# Patient Record
Sex: FEMALE | Race: WHITE | NOT HISPANIC OR LATINO | Employment: OTHER | URBAN - METROPOLITAN AREA
[De-identification: names, ages, dates, MRNs, and addresses within clinical notes are randomized per-mention and may not be internally consistent; named-entity substitution may affect disease eponyms.]

---

## 2017-04-07 ENCOUNTER — HOSPITAL ENCOUNTER (OUTPATIENT)
Facility: MEDICAL CENTER | Age: 32
End: 2017-04-07
Attending: OBSTETRICS & GYNECOLOGY | Admitting: OBSTETRICS & GYNECOLOGY
Payer: COMMERCIAL

## 2017-04-07 VITALS
RESPIRATION RATE: 16 BRPM | OXYGEN SATURATION: 98 % | BODY MASS INDEX: 25.66 KG/M2 | HEART RATE: 55 BPM | HEIGHT: 63 IN | WEIGHT: 144.84 LBS | SYSTOLIC BLOOD PRESSURE: 108 MMHG | TEMPERATURE: 97.8 F | DIASTOLIC BLOOD PRESSURE: 63 MMHG

## 2017-04-07 PROBLEM — O02.1 MISSED ABORTION: Status: ACTIVE | Noted: 2017-04-07

## 2017-04-07 LAB
ABO GROUP BLD: NORMAL
ABO GROUP BLD: NORMAL
BASOPHILS # BLD AUTO: 0.5 % (ref 0–1.8)
BASOPHILS # BLD: 0.03 K/UL (ref 0–0.12)
BLD GP AB SCN SERPL QL: NORMAL
EOSINOPHIL # BLD AUTO: 0.11 K/UL (ref 0–0.51)
EOSINOPHIL NFR BLD: 1.7 % (ref 0–6.9)
ERYTHROCYTE [DISTWIDTH] IN BLOOD BY AUTOMATED COUNT: 40.2 FL (ref 35.9–50)
HCT VFR BLD AUTO: 40.2 % (ref 37–47)
HGB BLD-MCNC: 13.5 G/DL (ref 12–16)
IMM GRANULOCYTES # BLD AUTO: 0.02 K/UL (ref 0–0.11)
IMM GRANULOCYTES NFR BLD AUTO: 0.3 % (ref 0–0.9)
LYMPHOCYTES # BLD AUTO: 1.67 K/UL (ref 1–4.8)
LYMPHOCYTES NFR BLD: 25.1 % (ref 22–41)
MCH RBC QN AUTO: 31.2 PG (ref 27–33)
MCHC RBC AUTO-ENTMCNC: 33.6 G/DL (ref 33.6–35)
MCV RBC AUTO: 92.8 FL (ref 81.4–97.8)
MONOCYTES # BLD AUTO: 0.45 K/UL (ref 0–0.85)
MONOCYTES NFR BLD AUTO: 6.8 % (ref 0–13.4)
NEUTROPHILS # BLD AUTO: 4.37 K/UL (ref 2–7.15)
NEUTROPHILS NFR BLD: 65.6 % (ref 44–72)
NRBC # BLD AUTO: 0 K/UL
NRBC BLD AUTO-RTO: 0 /100 WBC
PLATELET # BLD AUTO: 237 K/UL (ref 164–446)
PMV BLD AUTO: 9.1 FL (ref 9–12.9)
RBC # BLD AUTO: 4.33 M/UL (ref 4.2–5.4)
RH BLD: NORMAL
WBC # BLD AUTO: 6.7 K/UL (ref 4.8–10.8)

## 2017-04-07 PROCEDURE — 86900 BLOOD TYPING SEROLOGIC ABO: CPT

## 2017-04-07 PROCEDURE — 86901 BLOOD TYPING SEROLOGIC RH(D): CPT

## 2017-04-07 PROCEDURE — 160002 HCHG RECOVERY MINUTES (STAT): Performed by: OBSTETRICS & GYNECOLOGY

## 2017-04-07 PROCEDURE — 500342 HCHG CURRETTE TIP, VAC CURV: Performed by: OBSTETRICS & GYNECOLOGY

## 2017-04-07 PROCEDURE — 700111 HCHG RX REV CODE 636 W/ 250 OVERRIDE (IP)

## 2017-04-07 PROCEDURE — 110371 HCHG SHELL REV 272: Performed by: OBSTETRICS & GYNECOLOGY

## 2017-04-07 PROCEDURE — 110382 HCHG SHELL REV 271: Performed by: OBSTETRICS & GYNECOLOGY

## 2017-04-07 PROCEDURE — 160036 HCHG PACU - EA ADDL 30 MINS PHASE I: Performed by: OBSTETRICS & GYNECOLOGY

## 2017-04-07 PROCEDURE — 502240 HCHG MISC OR SUPPLY RC 0272: Performed by: OBSTETRICS & GYNECOLOGY

## 2017-04-07 PROCEDURE — 88305 TISSUE EXAM BY PATHOLOGIST: CPT

## 2017-04-07 PROCEDURE — 36415 COLL VENOUS BLD VENIPUNCTURE: CPT

## 2017-04-07 PROCEDURE — 85025 COMPLETE CBC W/AUTO DIFF WBC: CPT

## 2017-04-07 PROCEDURE — 160048 HCHG OR STATISTICAL LEVEL 1-5: Performed by: OBSTETRICS & GYNECOLOGY

## 2017-04-07 PROCEDURE — 502587 HCHG PACK, D&C: Performed by: OBSTETRICS & GYNECOLOGY

## 2017-04-07 PROCEDURE — 160009 HCHG ANES TIME/MIN: Performed by: OBSTETRICS & GYNECOLOGY

## 2017-04-07 PROCEDURE — A9270 NON-COVERED ITEM OR SERVICE: HCPCS

## 2017-04-07 PROCEDURE — 700101 HCHG RX REV CODE 250

## 2017-04-07 PROCEDURE — A4606 OXYGEN PROBE USED W OXIMETER: HCPCS | Performed by: OBSTETRICS & GYNECOLOGY

## 2017-04-07 PROCEDURE — 160028 HCHG SURGERY MINUTES - 1ST 30 MINS LEVEL 3: Performed by: OBSTETRICS & GYNECOLOGY

## 2017-04-07 PROCEDURE — 700102 HCHG RX REV CODE 250 W/ 637 OVERRIDE(OP)

## 2017-04-07 PROCEDURE — 86850 RBC ANTIBODY SCREEN: CPT

## 2017-04-07 PROCEDURE — 160035 HCHG PACU - 1ST 60 MINS PHASE I: Performed by: OBSTETRICS & GYNECOLOGY

## 2017-04-07 PROCEDURE — 500448 HCHG DRESSING, TELFA 3X4: Performed by: OBSTETRICS & GYNECOLOGY

## 2017-04-07 RX ORDER — SIMETHICONE 80 MG
80 TABLET,CHEWABLE ORAL EVERY 8 HOURS PRN
Status: DISCONTINUED | OUTPATIENT
Start: 2017-04-07 | End: 2017-04-07 | Stop reason: HOSPADM

## 2017-04-07 RX ORDER — ACETAMINOPHEN 325 MG/1
650 TABLET ORAL EVERY 6 HOURS
Status: DISCONTINUED | OUTPATIENT
Start: 2017-04-07 | End: 2017-04-07 | Stop reason: HOSPADM

## 2017-04-07 RX ORDER — OXYCODONE HYDROCHLORIDE 5 MG/1
10 TABLET ORAL
Status: DISCONTINUED | OUTPATIENT
Start: 2017-04-07 | End: 2017-04-07 | Stop reason: HOSPADM

## 2017-04-07 RX ORDER — OXYCODONE HYDROCHLORIDE 5 MG/1
5 TABLET ORAL
Status: DISCONTINUED | OUTPATIENT
Start: 2017-04-07 | End: 2017-04-07 | Stop reason: HOSPADM

## 2017-04-07 RX ORDER — ONDANSETRON 2 MG/ML
4 INJECTION INTRAMUSCULAR; INTRAVENOUS EVERY 6 HOURS PRN
Status: DISCONTINUED | OUTPATIENT
Start: 2017-04-07 | End: 2017-04-07 | Stop reason: HOSPADM

## 2017-04-07 RX ORDER — SODIUM CHLORIDE, SODIUM LACTATE, POTASSIUM CHLORIDE, CALCIUM CHLORIDE 600; 310; 30; 20 MG/100ML; MG/100ML; MG/100ML; MG/100ML
1000 INJECTION, SOLUTION INTRAVENOUS
Status: COMPLETED | OUTPATIENT
Start: 2017-04-07 | End: 2017-04-07

## 2017-04-07 RX ADMIN — SODIUM CHLORIDE, SODIUM LACTATE, POTASSIUM CHLORIDE, CALCIUM CHLORIDE 1000 ML: 600; 310; 30; 20 INJECTION, SOLUTION INTRAVENOUS at 12:05

## 2017-04-07 RX ADMIN — HYDROCODONE BITARTRATE AND ACETAMINOPHEN 15 ML: 2.5; 108 SOLUTION ORAL at 13:40

## 2017-04-07 ASSESSMENT — PAIN SCALES - GENERAL
PAINLEVEL_OUTOF10: 2
PAINLEVEL_OUTOF10: 2
PAINLEVEL_OUTOF10: 0
PAINLEVEL_OUTOF10: 1
PAINLEVEL_OUTOF10: 2

## 2017-04-07 NOTE — H&P
PLANNED DATE OF PROCEDURE:  2017    CHIEF COMPLAINT:  Missed .    HISTORY OF PRESENT ILLNESS:  This is a 32-year-old  female -0-2-0 with a missed  at 6 weeks by ultrasound.  She had an   ultrasound few days ago showing a 6-week intrauterine fetal pole without   cardiac activity.  She had blood work done with falling hCG quant.  A repeat   ultrasound on 2017 again found a small fetal pole without cardiac   activity.  The yolk sac was slightly enlarged, but regular.  The patient is   also having a small amount of bleeding.  She had no other complaints.  She is   Rh positive.    PAST MEDICAL HISTORY:  Significant for cervical dysplasia.    PAST SURGICAL HISTORY:  Breast augmentation and LEEP.    FAMILY HISTORY:  None.    ALLERGIES:  PERCOCET.    SOCIAL HISTORY:  Denies tobacco, alcohol or drug use.    MEDICATIONS:  Prenatal vitamins.    REVIEW OF SYSTEMS:  As in the HPI, otherwise negative for greater than 10   systems.  Patient did not have any chest pain nor heart palpitations.  No   signs or symptoms of DVT.    PHYSICAL EXAMINATION:  VITAL SIGNS:  Blood pressure 110/62, respirations 12, weight 146 pounds,   height 5 feet 4 inches.  GENERAL:  No apparent distress.  HEENT:  Normal.  LUNGS:  Clear to auscultation bilaterally.  CARDIOVASCULAR:  Regular rate and rhythm.  ABDOMEN:  Soft, nontender, and nondistended.  No masses, no guarding, no   rebound.  No peritoneal signs.  GENITOURINARY:  External genitalia is normal, no lesions.  Vagina is normal,   no lesions.  Cervix is without lesions.  There is a small amount of blood   within the vagina.  Uterus is nontender.  Adnexa, no masses.  EXTREMITIES:  No signs or symptoms of DVT.    ASSESSMENT:  Missed .    PLAN:  Counseled the patient on risks, benefits, alternatives of different   therapies.  She strongly wants dilation and evacuation.  She no longer wants   expectant management.  She was counseled on the risks of  surgery, risks   include but not limited to bleeding, infection, organ damage (such as bowel,   bladder, ureter, nerves, vessels, uterus, etc.), need for further corrective   procedures, anesthesia risks, cardiovascular risk, uterine perforation, need   for blood transfusion, scar tissue such as Ascherman's formation, disability, death,   etc.  Patient states she accepts these risks and desires to proceed with the   procedure, planning of dilation and evacuation and any necessary procedures.       ____________________________________     MD FARIBA ESTRADA / NTS    DD:  04/06/2017 17:34:48  DT:  04/06/2017 20:20:30    D#:  [id number]  Job#:  [id number]

## 2017-04-07 NOTE — OR NURSING
1330 Report from Kateryna RN, Pt c/o of mild pain and requested oral pain medication.  Abdomen soft, lisa pad CDI.  Pt boyfriend at bedside.      1340 Oral pain medication given see mar.  Pt drinking sips of sprite.      1408 Pt voided without difficulty.  Steady gait, no c/o of n/v.  States tolerable pain.  Minimal bleeding noted.     1410 Dc instructions completed with Pt and her boyfriend.       1418 Dc to car via ambulating.  Pt has all belongings.

## 2017-04-07 NOTE — OR SURGEON
Immediate Post-Operative Note      PreOp Diagnosis: Missed Ab    PostOp Diagnosis: Same    Procedure(s):  DILATION AND EVACUATION - Wound Class: Clean Contaminated    Surgeon(s):  Kannan Castillo M.D.    Anesthesiologist/Type of Anesthesia:  Anesthesiologist: Rosetta Rai M.D./General    Surgical Staff:  Circulator: Marily Valdivia R.N.  Relief Circulator: Chasity Malone R.N.  Scrub Person: Yaquelin Hennessy    Specimen: POC    Estimated Blood Loss: 30cc    Findings: 9cm ut, POC    Complications: None        4/7/2017 1:08 PM Kannan Castillo

## 2017-04-07 NOTE — OR NURSING
1313 Received pt from OR, received report from Dr. Rai. Pt waking up, LMA dc'd upon arrival to PACU.     1315 Pt.'s SO called to bedside.    1330 Report to Emily JACKSON.

## 2017-04-07 NOTE — IP AVS SNAPSHOT
4/7/2017          Tamiko Gonzalez  [address] [place] [address]    Dear Tamiko:    On license of [facility] wants to ensure your discharge home is safe and you or your loved ones have had all your questions answered regarding your care after you leave the hospital.    You may receive a telephone call within two days of your discharge.  This call is to make certain you understand your discharge instructions as well as ensure we provided you with the best care possible during your stay with us.     The call will only last approximately 3-5 minutes and will be done by a nurse.    Once again, we want to ensure your discharge home is safe and that you have a clear understanding of any next steps in your care.  If you have any questions or concerns, please do not hesitate to contact us, we are here for you.  Thank you for choosing [facility] for your healthcare needs.    Sincerely,    Cirilo Bautista    [facility]

## 2017-04-07 NOTE — OP REPORT
DATE OF SERVICE:  2017    PREOPERATIVE DIAGNOSIS:  Missed .    POSTOPERATIVE DIAGNOSIS:  Missed .    PROCEDURE PERFORMED:  Dilation and evacuation.    SURGEON:  Kannan Castillo MD    ASSISTANT:  None.    ANESTHESIOLOGIST:  Rosetta Rai MD    ANESTHESIA:  General.    SPECIMENS:  Products of conception.    ESTIMATED BLOOD LOSS:  30 mL.    FINDINGS:  A 9 cm uterus, products of conception.    INDICATIONS:  Please see history and physical for indications.  The patient   accepted the risks of surgery to self.  She also declined expected management.    DESCRIPTION OF PROCEDURE:  The patient was taken to operating room where   general anesthesia was found to be adequate.  She was then prepared and draped   in normal sterile fashion in the dorsal lithotomy position with legs in Tommy   stirrups.  Her bladder was in and out catheterized.  Cervix was visualized   and grasped with a tenaculum.  It was dilated to a level of 8 mm.  A 7 mm   curved curette was then chosen, maximum suction was 50 mmHg suction.  The   curette was then gently advanced to the fundus of the uterus under no suction.    Suction was then engaged.  Curette was turned in clockwise manner and slowly   withdrawn from the uterus.  Products of conception were seen going through   the tube.  This was done several times until no products of conception were   seen.  A blunt loop curette was then chosen and a gentle curettage of the   uterus was performed and there was a good uterine cry.  The suction curet was   then passed a few more times to ensure evacuation of contents from the uterus.    Instruments were removed from the vagina.  Bleeding from the cervical   tenaculum site was made hemostatic with silver nitrate sticks.    The patient tolerated the procedure well.  Circulating staff announced the   counts were correct.  The patient was taken to recovery room in stable   condition.    COMPLICATIONS:  None.    COUNTS:  Correct.        ____________________________________     MD FARIBA ESTRADA    DD:  04/07/2017 13:12:01  DT:  04/07/2017 13:29:45    D#:  [id number]  Job#:  [id number]    cc: OB [facility]

## 2017-04-07 NOTE — IP AVS SNAPSHOT
" Home Care Instructions                                                                                                                Name:Tamiko Gonzalez  Medical Record Number:[id number]  CSN: [id number]    YOB: 1985   Age: 32 y.o.  Sex: female  HT:1.6 m (5' 2.99\") WT: 65.7 kg (144 lb 13.5 oz)          Admit Date: 4/7/2017     Discharge Date:   Today's Date: 4/7/2017  Attending Doctor:  Kannan Castillo M.D.                  Allergies:  Review of patient's allergies indicates no known allergies.                Discharge Instructions         ACTIVITY: Rest and take it easy for the first 24 hours.  A responsible adult is recommended to remain with you during that time.  It is normal to feel sleepy.  We encourage you to not do anything that requires balance, judgment or coordination.    MILD FLU-LIKE SYMPTOMS ARE NORMAL. YOU MAY EXPERIENCE GENERALIZED MUSCLE ACHES, THROAT IRRITATION, HEADACHE AND/OR SOME NAUSEA.    FOR 24 HOURS DO NOT:  Drive, operate machinery or run household appliances.  Drink beer or alcoholic beverages.   Make important decisions or sign legal documents.    SPECIAL INSTRUCTIONS: See attached instruction sheet  Follow up immediately office or if closed emergency room with fever >100 degrees, severe pain, intractable nausea or vomiting, syncope or dizziness, vaginal bleeding greater than a period, any concerns. Pelvic rest for 6 weeks, no driving on narcotic meds. Follow up appointment= 1-2 weeks.     DIET: To avoid nausea, slowly advance diet as tolerated, avoiding spicy or greasy foods for the first day.  Add more substantial food to your diet according to your physician's instructions.  Babies can be fed formula or breast milk as soon as they are hungry.  INCREASE FLUIDS AND FIBER TO AVOID CONSTIPATION.    SURGICAL DRESSING/BATHING: May shower tomorrow,  hot tubs baths or swimming until approved by your physician     FOLLOW-UP APPOINTMENT:  A follow-up appointment should be arranged " with your doctor, call to schedule.   Follow up appointment= 1-2 weeks.       You should CALL YOUR PHYSICIAN if you develop:  Fever greater than 101 degrees F.  Pain not relieved by medication, or persistent nausea or vomiting.  Excessive bleeding (blood soaking through dressing) or unexpected drainage from the wound.  Extreme redness or swelling around the incision site, drainage of pus or foul smelling drainage.  Inability to urinate or empty your bladder within 8 hours.  Problems with breathing or chest pain.    You should call 911 if you develop problems with breathing or chest pain.  If you are unable to contact your doctor or surgical center, you should go to the nearest emergency room or urgent care center.  Physician's telephone #: Dr. Castillo [phone]    If any questions arise, call your doctor.  If your doctor is not available, please feel free to call the Surgical Center at [phone].  The Center is open Monday through Friday from 7AM to 7PM.  You can also call the [employer] HOTLINE open 24 hours/day, 7 days/week and speak to a nurse at [phone], or toll free at [phone].    A registered nurse may call you a few days after your surgery to see how you are doing after your procedure.    MEDICATIONS: Resume taking daily medication.  Take prescribed pain medication with food.  If no medication is prescribed, you may take non-aspirin pain medication if needed.  PAIN MEDICATION CAN BE VERY CONSTIPATING.  Take a stool softener or laxative such as senokot, pericolace, or milk of magnesia if needed.    Prescription given for Has at home.  Last pain medication given at 1:40 Pm, next dose due at 5:40 Pm    If your physician has prescribed pain medication that includes Acetaminophen (Tylenol), do not take additional Acetaminophen (Tylenol) while taking the prescribed medication.    Depression / Suicide Risk    As you are discharged from this [facility] facility, it is important to learn how to keep safe  from harming yourself.    Recognize the warning signs:  · Abrupt changes in personality, positive or negative- including increase in energy   · Giving away possessions  · Change in eating patterns- significant weight changes-  positive or negative  · Change in sleeping patterns- unable to sleep or sleeping all the time   · Unwillingness or inability to communicate  · Depression  · Unusual sadness, discouragement and loneliness  · Talk of wanting to die  · Neglect of personal appearance   · Rebelliousness- reckless behavior  · Withdrawal from people/activities they love  · Confusion- inability to concentrate     If you or a loved one observes any of these behaviors or has concerns about self-harm, here's what you can do:  · Talk about it- your feelings and reasons for harming yourself  · Remove any means that you might use to hurt yourself (examples: pills, rope, extension cords, firearm)  · Get professional help from the community (Mental Health, Substance Abuse, psychological counseling)  · Do not be alone:Call your Safe Contact- someone whom you trust who will be there for you.  · Call your local CRISIS HOTLINE 812-1514 or 238-287-9114  · Call your local Children's Mobile Crisis Response Team [place] (032) 414-5507 or www.Adlyfe  · Call the toll free National Suicide Prevention Hotlines   · National Suicide Prevention Lifeline 735-172-LKBL (0597)  · National Hope Line Network 800-SUICIDE (346-6867)       Medication List      Notice     You have not been prescribed any medications.            Medication Information     Above and/or attached are the medications your physician expects you to take upon discharge. Review all of your home medications and newly ordered medications with your doctor and/or pharmacist. Follow medication instructions as directed by your doctor and/or pharmacist. Please keep your medication list with you and share with your physician. Update the information when medications  are discontinued, doses are changed, or new medications (including over-the-counter products) are added; and carry medication information at all times in the event of emergency situations.        Resources     Quit Smoking / Tobacco Use:    I understand the use of any tobacco products increases my chance of suffering from future heart disease or stroke and could cause other illnesses which may shorten my life. Quitting the use of tobacco products is the single most important thing I can do to improve my health. For further information on smoking / tobacco cessation call a Toll Free Quit Line at 1-732.558.5749 (*National Cancer [place]) or 1-636.832.1890 (American Lung Association) or you can access the web based program at www.lungusa.org.    [place] Tobacco Users Help Line:  (850) 111-7900       Toll Free: 1-535.136.5004  Quit Tobacco Program [facility] (912)723-3576    Crisis Hotline:    [place] Crisis Hotline:  5-280-TBYMZPU or 1-813.733.5776    [place] Crisis Hotline:    1-875.905.7955 or 162-155-4909    Discharge Survey:   Thank you for choosing [facility]. We hope we did everything we could to make your hospital stay a pleasant one. You may be receiving a survey and we would appreciate your time and participation in answering the questions. Your input is very valuable to us in our efforts to improve our service to our patients and their families.            Signatures     My signature on this form indicates that:    1. I acknowledge receipt and understanding of these Home Care Instruction.  2. My questions regarding this information have been answered to my satisfaction.  3. I have formulated a plan with my discharge nurse to obtain my prescribed medications for home.    __________________________________      __________________________________                   Patient Signature                                 Guardian/Responsible Adult  Signature      __________________________________                 __________       ________                       Nurse Signature                                               Date                 Time

## 2017-04-07 NOTE — DISCHARGE INSTRUCTIONS
ACTIVITY: Rest and take it easy for the first 24 hours.  A responsible adult is recommended to remain with you during that time.  It is normal to feel sleepy.  We encourage you to not do anything that requires balance, judgment or coordination.    MILD FLU-LIKE SYMPTOMS ARE NORMAL. YOU MAY EXPERIENCE GENERALIZED MUSCLE ACHES, THROAT IRRITATION, HEADACHE AND/OR SOME NAUSEA.    FOR 24 HOURS DO NOT:  Drive, operate machinery or run household appliances.  Drink beer or alcoholic beverages.   Make important decisions or sign legal documents.    SPECIAL INSTRUCTIONS: See attached instruction sheet  Follow up immediately office or if closed emergency room with fever >100 degrees, severe pain, intractable nausea or vomiting, syncope or dizziness, vaginal bleeding greater than a period, any concerns. Pelvic rest for 6 weeks, no driving on narcotic meds. Follow up appointment= 1-2 weeks.     DIET: To avoid nausea, slowly advance diet as tolerated, avoiding spicy or greasy foods for the first day.  Add more substantial food to your diet according to your physician's instructions.  Babies can be fed formula or breast milk as soon as they are hungry.  INCREASE FLUIDS AND FIBER TO AVOID CONSTIPATION.    SURGICAL DRESSING/BATHING: May shower tomorrow,  hot tubs baths or swimming until approved by your physician     FOLLOW-UP APPOINTMENT:  A follow-up appointment should be arranged with your doctor, call to schedule.   Follow up appointment= 1-2 weeks.       You should CALL YOUR PHYSICIAN if you develop:  Fever greater than 101 degrees F.  Pain not relieved by medication, or persistent nausea or vomiting.  Excessive bleeding (blood soaking through dressing) or unexpected drainage from the wound.  Extreme redness or swelling around the incision site, drainage of pus or foul smelling drainage.  Inability to urinate or empty your bladder within 8 hours.  Problems with breathing or chest pain.    You should call 911 if you develop  problems with breathing or chest pain.  If you are unable to contact your doctor or surgical center, you should go to the nearest emergency room or urgent care center.  Physician's telephone #: Dr. Castillo [phone]    If any questions arise, call your doctor.  If your doctor is not available, please feel free to call the Surgical Center at [phone].  The Center is open Monday through Friday from 7AM to 7PM.  You can also call the HEALTH HOTLINE open 24 hours/day, 7 days/week and speak to a nurse at [phone], or toll free at [phone].    A registered nurse may call you a few days after your surgery to see how you are doing after your procedure.    MEDICATIONS: Resume taking daily medication.  Take prescribed pain medication with food.  If no medication is prescribed, you may take non-aspirin pain medication if needed.  PAIN MEDICATION CAN BE VERY CONSTIPATING.  Take a stool softener or laxative such as senokot, pericolace, or milk of magnesia if needed.    Prescription given for Has at home.  Last pain medication given at 1:40 Pm, next dose due at 5:40 Pm    If your physician has prescribed pain medication that includes Acetaminophen (Tylenol), do not take additional Acetaminophen (Tylenol) while taking the prescribed medication.    Depression / Suicide Risk    As you are discharged from this [facility] facility, it is important to learn how to keep safe from harming yourself.    Recognize the warning signs:  · Abrupt changes in personality, positive or negative- including increase in energy   · Giving away possessions  · Change in eating patterns- significant weight changes-  positive or negative  · Change in sleeping patterns- unable to sleep or sleeping all the time   · Unwillingness or inability to communicate  · Depression  · Unusual sadness, discouragement and loneliness  · Talk of wanting to die  · Neglect of personal appearance   · Rebelliousness- reckless behavior  · Withdrawal from  people/activities they love  · Confusion- inability to concentrate     If you or a loved one observes any of these behaviors or has concerns about self-harm, here's what you can do:  · Talk about it- your feelings and reasons for harming yourself  · Remove any means that you might use to hurt yourself (examples: pills, rope, extension cords, firearm)  · Get professional help from the community (Mental Health, Substance Abuse, psychological counseling)  · Do not be alone:Call your Safe Contact- someone whom you trust who will be there for you.  · Call your local CRISIS HOTLINE 135-2379 or 734-381-9051  · Call your local Children's Mobile Crisis Response Team [place] (582) 476-4428 or www.CardSpring  · Call the toll free National Suicide Prevention Hotlines   · National Suicide Prevention Lifeline 245-491-BGZK (2954)  · National Hope Line Network 800-SUICIDE (572-2320)

## 2017-12-01 ENCOUNTER — HOSPITAL ENCOUNTER (OUTPATIENT)
Facility: MEDICAL CENTER | Age: 32
End: 2017-12-01
Attending: OBSTETRICS & GYNECOLOGY | Admitting: OBSTETRICS & GYNECOLOGY
Payer: COMMERCIAL

## 2017-12-01 VITALS
BODY MASS INDEX: 29.23 KG/M2 | WEIGHT: 165 LBS | SYSTOLIC BLOOD PRESSURE: 126 MMHG | TEMPERATURE: 98.6 F | HEART RATE: 76 BPM | HEIGHT: 63 IN | DIASTOLIC BLOOD PRESSURE: 71 MMHG

## 2017-12-01 LAB
APPEARANCE UR: CLEAR
COLOR UR AUTO: YELLOW
GLUCOSE UR QL STRIP.AUTO: NEGATIVE MG/DL
KETONES UR QL STRIP.AUTO: NEGATIVE MG/DL
LEUKOCYTE ESTERASE UR QL STRIP.AUTO: NEGATIVE
NITRITE UR QL STRIP.AUTO: NEGATIVE
PH UR STRIP.AUTO: 7 [PH]
PROT UR QL STRIP: NEGATIVE MG/DL
RBC UR QL AUTO: NEGATIVE
SP GR UR: 1.01

## 2017-12-01 PROCEDURE — 81002 URINALYSIS NONAUTO W/O SCOPE: CPT

## 2017-12-01 NOTE — PROGRESS NOTES
EDC- 3/3/18, EGA- 26.6    1345- Pt arrived to L&D with c/o decreased FM since last night.  Pt states she had her glucose tolerance test yesterday and initially felt increased movement during the day then significantly decreased movement last night and today.  Denies UC's, LOF, or VB.  EFM/TOCO applied.  Initial BP elevated, subsequent BP's normalized, POC urine dip preformed.    1415- Pt reports feeling normal FM at this time.  Report to  Working via phone, orders to discharge pt home.  Discussed  labor precautions, kick counts, and follow up with pt.  Pt states she has an appointment with Dr. Castillo on , feels comfortable going home at this time.  1425- Pt discharged home ambulatory in stable condition with her mother at side.

## 2018-01-11 ENCOUNTER — HOSPITAL ENCOUNTER (OUTPATIENT)
Facility: MEDICAL CENTER | Age: 33
End: 2018-01-11
Attending: OBSTETRICS & GYNECOLOGY
Payer: COMMERCIAL

## 2018-01-11 PROCEDURE — 80053 COMPREHEN METABOLIC PANEL: CPT

## 2018-01-11 PROCEDURE — 85025 COMPLETE CBC W/AUTO DIFF WBC: CPT

## 2018-01-11 PROCEDURE — 84550 ASSAY OF BLOOD/URIC ACID: CPT

## 2018-01-12 LAB
ALBUMIN SERPL BCP-MCNC: 3.5 G/DL (ref 3.2–4.9)
ALBUMIN/GLOB SERPL: 1.3 G/DL
ALP SERPL-CCNC: 60 U/L (ref 30–99)
ALT SERPL-CCNC: 20 U/L (ref 2–50)
ANION GAP SERPL CALC-SCNC: 9 MMOL/L (ref 0–11.9)
AST SERPL-CCNC: 23 U/L (ref 12–45)
BASOPHILS # BLD AUTO: 0.4 % (ref 0–1.8)
BASOPHILS # BLD: 0.04 K/UL (ref 0–0.12)
BILIRUB SERPL-MCNC: 0.3 MG/DL (ref 0.1–1.5)
BUN SERPL-MCNC: 5 MG/DL (ref 8–22)
CALCIUM SERPL-MCNC: 8.6 MG/DL (ref 8.5–10.5)
CHLORIDE SERPL-SCNC: 108 MMOL/L (ref 96–112)
CO2 SERPL-SCNC: 18 MMOL/L (ref 20–33)
CREAT SERPL-MCNC: 0.53 MG/DL (ref 0.5–1.4)
EOSINOPHIL # BLD AUTO: 0.09 K/UL (ref 0–0.51)
EOSINOPHIL NFR BLD: 0.9 % (ref 0–6.9)
ERYTHROCYTE [DISTWIDTH] IN BLOOD BY AUTOMATED COUNT: 48.1 FL (ref 35.9–50)
GLOBULIN SER CALC-MCNC: 2.7 G/DL (ref 1.9–3.5)
GLUCOSE SERPL-MCNC: 76 MG/DL (ref 65–99)
HCT VFR BLD AUTO: 41.9 % (ref 37–47)
HGB BLD-MCNC: 13.7 G/DL (ref 12–16)
IMM GRANULOCYTES # BLD AUTO: 0.05 K/UL (ref 0–0.11)
IMM GRANULOCYTES NFR BLD AUTO: 0.5 % (ref 0–0.9)
LYMPHOCYTES # BLD AUTO: 1.36 K/UL (ref 1–4.8)
LYMPHOCYTES NFR BLD: 14.2 % (ref 22–41)
MCH RBC QN AUTO: 30.9 PG (ref 27–33)
MCHC RBC AUTO-ENTMCNC: 32.7 G/DL (ref 33.6–35)
MCV RBC AUTO: 94.6 FL (ref 81.4–97.8)
MONOCYTES # BLD AUTO: 0.74 K/UL (ref 0–0.85)
MONOCYTES NFR BLD AUTO: 7.7 % (ref 0–13.4)
NEUTROPHILS # BLD AUTO: 7.31 K/UL (ref 2–7.15)
NEUTROPHILS NFR BLD: 76.3 % (ref 44–72)
NRBC # BLD AUTO: 0 K/UL
NRBC BLD-RTO: 0 /100 WBC
PLATELET # BLD AUTO: 242 K/UL (ref 164–446)
PMV BLD AUTO: 9.8 FL (ref 9–12.9)
POTASSIUM SERPL-SCNC: 3.7 MMOL/L (ref 3.6–5.5)
PROT SERPL-MCNC: 6.2 G/DL (ref 6–8.2)
RBC # BLD AUTO: 4.43 M/UL (ref 4.2–5.4)
SODIUM SERPL-SCNC: 135 MMOL/L (ref 135–145)
URATE SERPL-MCNC: 3.9 MG/DL (ref 1.9–8.2)
WBC # BLD AUTO: 9.6 K/UL (ref 4.8–10.8)

## 2018-01-26 ENCOUNTER — HOSPITAL ENCOUNTER (OUTPATIENT)
Dept: LAB | Facility: MEDICAL CENTER | Age: 33
End: 2018-01-26
Attending: OBSTETRICS & GYNECOLOGY
Payer: COMMERCIAL

## 2018-01-26 LAB
ALBUMIN SERPL BCP-MCNC: 3.4 G/DL (ref 3.2–4.9)
ALBUMIN/GLOB SERPL: 1.1 G/DL
ALP SERPL-CCNC: 84 U/L (ref 30–99)
ALT SERPL-CCNC: 23 U/L (ref 2–50)
ANION GAP SERPL CALC-SCNC: 10 MMOL/L (ref 0–11.9)
AST SERPL-CCNC: 24 U/L (ref 12–45)
BASOPHILS # BLD AUTO: 0.2 % (ref 0–1.8)
BASOPHILS # BLD: 0.02 K/UL (ref 0–0.12)
BILIRUB SERPL-MCNC: 0.3 MG/DL (ref 0.1–1.5)
BUN SERPL-MCNC: 4 MG/DL (ref 8–22)
CALCIUM SERPL-MCNC: 9.7 MG/DL (ref 8.5–10.5)
CHLORIDE SERPL-SCNC: 107 MMOL/L (ref 96–112)
CO2 SERPL-SCNC: 19 MMOL/L (ref 20–33)
CREAT SERPL-MCNC: 0.49 MG/DL (ref 0.5–1.4)
EOSINOPHIL # BLD AUTO: 0.05 K/UL (ref 0–0.51)
EOSINOPHIL NFR BLD: 0.6 % (ref 0–6.9)
ERYTHROCYTE [DISTWIDTH] IN BLOOD BY AUTOMATED COUNT: 45.7 FL (ref 35.9–50)
GLOBULIN SER CALC-MCNC: 3.2 G/DL (ref 1.9–3.5)
GLUCOSE SERPL-MCNC: 71 MG/DL (ref 65–99)
HCT VFR BLD AUTO: 42.6 % (ref 37–47)
HGB BLD-MCNC: 14.6 G/DL (ref 12–16)
IMM GRANULOCYTES # BLD AUTO: 0.03 K/UL (ref 0–0.11)
IMM GRANULOCYTES NFR BLD AUTO: 0.3 % (ref 0–0.9)
LYMPHOCYTES # BLD AUTO: 1.18 K/UL (ref 1–4.8)
LYMPHOCYTES NFR BLD: 13.3 % (ref 22–41)
MCH RBC QN AUTO: 31.8 PG (ref 27–33)
MCHC RBC AUTO-ENTMCNC: 34.3 G/DL (ref 33.6–35)
MCV RBC AUTO: 92.8 FL (ref 81.4–97.8)
MONOCYTES # BLD AUTO: 0.68 K/UL (ref 0–0.85)
MONOCYTES NFR BLD AUTO: 7.7 % (ref 0–13.4)
NEUTROPHILS # BLD AUTO: 6.88 K/UL (ref 2–7.15)
NEUTROPHILS NFR BLD: 77.9 % (ref 44–72)
NRBC # BLD AUTO: 0 K/UL
NRBC BLD-RTO: 0 /100 WBC
PLATELET # BLD AUTO: 220 K/UL (ref 164–446)
PMV BLD AUTO: 9.9 FL (ref 9–12.9)
POTASSIUM SERPL-SCNC: 3.7 MMOL/L (ref 3.6–5.5)
PROT SERPL-MCNC: 6.6 G/DL (ref 6–8.2)
RBC # BLD AUTO: 4.59 M/UL (ref 4.2–5.4)
SODIUM SERPL-SCNC: 136 MMOL/L (ref 135–145)
URATE SERPL-MCNC: 4.2 MG/DL (ref 1.9–8.2)
WBC # BLD AUTO: 8.8 K/UL (ref 4.8–10.8)

## 2018-01-26 PROCEDURE — 85025 COMPLETE CBC W/AUTO DIFF WBC: CPT

## 2018-01-26 PROCEDURE — 80053 COMPREHEN METABOLIC PANEL: CPT

## 2018-01-26 PROCEDURE — 84550 ASSAY OF BLOOD/URIC ACID: CPT

## 2018-02-01 ENCOUNTER — HOSPITAL ENCOUNTER (OUTPATIENT)
Facility: MEDICAL CENTER | Age: 33
End: 2018-02-01
Attending: OBSTETRICS & GYNECOLOGY | Admitting: OBSTETRICS & GYNECOLOGY
Payer: COMMERCIAL

## 2018-02-01 ENCOUNTER — HOSPITAL ENCOUNTER (OUTPATIENT)
Dept: LAB | Facility: MEDICAL CENTER | Age: 33
End: 2018-02-01
Attending: OBSTETRICS & GYNECOLOGY
Payer: COMMERCIAL

## 2018-02-01 VITALS
TEMPERATURE: 97.6 F | DIASTOLIC BLOOD PRESSURE: 63 MMHG | WEIGHT: 176 LBS | BODY MASS INDEX: 31.18 KG/M2 | HEART RATE: 80 BPM | SYSTOLIC BLOOD PRESSURE: 109 MMHG | HEIGHT: 63 IN

## 2018-02-01 LAB
ALBUMIN SERPL BCP-MCNC: 3.3 G/DL (ref 3.2–4.9)
ALBUMIN/GLOB SERPL: 1.2 G/DL
ALP SERPL-CCNC: 81 U/L (ref 30–99)
ALT SERPL-CCNC: 14 U/L (ref 2–50)
ANION GAP SERPL CALC-SCNC: 9 MMOL/L (ref 0–11.9)
APPEARANCE UR: CLEAR
AST SERPL-CCNC: 19 U/L (ref 12–45)
BASOPHILS # BLD AUTO: 0.2 % (ref 0–1.8)
BASOPHILS # BLD: 0.02 K/UL (ref 0–0.12)
BILIRUB SERPL-MCNC: 0.4 MG/DL (ref 0.1–1.5)
BILIRUB UR QL STRIP.AUTO: NEGATIVE
BUN SERPL-MCNC: 4 MG/DL (ref 8–22)
CALCIUM SERPL-MCNC: 8.6 MG/DL (ref 8.5–10.5)
CHLORIDE SERPL-SCNC: 107 MMOL/L (ref 96–112)
CO2 SERPL-SCNC: 19 MMOL/L (ref 20–33)
COLOR UR: YELLOW
CREAT SERPL-MCNC: 0.47 MG/DL (ref 0.5–1.4)
EOSINOPHIL # BLD AUTO: 0.05 K/UL (ref 0–0.51)
EOSINOPHIL NFR BLD: 0.5 % (ref 0–6.9)
ERYTHROCYTE [DISTWIDTH] IN BLOOD BY AUTOMATED COUNT: 43.5 FL (ref 35.9–50)
GLOBULIN SER CALC-MCNC: 2.8 G/DL (ref 1.9–3.5)
GLUCOSE SERPL-MCNC: 77 MG/DL (ref 65–99)
GLUCOSE UR STRIP.AUTO-MCNC: NEGATIVE MG/DL
HCT VFR BLD AUTO: 40 % (ref 37–47)
HGB BLD-MCNC: 13.9 G/DL (ref 12–16)
IMM GRANULOCYTES # BLD AUTO: 0.03 K/UL (ref 0–0.11)
IMM GRANULOCYTES NFR BLD AUTO: 0.3 % (ref 0–0.9)
KETONES UR STRIP.AUTO-MCNC: ABNORMAL MG/DL
LEUKOCYTE ESTERASE UR QL STRIP.AUTO: ABNORMAL
LYMPHOCYTES # BLD AUTO: 1.23 K/UL (ref 1–4.8)
LYMPHOCYTES NFR BLD: 12.9 % (ref 22–41)
MCH RBC QN AUTO: 32 PG (ref 27–33)
MCHC RBC AUTO-ENTMCNC: 34.8 G/DL (ref 33.6–35)
MCV RBC AUTO: 92 FL (ref 81.4–97.8)
MICRO URNS: ABNORMAL
MONOCYTES # BLD AUTO: 0.86 K/UL (ref 0–0.85)
MONOCYTES NFR BLD AUTO: 9 % (ref 0–13.4)
NEUTROPHILS # BLD AUTO: 7.33 K/UL (ref 2–7.15)
NEUTROPHILS NFR BLD: 77.1 % (ref 44–72)
NITRITE UR QL STRIP.AUTO: NEGATIVE
NRBC # BLD AUTO: 0 K/UL
NRBC BLD-RTO: 0 /100 WBC
PH UR STRIP.AUTO: 7 [PH]
PLATELET # BLD AUTO: 206 K/UL (ref 164–446)
PMV BLD AUTO: 9.8 FL (ref 9–12.9)
POTASSIUM SERPL-SCNC: 3.7 MMOL/L (ref 3.6–5.5)
PROT SERPL-MCNC: 6.1 G/DL (ref 6–8.2)
PROT UR QL STRIP: NEGATIVE MG/DL
RBC # BLD AUTO: 4.35 M/UL (ref 4.2–5.4)
RBC # URNS HPF: NORMAL /HPF
RBC UR QL AUTO: NEGATIVE
SODIUM SERPL-SCNC: 135 MMOL/L (ref 135–145)
SP GR UR STRIP.AUTO: 1
URATE SERPL-MCNC: 4.2 MG/DL (ref 1.9–8.2)
WBC # BLD AUTO: 9.5 K/UL (ref 4.8–10.8)
WBC #/AREA URNS HPF: NORMAL /HPF

## 2018-02-01 PROCEDURE — 80053 COMPREHEN METABOLIC PANEL: CPT

## 2018-02-01 PROCEDURE — 59025 FETAL NON-STRESS TEST: CPT | Performed by: OBSTETRICS & GYNECOLOGY

## 2018-02-01 PROCEDURE — 85025 COMPLETE CBC W/AUTO DIFF WBC: CPT

## 2018-02-01 PROCEDURE — 84550 ASSAY OF BLOOD/URIC ACID: CPT

## 2018-02-01 PROCEDURE — 36415 COLL VENOUS BLD VENIPUNCTURE: CPT

## 2018-02-01 PROCEDURE — 81001 URINALYSIS AUTO W/SCOPE: CPT

## 2018-02-01 PROCEDURE — 700111 HCHG RX REV CODE 636 W/ 250 OVERRIDE (IP): Performed by: OBSTETRICS & GYNECOLOGY

## 2018-02-01 PROCEDURE — 87653 STREP B DNA AMP PROBE: CPT

## 2018-02-01 RX ORDER — BETAMETHASONE SODIUM PHOSPHATE AND BETAMETHASONE ACETATE 3; 3 MG/ML; MG/ML
12 INJECTION, SUSPENSION INTRA-ARTICULAR; INTRALESIONAL; INTRAMUSCULAR; SOFT TISSUE EVERY 24 HOURS
Status: DISCONTINUED | OUTPATIENT
Start: 2018-02-01 | End: 2018-02-01 | Stop reason: HOSPADM

## 2018-02-01 RX ADMIN — BETAMETHASONE SODIUM PHOSPHATE AND BETAMETHASONE ACETATE 12 MG: 3; 3 INJECTION, SUSPENSION INTRA-ARTICULAR; INTRALESIONAL; INTRAMUSCULAR at 13:30

## 2018-02-01 NOTE — PROGRESS NOTES
1110 33 YO  presents to floor after being sent over from Dr. Castillo's office for PIH work-up. BP in office were 150s/90s. Pt reports minor vision changes ('it gets fuzzy sometimes'), a new pressure in head, but no excessive swelling or fatigue. Pt reports positive FM. Pt denies LOF, vaginal bleeding, and contractions. FOB at bedside.    1145 urine results are have trace ketones and trace leukocytes, otherwise unremarkable. Urine is clear and yellow.    1245 Jonathan PETE notified of lab results. No abnormal results detected. BPs stable 110s/60s and 120s/80s. Orders to DC pt with instructions to return to hospital if ny s/s of high BP (HA not relieved by tylenol, excessive swelling in hands/face/feet, epigastric pain, vision changes). Pt understands. Pt will need to return to Dr. Castillo's office tomorrow for 2/2 betamethasone shots. Pt understands.    1340 pt off floor. Stable and alert. Steady gait.

## 2018-02-03 LAB — GP B STREP DNA SPEC QL NAA+PROBE: POSITIVE

## 2018-02-05 ENCOUNTER — HOSPITAL ENCOUNTER (OUTPATIENT)
Facility: MEDICAL CENTER | Age: 33
End: 2018-02-06
Attending: OBSTETRICS & GYNECOLOGY | Admitting: OBSTETRICS & GYNECOLOGY
Payer: COMMERCIAL

## 2018-02-05 LAB
A1 MICROGLOB PLACENTAL VAG QL: NEGATIVE
ALBUMIN SERPL BCP-MCNC: 3.3 G/DL (ref 3.2–4.9)
ALBUMIN/GLOB SERPL: 1.1 G/DL
ALP SERPL-CCNC: 77 U/L (ref 30–99)
ALT SERPL-CCNC: 14 U/L (ref 2–50)
ANION GAP SERPL CALC-SCNC: 9 MMOL/L (ref 0–11.9)
AST SERPL-CCNC: 19 U/L (ref 12–45)
BASOPHILS # BLD AUTO: 0.2 % (ref 0–1.8)
BASOPHILS # BLD: 0.02 K/UL (ref 0–0.12)
BILIRUB SERPL-MCNC: 0.3 MG/DL (ref 0.1–1.5)
BUN SERPL-MCNC: 6 MG/DL (ref 8–22)
CALCIUM SERPL-MCNC: 8.9 MG/DL (ref 8.5–10.5)
CHLORIDE SERPL-SCNC: 107 MMOL/L (ref 96–112)
CO2 SERPL-SCNC: 21 MMOL/L (ref 20–33)
CREAT SERPL-MCNC: 0.44 MG/DL (ref 0.5–1.4)
CREAT UR-MCNC: 19.8 MG/DL
EOSINOPHIL # BLD AUTO: 0.08 K/UL (ref 0–0.51)
EOSINOPHIL NFR BLD: 0.6 % (ref 0–6.9)
ERYTHROCYTE [DISTWIDTH] IN BLOOD BY AUTOMATED COUNT: 43.6 FL (ref 35.9–50)
GLOBULIN SER CALC-MCNC: 2.9 G/DL (ref 1.9–3.5)
GLUCOSE SERPL-MCNC: 85 MG/DL (ref 65–99)
HCT VFR BLD AUTO: 40.1 % (ref 37–47)
HGB BLD-MCNC: 13.9 G/DL (ref 12–16)
HOLDING TUBE BB 8507: NORMAL
IMM GRANULOCYTES # BLD AUTO: 0.09 K/UL (ref 0–0.11)
IMM GRANULOCYTES NFR BLD AUTO: 0.7 % (ref 0–0.9)
LYMPHOCYTES # BLD AUTO: 1.83 K/UL (ref 1–4.8)
LYMPHOCYTES NFR BLD: 14.8 % (ref 22–41)
MCH RBC QN AUTO: 31.6 PG (ref 27–33)
MCHC RBC AUTO-ENTMCNC: 34.7 G/DL (ref 33.6–35)
MCV RBC AUTO: 91.1 FL (ref 81.4–97.8)
MONOCYTES # BLD AUTO: 1.27 K/UL (ref 0–0.85)
MONOCYTES NFR BLD AUTO: 10.3 % (ref 0–13.4)
NEUTROPHILS # BLD AUTO: 9.09 K/UL (ref 2–7.15)
NEUTROPHILS NFR BLD: 73.4 % (ref 44–72)
NRBC # BLD AUTO: 0 K/UL
NRBC BLD-RTO: 0 /100 WBC
PLATELET # BLD AUTO: 228 K/UL (ref 164–446)
PMV BLD AUTO: 9.9 FL (ref 9–12.9)
POTASSIUM SERPL-SCNC: 3.7 MMOL/L (ref 3.6–5.5)
PROT SERPL-MCNC: 6.2 G/DL (ref 6–8.2)
PROT UR-MCNC: <4 MG/DL (ref 0–15)
PROT/CREAT UR: NORMAL MG/G (ref 10–107)
RBC # BLD AUTO: 4.4 M/UL (ref 4.2–5.4)
SODIUM SERPL-SCNC: 137 MMOL/L (ref 135–145)
URATE SERPL-MCNC: 4.8 MG/DL (ref 1.9–8.2)
WBC # BLD AUTO: 12.4 K/UL (ref 4.8–10.8)

## 2018-02-05 PROCEDURE — 84156 ASSAY OF PROTEIN URINE: CPT

## 2018-02-05 PROCEDURE — 80053 COMPREHEN METABOLIC PANEL: CPT

## 2018-02-05 PROCEDURE — 82570 ASSAY OF URINE CREATININE: CPT

## 2018-02-05 PROCEDURE — 84550 ASSAY OF BLOOD/URIC ACID: CPT

## 2018-02-05 PROCEDURE — G0378 HOSPITAL OBSERVATION PER HR: HCPCS

## 2018-02-05 PROCEDURE — 59025 FETAL NON-STRESS TEST: CPT | Performed by: OBSTETRICS & GYNECOLOGY

## 2018-02-05 PROCEDURE — 85025 COMPLETE CBC W/AUTO DIFF WBC: CPT

## 2018-02-05 PROCEDURE — 84112 EVAL AMNIOTIC FLUID PROTEIN: CPT

## 2018-02-05 PROCEDURE — 700105 HCHG RX REV CODE 258: Performed by: OBSTETRICS & GYNECOLOGY

## 2018-02-05 RX ORDER — SODIUM CHLORIDE, SODIUM LACTATE, POTASSIUM CHLORIDE, CALCIUM CHLORIDE 600; 310; 30; 20 MG/100ML; MG/100ML; MG/100ML; MG/100ML
INJECTION, SOLUTION INTRAVENOUS CONTINUOUS
Status: DISCONTINUED | OUTPATIENT
Start: 2018-02-05 | End: 2018-02-06 | Stop reason: HOSPADM

## 2018-02-05 RX ORDER — SODIUM CHLORIDE, SODIUM LACTATE, POTASSIUM CHLORIDE, CALCIUM CHLORIDE 600; 310; 30; 20 MG/100ML; MG/100ML; MG/100ML; MG/100ML
1000 INJECTION, SOLUTION INTRAVENOUS CONTINUOUS
Status: DISCONTINUED | OUTPATIENT
Start: 2018-02-05 | End: 2018-02-06 | Stop reason: HOSPADM

## 2018-02-05 RX ADMIN — SODIUM CHLORIDE, POTASSIUM CHLORIDE, SODIUM LACTATE AND CALCIUM CHLORIDE: 600; 310; 30; 20 INJECTION, SOLUTION INTRAVENOUS at 19:47

## 2018-02-05 RX ADMIN — SODIUM CHLORIDE, POTASSIUM CHLORIDE, SODIUM LACTATE AND CALCIUM CHLORIDE: 600; 310; 30; 20 INJECTION, SOLUTION INTRAVENOUS at 11:54

## 2018-02-05 RX ADMIN — SODIUM CHLORIDE, POTASSIUM CHLORIDE, SODIUM LACTATE AND CALCIUM CHLORIDE 1000 ML: 600; 310; 30; 20 INJECTION, SOLUTION INTRAVENOUS at 10:55

## 2018-02-05 ASSESSMENT — PAIN SCALES - GENERAL
PAINLEVEL_OUTOF10: 1
PAINLEVEL_OUTOF10: 0
PAINLEVEL_OUTOF10: 0
PAINLEVEL_OUTOF10: 4

## 2018-02-05 NOTE — PROGRESS NOTES
1030- 32 yr old, G3PO, EDC 3/04/18, 36.1wks. Was sent from the office for PIH labs due to GHTN. Pt to be IV hydrated for oligo. Received report from MIKE Winkler RN. Pt denies LOF, VB, or UC's. Pt reports +FM. Pt denies epigastric pain, blurred vision, or HA.  IV placed, LR running, amniosure collected, labs and urine sent per MD order.  1220-  Dr. Castillo updated on lab results, protein/cr ratio and amniosure. Labs and protein WDL and amniosure-negative. BP WDL. Cat 1 tracing. Orders to turn LR rate to 125mL/hr.  1245- Dr. Holbrook notified that pt is requesting ETA on US, Dr. Holbrook to evaluate pt later this afternoon. Pt informed of US later this afternoon. Pt eating lunch.  1500- pt notfied RN of HA 4/10 discomfort. Ice pack and tea provided. Pt instructed to call for increased discomfort.  1600- pt feeling better, HA subsiding.  1645- Dr. Holbrook at the bedside, bedside US performed.  1735- Dr. Castillo and Dr. Holbrook would like pt to have overnight obs for continuous fetal monitoring.  1900- Report given to SWATI Peralta RN.

## 2018-02-06 VITALS
HEART RATE: 80 BPM | TEMPERATURE: 98 F | BODY MASS INDEX: 31.18 KG/M2 | RESPIRATION RATE: 16 BRPM | SYSTOLIC BLOOD PRESSURE: 126 MMHG | DIASTOLIC BLOOD PRESSURE: 77 MMHG | WEIGHT: 176 LBS | HEIGHT: 63 IN

## 2018-02-06 PROCEDURE — 700105 HCHG RX REV CODE 258: Performed by: OBSTETRICS & GYNECOLOGY

## 2018-02-06 PROCEDURE — G0378 HOSPITAL OBSERVATION PER HR: HCPCS

## 2018-02-06 PROCEDURE — 59025 FETAL NON-STRESS TEST: CPT | Performed by: OBSTETRICS & GYNECOLOGY

## 2018-02-06 RX ADMIN — SODIUM CHLORIDE, POTASSIUM CHLORIDE, SODIUM LACTATE AND CALCIUM CHLORIDE: 600; 310; 30; 20 INJECTION, SOLUTION INTRAVENOUS at 03:39

## 2018-02-06 ASSESSMENT — PAIN SCALES - GENERAL
PAINLEVEL_OUTOF10: 0
PAINLEVEL_OUTOF10: 0

## 2018-02-06 NOTE — PROGRESS NOTES
0700 - Report received from Jennifer ANNE RN, care assumed.   Patient is resting quietly, denies pain/contractions/cramping. Reports FM. Denies SROM or Bleeding.   Denies change to vision/edema/HA. DTR's 1+.   Patient denies questions/concerns/needs. Patient encouraged to call with needs PRN.     7094 - Dr. Castillo at  to discuss current status and discharge home. Patient to f/u with Dr. Castillo tomorrow in his office. Patient discharged home with specific instruction to return to L&D/Physician ie.. Bleeding/ROM/decreased FM/labor/concerns for self or baby.

## 2018-02-06 NOTE — PROGRESS NOTES
1900- Report received from KULDEEP Taveras RN. Pt resting in bed. Patient's mother at bedside. POC discussed. Care assumed.     0600- Pt able to sleep some throughout the night. Denies pain, LOF, VB. Reports +FM.      0700- Report to MIKE Pierce RN. POC discussed. Care assumed by day shift RN.

## 2018-02-06 NOTE — CONSULTS
DATE OF SERVICE:  2018    REASON FOR CONSULTATION:  Oligohydramnios.    HISTORY OF PRESENT ILLNESS:  A 32-year-old  AB3, EDC 2018,   currently at 36 weeks and 1 day.  Patient has been under the care of Dr. Kannan Castillo and about a month ago blood pressure was noted to be elevated and   has been intermittent.  Patient has had a couple of evaluations in the   hospital.  Each time, her blood pressures have normalized.    She did receive corticosteroids previously.  Patient had an ultrasound today   demonstrating decreased amniotic fluid with an ALISA of approximately 3.  This   has been a new development as on  she had an ALISA of 11.  An   AmniSure was done, which was negative.    PAST MEDICAL HISTORY:  She denies any major medical problems, asthma,   seizures, hypertension, cardiovascular, GI or  diseases.    PREVIOUS OPERATIONS:  None.    TRANSFUSIONS:  None.    DRUG ALLERGIES:  PERCOCET CAUSES ITCHING.    HABITS:  None.    PHYSICAL EXAMINATION:  GENERAL:  Well-developed, well-nourished female, alert and oriented x3 in no   acute distress.  VITAL SIGNS:  Blood pressure has been running in the 120s/70s; however, in the   office, she has been known to have elevated pressures.  HEENT:  Within normal limits.  ABDOMEN:  Soft.  Gravid uterus.  EXTREMITIES:  1-2+ pitting edema.  Reflex 1+.    Bedside ultrasound reveals schultz fetus, breech presentation, ALISA of 3.87,   largest pocket 2.45 cm.  Baby is measuring small for dates.  Estimated fetal   weight 2385 g.  Fetal heart rate tracing is reactive with accelerations,   moderate variability.    ASSESSMENT:  1.  Intrauterine pregnancy at 36 weeks and 1 day.  2.  Intrauterine growth restriction.  3.  Decreased amniotic fluid, does not meet oligohydramnios criteria.  4.  Normal S/D ratio.    PLAN:  The case was discussed with Dr. Kannan Castillo.  At the present time with   ALISA not meeting oligohydramnios, normal S/D ratio, I would just recommend    overnight monitoring.  If no recurrent variable decelerations observed, then   she can be discharged and followed as an outpatient.  However, with evidence   of gestational hypertension, consider delivery by 37 weeks.    We discussed the situation with the patient and her .  At the present   time, baby has a reactive NST and we discussed the plan and they are   comfortable with this.    Should obviously there be any worsening disease, delivery should be indicated.    ADDENDUM:    Hematocrit 40.1%, platelet count 228,000.  Electrolytes are   normal.  BUN of 6.  Uric acid 4.8.  Liver enzymes are normal.    Time:  85 minutes       ____________________________________     MD BARRY VILLAGOMEZ / KARL    DD:  02/05/2018 17:36:26  DT:  02/05/2018 22:02:38    D#:  [id number]  Job#:  [id number]

## 2018-02-07 NOTE — H&P
DATE OF ADMISSION:  2018    CHIEF COMPLAINT:  Elevated blood pressure, low fluid, and slight headache.    HISTORY OF PRESENT ILLNESS:  This is a 32-year-old  female -0-3-0 with intrauterine pregnancy at 36 and 1/7 weeks who came to clinic   today with complaints of slight headache.  Her blood pressure in the office   was 146/90.  She has been followed for gestational hypertension with elevated   blood pressures in the mild range in the office.  She had an ultrasound   performed showing a BPP of 6/8 with an ALISA of 3.81 cm and 1 pocket of 2.53 cm.    Secondary to her findings, she was sent over to the hospital for evaluation.    She had normal preeclamptic labs.  Her protein to creatinine ratio was   normal.  Her headache resolved while at the hospital.  While in the hospital,   she had MFM consult who verified the same amount of fluid, but also had a   pocket greater than 2 cm.  He recommended monitoring overnight to watch for   cord compression issues and to follow blood pressures.  Blood pressures   otherwise were normal.  She otherwise has good fetal movement.  No vaginal   bleeding, no contractions, no loss of fluid.  An AmniSure was negative as   well.  Her NST is category 1, reactive tracing.    PAST MEDICAL HISTORY:  Significant for abnormal Pap smears in the past, anemia   in the past, and chlamydia in the distant past.    FAMILY HISTORY:  None.    PAST SURGICAL HISTORY:  Breast augmentation, D and C, and wisdom teeth.    SOCIAL HISTORY:  Denies tobacco, alcohol, or drug use.    SEXUAL HISTORY:  Denies history of herpes for herself or father of the baby.    ALLERGIES:  PERCOCET.    MEDICATIONS:  Vitamins.    REVIEW OF SYSTEMS:  As in HPI, otherwise negative for greater than 10 systems.    Patient denies did not have any chest pain or heart palpitations.  No other   signs or symptoms of DVT other than slight swelling generally.    PHYSICAL EXAMINATION:  VITAL SIGNS:  Weight 175, blood  pressure 146/90 in the office, normal in the   hospital.  GENERAL:  No apparent distress.  HEENT:  Normal.  LUNGS:  Clear to auscultation bilaterally.  CARDIOVASCULAR:  Regular rate and rhythm.  ABDOMEN:  Gravid, soft, nontender, nondistended.  No masses.  No guarding or   rebound.  No peritoneal signs.  GENITOURINARY:  Cervix is closed, 20% effaced.  Uterus nontender.  Adnexa, no   masses.  EXTREMITIES:  1+ equal, lower extremities, bilateral edema; otherwise, no   signs or symptoms of preeclampsia.    Ultrasound, infant is breech presentation with an ALISA of 3.81.    ASSESSMENT:  1.  Intrauterine pregnancy at 36 and 1/7 weeks.  2.  Breech presentation.  3.  Gestational hypertension.  4.  Borderline low fluid/oligohydramnios.    PLAN:  The patient did not rule in for rupture.  Her blood pressures are   normal in the hospital while lying down.  Her laboratory work looks good.  Our   plan is to watch her overnight to watch for cord compression issues.  If she   does well, she can possibly go home tomorrow morning.  The patient does not   have enough fluid to warrant a version attempt.  If she needed delivery, she   would need .  I discussed this MFM and the plan would be to deliver   her some around 37 weeks if she continues to have blood pressure problems and   low fluid.  Answered questions.       ____________________________________     MD FARIBA ESTRADA / KARL    DD:  2018 17:41:41  DT:  2018 18:34:24    D#:  [id number]  Job#:  [id number]    cc: [facility]

## 2018-02-07 NOTE — DISCHARGE SUMMARY
ADMISSION DATE:  02/05/2018    DISCHARGE DATE:  02/06/2018    ADMISSION DIAGNOSES:  1.  Intrauterine pregnancy at 36-1/7 weeks.  2.  Gestational hypertension.  3.  Oligohydramnios.  4.  Breech presentation.    DISCHARGE DIAGNOSES:  1.  Intrauterine pregnancy at 36-2/7 weeks.  2.  Breech presentation.  3.  Gestational hypertension.  4.  Low fluid.    HOSPITAL COURSE:  Patient was admitted and monitored.  She does have   borderline low fluid at 3.81 cm and she did have 1 pocket greater than 2 cm.    We monitored her overnight for signs of cord compression issues.  She had a   category 1 reactive NST throughout the night with occasional contractions and   no decelerations.  She has good fetal movement while in the hospital.  She had   an AmniSure which returned as negative.  Her blood pressures were actually   normal in the hospital while on bed rest and while lying down.  Her   preeclamptic labs were normal and her protein-to-creatinine ratio was normal   as well.  We discussed with her risks, benefits, alternatives of different   therapies.  Our plan right now is to have the patient go home and be on   modified bed rest at home.  She will follow up in the office on Wednesday for   another ultrasound.  Otherwise, our plan is to deliver her Sunday or Monday   when she reaches 37 weeks for gestational hypertension and low fluid.  The   patient was asked to come back with any labor, loss of fluid, vaginal   bleeding, signs or symptoms of preeclampsia, or any other concerns.    FOLLOWUP APPOINTMENT:  Wednesday.    DISCHARGE MEDICATIONS:  Prenatal vitamins.       ____________________________________     MD FARIBA ESTRADA / KARL    DD:  02/06/2018 17:44:46  DT:  02/06/2018 23:51:00    D#:  [id number]  Job#:  [id number]

## 2018-02-09 NOTE — H&P
CHIEF COMPLAINT:  Breech presentation, suspected intrauterine growth   restriction, low fluid, gestational hypertension.    HISTORY OF PRESENT ILLNESS:  This is a 32-year-old  female G4,   P0-0-3-0 with intrauterine pregnancy at 37 weeks who presents with breech   presentation.  She has also had elevated blood pressures throughout the third   trimester, which resolved down to normal with bed rest.  She does not fit   criteria for preeclampsia.  She has breech presentation and has been also   monitored for borderline low fluids, but she has always had a pocket greater   than 2 cm.  She had a total ALISA as low as 3.81 cm.  On last growth scan a   couple of weeks ago, she had an overall total fetal growth in the 11th   percentile with abdominal circumference in the 9th percentile giving her   diagnosis of asymmetrical IUGR.  Secondary to all these findings, our plan is   to go ahead with delivery at 37 weeks and since she is in breech presentation   with low fluids, she would like a .  She does not have enough fluid   to warrant a version attempt.  She currently does not have any signs or   symptoms of preeclampsia.  She also received steroids and intense the patient   of delivery.  She is Rh positive, rubella immune.  She is GBS positive.    PAST MEDICAL HISTORY:  Significant for cervical dysplasia in the past and   anemia in the past.    FAMILY HISTORY:  None.    PAST SURGICAL HISTORY:  Breast augmentation, D and C, LEEP, wisdom teeth   removed.    SOCIAL HISTORY:  Denies tobacco, alcohol or drug use.    ALLERGIES:  PERCOCET.    MEDICATIONS:  Prenatal vitamins.    SEXUAL HISTORY:  Patient has a history of treated chlamydia in the past.  Her   cultures have been negative here.  She denies history of herpes for herself or   father of the baby.    REVIEW OF SYSTEMS:  As in HPI, otherwise negative for greater than 10 systems.    Patient did not have any chest pain or heart palpitations.  No signs or    symptoms of DVT.    PHYSICAL EXAMINATION:  VITAL SIGNS:  Blood pressures range from normal to mild range blood pressures.    Weight is 173 pounds, respirations 12.  GENERAL:  No apparent distress.  HEENT:  Normal.  LUNGS:  Clear to auscultation bilaterally.  CARDIOVASCULAR:  Regular rate and rhythm.  ABDOMEN:  Gravid, soft, nontender, nondistended.  No masses, no guarding, no   rebound.  No peritoneal signs.  GENITOURINARY:  External genitalia is normal, no lesions.  Vagina is normal,   no lesions.  Uterus is nontender.  EXTREMITIES:  No signs or symptoms of DVT.    ASSESSMENT:  1.  Intrauterine pregnancy at 37 weeks.  2.  Suspected intrauterine growth restriction.  3.  Gestational hypertension.  4.  Borderline low fluid.  5.  Breech presentation.  6.  Group B strep positive.    PLAN:  Counseled the patient on risks, benefits, and alternatives of different   therapies.  She now wants delivery.  She was counseled on the risks of   surgery, risks include but not limited to bleeding, infection, organ damage   (such as bowel, bladder, ureter, nerves, vessels, uterus, etc.), need for   further corrective procedures (such as re-surgery or colostomy), poor cosmetic   outcome, fistula formation, abscess formation, wound separation, wound   infection, inability of procedure of desired effect, cardiovascular risk,   anesthesia risks, need for C-sections in the future, placenta previa or   placenta accreta formation, need for blood transfusion, infertility, need for   hysterectomy, disability, death, etc.  She accepts these risks and desired to   proceed with procedure.  She is also counseled on risks to infant risks   include but not limited to bleeding, infection, organ damage, permanent   markings, neuropathy, disability, death, etc. and she accepts these risks as   well.  We are planning a primary low transverse  and any necessary   Procedures.    I have spoken with patient today and there are no changes to her  situation and the H&p requiers no updates.       ____________________________________     MD FARIBA ESTRADA    DD:  02/09/2018 09:24:34  DT:  02/09/2018 10:05:15    D#:  [id number]  Job#:  [id number]

## 2018-02-11 ENCOUNTER — HOSPITAL ENCOUNTER (INPATIENT)
Facility: MEDICAL CENTER | Age: 33
LOS: 4 days | End: 2018-02-15
Attending: OBSTETRICS & GYNECOLOGY | Admitting: OBSTETRICS & GYNECOLOGY
Payer: COMMERCIAL

## 2018-02-11 DIAGNOSIS — G89.18 POSTOPERATIVE PAIN: ICD-10-CM

## 2018-02-11 LAB
BASOPHILS # BLD AUTO: 0.2 % (ref 0–1.8)
BASOPHILS # BLD: 0.02 K/UL (ref 0–0.12)
EOSINOPHIL # BLD AUTO: 0.1 K/UL (ref 0–0.51)
EOSINOPHIL NFR BLD: 1.1 % (ref 0–6.9)
ERYTHROCYTE [DISTWIDTH] IN BLOOD BY AUTOMATED COUNT: 42.7 FL (ref 35.9–50)
ERYTHROCYTE [DISTWIDTH] IN BLOOD BY AUTOMATED COUNT: 42.8 FL (ref 35.9–50)
HCT VFR BLD AUTO: 37 % (ref 37–47)
HCT VFR BLD AUTO: 39.6 % (ref 37–47)
HGB BLD-MCNC: 12.7 G/DL (ref 12–16)
HGB BLD-MCNC: 14 G/DL (ref 12–16)
HOLDING TUBE BB 8507: NORMAL
IMM GRANULOCYTES # BLD AUTO: 0.05 K/UL (ref 0–0.11)
IMM GRANULOCYTES NFR BLD AUTO: 0.5 % (ref 0–0.9)
LYMPHOCYTES # BLD AUTO: 1.62 K/UL (ref 1–4.8)
LYMPHOCYTES NFR BLD: 17.1 % (ref 22–41)
MCH RBC QN AUTO: 31.4 PG (ref 27–33)
MCH RBC QN AUTO: 31.9 PG (ref 27–33)
MCHC RBC AUTO-ENTMCNC: 34.3 G/DL (ref 33.6–35)
MCHC RBC AUTO-ENTMCNC: 35.4 G/DL (ref 33.6–35)
MCV RBC AUTO: 90.2 FL (ref 81.4–97.8)
MCV RBC AUTO: 91.4 FL (ref 81.4–97.8)
MONOCYTES # BLD AUTO: 0.82 K/UL (ref 0–0.85)
MONOCYTES NFR BLD AUTO: 8.6 % (ref 0–13.4)
NEUTROPHILS # BLD AUTO: 6.88 K/UL (ref 2–7.15)
NEUTROPHILS NFR BLD: 72.5 % (ref 44–72)
NRBC # BLD AUTO: 0 K/UL
NRBC BLD-RTO: 0 /100 WBC
PLATELET # BLD AUTO: 160 K/UL (ref 164–446)
PLATELET # BLD AUTO: 198 K/UL (ref 164–446)
PMV BLD AUTO: 9.3 FL (ref 9–12.9)
PMV BLD AUTO: 9.5 FL (ref 9–12.9)
RBC # BLD AUTO: 4.05 M/UL (ref 4.2–5.4)
RBC # BLD AUTO: 4.39 M/UL (ref 4.2–5.4)
WBC # BLD AUTO: 15.1 K/UL (ref 4.8–10.8)
WBC # BLD AUTO: 9.5 K/UL (ref 4.8–10.8)

## 2018-02-11 PROCEDURE — 90707 MMR VACCINE SC: CPT

## 2018-02-11 PROCEDURE — A9270 NON-COVERED ITEM OR SERVICE: HCPCS | Performed by: OBSTETRICS & GYNECOLOGY

## 2018-02-11 PROCEDURE — 700111 HCHG RX REV CODE 636 W/ 250 OVERRIDE (IP): Performed by: ANESTHESIOLOGY

## 2018-02-11 PROCEDURE — 36415 COLL VENOUS BLD VENIPUNCTURE: CPT

## 2018-02-11 PROCEDURE — 3E0134Z INTRODUCTION OF SERUM, TOXOID AND VACCINE INTO SUBCUTANEOUS TISSUE, PERCUTANEOUS APPROACH: ICD-10-PCS | Performed by: OBSTETRICS & GYNECOLOGY

## 2018-02-11 PROCEDURE — 306288 HCHG RETRACTOR C SECTION LG

## 2018-02-11 PROCEDURE — 700112 HCHG RX REV CODE 229: Performed by: OBSTETRICS & GYNECOLOGY

## 2018-02-11 PROCEDURE — 90471 IMMUNIZATION ADMIN: CPT

## 2018-02-11 PROCEDURE — 59514 CESAREAN DELIVERY ONLY: CPT

## 2018-02-11 PROCEDURE — 305385 HCHG SURGICAL SERVICES 1/4 HOUR: Performed by: OBSTETRICS & GYNECOLOGY

## 2018-02-11 PROCEDURE — 304964 HCHG RECOVERY ROOM TIME 1HR: Performed by: OBSTETRICS & GYNECOLOGY

## 2018-02-11 PROCEDURE — 304966 HCHG RECOVERY SVSC TIME ADDL 1/2 HR: Performed by: OBSTETRICS & GYNECOLOGY

## 2018-02-11 PROCEDURE — 85025 COMPLETE CBC W/AUTO DIFF WBC: CPT

## 2018-02-11 PROCEDURE — 700105 HCHG RX REV CODE 258

## 2018-02-11 PROCEDURE — 770001 HCHG ROOM/CARE - MED/SURG/GYN PRIV*

## 2018-02-11 PROCEDURE — A9270 NON-COVERED ITEM OR SERVICE: HCPCS | Performed by: ANESTHESIOLOGY

## 2018-02-11 PROCEDURE — 85027 COMPLETE CBC AUTOMATED: CPT

## 2018-02-11 PROCEDURE — 4A1HX4Z MONITORING OF PRODUCTS OF CONCEPTION, CARDIAC ELECTRICAL ACTIVITY, EXTERNAL APPROACH: ICD-10-PCS | Performed by: OBSTETRICS & GYNECOLOGY

## 2018-02-11 PROCEDURE — 700105 HCHG RX REV CODE 258: Performed by: ANESTHESIOLOGY

## 2018-02-11 PROCEDURE — 700102 HCHG RX REV CODE 250 W/ 637 OVERRIDE(OP): Performed by: ANESTHESIOLOGY

## 2018-02-11 PROCEDURE — 700111 HCHG RX REV CODE 636 W/ 250 OVERRIDE (IP)

## 2018-02-11 PROCEDURE — 306828 HCHG ANES-TIME GENERAL: Performed by: OBSTETRICS & GYNECOLOGY

## 2018-02-11 RX ORDER — SODIUM CHLORIDE, SODIUM LACTATE, POTASSIUM CHLORIDE, CALCIUM CHLORIDE 600; 310; 30; 20 MG/100ML; MG/100ML; MG/100ML; MG/100ML
1500 INJECTION, SOLUTION INTRAVENOUS ONCE
Status: COMPLETED | OUTPATIENT
Start: 2018-02-11 | End: 2018-02-11

## 2018-02-11 RX ORDER — CITRIC ACID/SODIUM CITRATE 334-500MG
30 SOLUTION, ORAL ORAL ONCE
Status: COMPLETED | OUTPATIENT
Start: 2018-02-11 | End: 2018-02-11

## 2018-02-11 RX ORDER — HYDROCODONE BITARTRATE AND ACETAMINOPHEN 5; 325 MG/1; MG/1
1 TABLET ORAL EVERY 4 HOURS PRN
Status: DISCONTINUED | OUTPATIENT
Start: 2018-02-11 | End: 2018-02-12

## 2018-02-11 RX ORDER — MISOPROSTOL 200 UG/1
600 TABLET ORAL
Status: DISCONTINUED | OUTPATIENT
Start: 2018-02-11 | End: 2018-02-15 | Stop reason: HOSPADM

## 2018-02-11 RX ORDER — NALOXONE HYDROCHLORIDE 0.4 MG/ML
0.1 INJECTION, SOLUTION INTRAMUSCULAR; INTRAVENOUS; SUBCUTANEOUS PRN
Status: DISCONTINUED | OUTPATIENT
Start: 2018-02-11 | End: 2018-02-12

## 2018-02-11 RX ORDER — RANITIDINE 150 MG/1
150 TABLET ORAL PRN
COMMUNITY
End: 2020-09-18

## 2018-02-11 RX ORDER — KETOROLAC TROMETHAMINE 30 MG/ML
30 INJECTION, SOLUTION INTRAMUSCULAR; INTRAVENOUS EVERY 6 HOURS
Status: DISCONTINUED | OUTPATIENT
Start: 2018-02-11 | End: 2018-02-11

## 2018-02-11 RX ORDER — KETOROLAC TROMETHAMINE 30 MG/ML
30 INJECTION, SOLUTION INTRAMUSCULAR; INTRAVENOUS EVERY 6 HOURS
Status: DISCONTINUED | OUTPATIENT
Start: 2018-02-11 | End: 2018-02-12

## 2018-02-11 RX ORDER — SODIUM CHLORIDE, SODIUM LACTATE, POTASSIUM CHLORIDE, CALCIUM CHLORIDE 600; 310; 30; 20 MG/100ML; MG/100ML; MG/100ML; MG/100ML
INJECTION, SOLUTION INTRAVENOUS CONTINUOUS
Status: CANCELLED | OUTPATIENT
Start: 2018-02-11

## 2018-02-11 RX ORDER — SIMETHICONE 80 MG
80 TABLET,CHEWABLE ORAL 4 TIMES DAILY PRN
Status: DISCONTINUED | OUTPATIENT
Start: 2018-02-11 | End: 2018-02-15 | Stop reason: HOSPADM

## 2018-02-11 RX ORDER — METOCLOPRAMIDE HYDROCHLORIDE 5 MG/ML
10 INJECTION INTRAMUSCULAR; INTRAVENOUS ONCE
Status: COMPLETED | OUTPATIENT
Start: 2018-02-11 | End: 2018-02-11

## 2018-02-11 RX ORDER — DIPHENHYDRAMINE HYDROCHLORIDE 50 MG/ML
12.5 INJECTION INTRAMUSCULAR; INTRAVENOUS EVERY 6 HOURS PRN
Status: DISCONTINUED | OUTPATIENT
Start: 2018-02-11 | End: 2018-02-12

## 2018-02-11 RX ORDER — SODIUM CHLORIDE, SODIUM LACTATE, POTASSIUM CHLORIDE, CALCIUM CHLORIDE 600; 310; 30; 20 MG/100ML; MG/100ML; MG/100ML; MG/100ML
INJECTION, SOLUTION INTRAVENOUS PRN
Status: DISCONTINUED | OUTPATIENT
Start: 2018-02-11 | End: 2018-02-15 | Stop reason: HOSPADM

## 2018-02-11 RX ORDER — SODIUM CHLORIDE, SODIUM LACTATE, POTASSIUM CHLORIDE, CALCIUM CHLORIDE 600; 310; 30; 20 MG/100ML; MG/100ML; MG/100ML; MG/100ML
INJECTION, SOLUTION INTRAVENOUS CONTINUOUS
Status: DISCONTINUED | OUTPATIENT
Start: 2018-02-11 | End: 2018-02-15 | Stop reason: HOSPADM

## 2018-02-11 RX ORDER — HYDROMORPHONE HYDROCHLORIDE 2 MG/ML
0.4 INJECTION, SOLUTION INTRAMUSCULAR; INTRAVENOUS; SUBCUTANEOUS
Status: DISCONTINUED | OUTPATIENT
Start: 2018-02-11 | End: 2018-02-12

## 2018-02-11 RX ORDER — ONDANSETRON 2 MG/ML
4 INJECTION INTRAMUSCULAR; INTRAVENOUS EVERY 6 HOURS PRN
Status: DISCONTINUED | OUTPATIENT
Start: 2018-02-11 | End: 2018-02-12

## 2018-02-11 RX ORDER — METOCLOPRAMIDE HYDROCHLORIDE 5 MG/ML
10 INJECTION INTRAMUSCULAR; INTRAVENOUS EVERY 6 HOURS PRN
Status: DISCONTINUED | OUTPATIENT
Start: 2018-02-11 | End: 2018-02-12

## 2018-02-11 RX ORDER — VITAMIN A ACETATE, BETA CAROTENE, ASCORBIC ACID, CHOLECALCIFEROL, .ALPHA.-TOCOPHEROL ACETATE, DL-, THIAMINE MONONITRATE, RIBOFLAVIN, NIACINAMIDE, PYRIDOXINE HYDROCHLORIDE, FOLIC ACID, CYANOCOBALAMIN, CALCIUM CARBONATE, FERROUS FUMARATE, ZINC OXIDE, CUPRIC OXIDE 3080; 12; 120; 400; 1; 1.84; 3; 20; 22; 920; 25; 200; 27; 10; 2 [IU]/1; UG/1; MG/1; [IU]/1; MG/1; MG/1; MG/1; MG/1; MG/1; [IU]/1; MG/1; MG/1; MG/1; MG/1; MG/1
1 TABLET, FILM COATED ORAL EVERY MORNING
Status: DISCONTINUED | OUTPATIENT
Start: 2018-02-11 | End: 2018-02-15 | Stop reason: HOSPADM

## 2018-02-11 RX ORDER — DOCUSATE SODIUM 100 MG/1
100 CAPSULE, LIQUID FILLED ORAL 2 TIMES DAILY PRN
Status: DISCONTINUED | OUTPATIENT
Start: 2018-02-11 | End: 2018-02-15 | Stop reason: HOSPADM

## 2018-02-11 RX ORDER — SODIUM CHLORIDE, SODIUM LACTATE, POTASSIUM CHLORIDE, CALCIUM CHLORIDE 600; 310; 30; 20 MG/100ML; MG/100ML; MG/100ML; MG/100ML
INJECTION, SOLUTION INTRAVENOUS
Status: COMPLETED
Start: 2018-02-11 | End: 2018-02-11

## 2018-02-11 RX ORDER — HYDROMORPHONE HYDROCHLORIDE 2 MG/ML
0.2 INJECTION, SOLUTION INTRAMUSCULAR; INTRAVENOUS; SUBCUTANEOUS
Status: DISCONTINUED | OUTPATIENT
Start: 2018-02-11 | End: 2018-02-12

## 2018-02-11 RX ORDER — SODIUM CHLORIDE, SODIUM LACTATE, POTASSIUM CHLORIDE, CALCIUM CHLORIDE 600; 310; 30; 20 MG/100ML; MG/100ML; MG/100ML; MG/100ML
1000 INJECTION, SOLUTION INTRAVENOUS ONCE
Status: COMPLETED | OUTPATIENT
Start: 2018-02-11 | End: 2018-02-12

## 2018-02-11 RX ADMIN — SODIUM CHLORIDE, SODIUM LACTATE, POTASSIUM CHLORIDE, CALCIUM CHLORIDE 1000 ML: 600; 310; 30; 20 INJECTION, SOLUTION INTRAVENOUS at 05:50

## 2018-02-11 RX ADMIN — DOCUSATE SODIUM 100 MG: 100 CAPSULE ORAL at 22:24

## 2018-02-11 RX ADMIN — SODIUM CHLORIDE, POTASSIUM CHLORIDE, SODIUM LACTATE AND CALCIUM CHLORIDE 1000 ML: 600; 310; 30; 20 INJECTION, SOLUTION INTRAVENOUS at 07:17

## 2018-02-11 RX ADMIN — HYDROCODONE BITARTRATE AND ACETAMINOPHEN 1 TABLET: 5; 325 TABLET ORAL at 20:29

## 2018-02-11 RX ADMIN — MEASLES, MUMPS, AND RUBELLA VIRUS VACCINE LIVE 0.5 ML: 1000; 12500; 1000 INJECTION, POWDER, LYOPHILIZED, FOR SUSPENSION SUBCUTANEOUS at 20:27

## 2018-02-11 RX ADMIN — SODIUM CITRATE AND CITRIC ACID MONOHYDRATE 30 ML: 500; 334 SOLUTION ORAL at 07:11

## 2018-02-11 RX ADMIN — SODIUM CHLORIDE, POTASSIUM CHLORIDE, SODIUM LACTATE AND CALCIUM CHLORIDE 1000 ML: 600; 310; 30; 20 INJECTION, SOLUTION INTRAVENOUS at 05:50

## 2018-02-11 RX ADMIN — HYDROCODONE BITARTRATE AND ACETAMINOPHEN 1 TABLET: 5; 325 TABLET ORAL at 11:47

## 2018-02-11 RX ADMIN — KETOROLAC TROMETHAMINE 30 MG: 30 INJECTION, SOLUTION INTRAMUSCULAR at 14:25

## 2018-02-11 RX ADMIN — HYDROCODONE BITARTRATE AND ACETAMINOPHEN 1 TABLET: 5; 325 TABLET ORAL at 16:16

## 2018-02-11 RX ADMIN — KETOROLAC TROMETHAMINE 30 MG: 30 INJECTION, SOLUTION INTRAMUSCULAR at 20:32

## 2018-02-11 RX ADMIN — FAMOTIDINE 20 MG: 10 INJECTION, SOLUTION INTRAVENOUS at 07:11

## 2018-02-11 RX ADMIN — METOCLOPRAMIDE 10 MG: 5 INJECTION, SOLUTION INTRAMUSCULAR; INTRAVENOUS at 07:11

## 2018-02-11 ASSESSMENT — PATIENT HEALTH QUESTIONNAIRE - PHQ9
2. FEELING DOWN, DEPRESSED, IRRITABLE, OR HOPELESS: NOT AT ALL
SUM OF ALL RESPONSES TO PHQ QUESTIONS 1-9: 0
SUM OF ALL RESPONSES TO PHQ9 QUESTIONS 1 AND 2: 0
1. LITTLE INTEREST OR PLEASURE IN DOING THINGS: NOT AT ALL

## 2018-02-11 ASSESSMENT — COPD QUESTIONNAIRES
DO YOU EVER COUGH UP ANY MUCUS OR PHLEGM?: NO/ONLY WITH OCCASIONAL COLDS OR INFECTIONS
DURING THE PAST 4 WEEKS HOW MUCH DID YOU FEEL SHORT OF BREATH: NONE/LITTLE OF THE TIME
COPD SCREENING SCORE: 0
HAVE YOU SMOKED AT LEAST 100 CIGARETTES IN YOUR ENTIRE LIFE: NO/DON'T KNOW
DO YOU EVER COUGH UP ANY MUCUS OR PHLEGM?: NO/ONLY WITH OCCASIONAL COLDS OR INFECTIONS
COPD SCREENING SCORE: 0
DURING THE PAST 4 WEEKS HOW MUCH DID YOU FEEL SHORT OF BREATH: NONE/LITTLE OF THE TIME
HAVE YOU SMOKED AT LEAST 100 CIGARETTES IN YOUR ENTIRE LIFE: NO/DON'T KNOW

## 2018-02-11 ASSESSMENT — PAIN SCALES - GENERAL
PAINLEVEL_OUTOF10: 2
PAINLEVEL_OUTOF10: 0
PAINLEVEL_OUTOF10: 4
PAINLEVEL_OUTOF10: 0

## 2018-02-11 ASSESSMENT — LIFESTYLE VARIABLES
ALCOHOL_USE: NO
EVER_SMOKED: YES
DO YOU DRINK ALCOHOL: NO
DO YOU DRINK ALCOHOL: NO

## 2018-02-11 NOTE — CARE PLAN
Problem: Potential for postpartum infection related to surgical incision, compromised uterine condition, urinary tract or respiratory compromise  Goal: Patient will be afebrile and free from signs and symptoms of infection  Outcome: PROGRESSING AS EXPECTED  Patient has no signs/symptoms of infection.  Vital signs stable.     Problem: Alteration in comfort related to surgical incision and/or after birth pains  Goal: Patient is able to ambulate, care for self and infant with acceptable pain level  Outcome: PROGRESSING AS EXPECTED  Patient states pain control is adequate.

## 2018-02-11 NOTE — PROGRESS NOTES
0700- Received report from MANUEL Guerrero.  Assumed care.  Pt prepped for C/S.  0730- Pt moved to OR#1 ambulatory in stable condition.  0804- Delivery of a viable female, 8/9 apgars.  0851- Pt moved to PACU via gurney in stable condition.  0958- Pt moved to S350 via gurney in stable condition with baby in arms, FOB at side.  Report to MANUEL Johansen.

## 2018-02-11 NOTE — OR SURGEON
Immediate Post OP Note    PreOp Diagnosis: IUP @ 39 wks, breech, Gest HTN, Suspected asymmetrical IUGR    PostOp Diagnosis: Same    Procedure(s):  PRIMARY C SECTION - Wound Class: Clean Contaminated    Surgeon(s):  AMANDA Phillips M.D.    Anesthesiologist/Type of Anesthesia:  Anesthesiologist: Elizabeth Mcbride M.D./Spinal    Surgical Staff:  Circulator: Gayla Winkler R.N.  Scrub Person: Eveline Naylor  L&LUCIANO Baby  Nurse: Haily Parra R.N.    Specimens:  none    Estimated Blood Loss: 700cc    Findings: Female. Breech, AG=8/9 @ 0804, wt=6lb6oz, nml ut/tubes/ov, placenta intact 3 VC    Complications: None        2/11/2018 8:48 AM Kannan Castillo

## 2018-02-11 NOTE — OP REPORT
DATE OF SERVICE:  2018    PREOPERATIVE DIAGNOSES:  1.  Intrauterine pregnancy at 39 weeks.  2.  Gestational hypertension.  3.  Suspected asymmetrical intrauterine growth restriction.    POSTOPERATIVE DIAGNOSES:  1.  Intrauterine pregnancy at 39 weeks.  2.  Gestational hypertension.  3.  Suspected asymmetrical intrauterine growth restriction.    PROCEDURE:  Primary low transverse  via Pfannenstiel skin incision.    SURGEON:  Kannan Castillo MD    ASSISTANT:  Rodri Moss MD    ANESTHESIOLOGIST:  Elizabeth Mcbride MD    ANESTHESIA:  Spinal.    INDICATIONS:  Please see history and physical for indications.  Patient also   had borderline oligohydramnios.    DESCRIPTION OF PROCEDURE:  Patient was taken to the operating room where   spinal anesthesia was found to be adequate.  She was then prepared and draped   in normal sterile fashion in spine position with leftward tilt.  Pfannenstiel   skin incision was then made sharply through the skin, carried down to   underlying fascia.  Fascia was nicked in the midline, extended laterally and   superiorly, and dissected from the underlying rectus muscles.  Rectus muscles    in the midline, peritoneum identified, tented up, entered in   sharply, and this entrance was extended superiorly and inferiorly taking care   to avoid abdominal contents such as bladder and bowel.  Mariano O retractor was   placed, the bladder flap created.  Uterus was incised in a low transverse   fashion and was extended laterally and superiorly.  There was only scant   fluid.  Infant delivered in a breech manner.  Only gentle traction was used in   aid of delivery of the infant.  Infant was rotated to sacrum anterior and   delivered the sacrum.  We then delivered to the level of the scapulas, arms   were swept, head flexed and the infant delivered.  Cord was allowed to   pulsate.  There was instantaneous spontaneous cry.  Cord was eventually   clamped and cut.  Placenta delivered  intact, 3-vessel cord shortly thereafter.    Pitocin was infused IV.  Uterus was cleared of clot and debris.  Uterine   incision was closed in 2 layers, first layer with 0 PDS in a running lock   layer, second layer with 0 PDS in a running imbricating layer.  There was one   small area of bleeding along the incision, which was made hemostatic with 2-0   chromic in a figure-of-eight fashion.  Peritoneal layers, muscle layers, and   fascial layers were explored for any areas of bleeding and any areas of   bleeding were bovied dry.  Peritoneum was closed with 2-0 Vicryl in a running   fashion, pyramidalis muscles were reapproximated with 2-0 Vicryl in   interrupted fashion.  Fascia was closed with 0 Vicryl in a running fashion.    Subcuticular fatty layer was irrigated, bovied dry of any bleeding,   reapproximated with 3-0 Vicryl in a running fashion.  Skin was closed with 4-0   Monocryl in a subcuticular fashion.    Patient tolerated the procedure well.  Circulating staff announced the sponge,   lap, and needle counts correct.  Patient was taken to recovery room in stable   condition.    COMPLICATIONS:  None.    COUNTS:  Correct.       ____________________________________     MD FARIBA ESTRADA / KARL    DD:  02/11/2018 08:56:25  DT:  02/11/2018 09:19:28    D#:  [id number]  Job#:  [id number]    cc: [facility]

## 2018-02-11 NOTE — PROGRESS NOTES
0519- Pt arrived to L&D for primary  for Breech, low ALISA, and gestational hypertension. Pt is  with an Percy of 3/6 making her 37w. EFM and TOCO applied. +Fm, -VB, -Lof and -Uc reported. IV started, labs drawn, admission profile completed. Pt prepped for c section.   0700- Report given to Gayla JACKSON.

## 2018-02-11 NOTE — CONSULTS
Lactation visit done. Baby skin to skin. Breast offered, but baby not interested, sleepy. Mother states baby did breastfeed well after delivery with deep , comfortable latch.  Enc to keep baby skin to skin and watch for hunger cues, then offer breast. Call for bfdg help as needed. Given postpartum booklet and attention drawn to feeding frequencies, stooling norms,breastfeeding log and skin to skin pages.

## 2018-02-12 PROCEDURE — 700102 HCHG RX REV CODE 250 W/ 637 OVERRIDE(OP): Performed by: OBSTETRICS & GYNECOLOGY

## 2018-02-12 PROCEDURE — 700102 HCHG RX REV CODE 250 W/ 637 OVERRIDE(OP): Performed by: ANESTHESIOLOGY

## 2018-02-12 PROCEDURE — 770001 HCHG ROOM/CARE - MED/SURG/GYN PRIV*

## 2018-02-12 PROCEDURE — 700112 HCHG RX REV CODE 229: Performed by: OBSTETRICS & GYNECOLOGY

## 2018-02-12 PROCEDURE — A9270 NON-COVERED ITEM OR SERVICE: HCPCS | Performed by: OBSTETRICS & GYNECOLOGY

## 2018-02-12 PROCEDURE — A9270 NON-COVERED ITEM OR SERVICE: HCPCS | Performed by: ANESTHESIOLOGY

## 2018-02-12 RX ORDER — KETOROLAC TROMETHAMINE 30 MG/ML
30 INJECTION, SOLUTION INTRAMUSCULAR; INTRAVENOUS EVERY 6 HOURS
Status: DISCONTINUED | OUTPATIENT
Start: 2018-02-12 | End: 2018-02-12

## 2018-02-12 RX ORDER — IBUPROFEN 600 MG/1
600 TABLET ORAL EVERY 6 HOURS PRN
Status: DISCONTINUED | OUTPATIENT
Start: 2018-02-13 | End: 2018-02-12

## 2018-02-12 RX ORDER — IBUPROFEN 600 MG/1
600 TABLET ORAL EVERY 6 HOURS PRN
Status: DISCONTINUED | OUTPATIENT
Start: 2018-02-12 | End: 2018-02-15 | Stop reason: HOSPADM

## 2018-02-12 RX ORDER — ONDANSETRON 2 MG/ML
4 INJECTION INTRAMUSCULAR; INTRAVENOUS EVERY 6 HOURS PRN
Status: DISCONTINUED | OUTPATIENT
Start: 2018-02-12 | End: 2018-02-15 | Stop reason: HOSPADM

## 2018-02-12 RX ORDER — DIPHENHYDRAMINE HYDROCHLORIDE 50 MG/ML
25 INJECTION INTRAMUSCULAR; INTRAVENOUS EVERY 6 HOURS PRN
Status: DISCONTINUED | OUTPATIENT
Start: 2018-02-12 | End: 2018-02-15 | Stop reason: HOSPADM

## 2018-02-12 RX ORDER — HYDROCODONE BITARTRATE AND ACETAMINOPHEN 10; 325 MG/1; MG/1
1 TABLET ORAL EVERY 4 HOURS PRN
Status: DISCONTINUED | OUTPATIENT
Start: 2018-02-12 | End: 2018-02-15 | Stop reason: HOSPADM

## 2018-02-12 RX ORDER — HYDROCODONE BITARTRATE AND ACETAMINOPHEN 5; 325 MG/1; MG/1
1 TABLET ORAL EVERY 4 HOURS PRN
Status: DISCONTINUED | OUTPATIENT
Start: 2018-02-12 | End: 2018-02-15 | Stop reason: HOSPADM

## 2018-02-12 RX ORDER — ONDANSETRON 4 MG/1
4 TABLET, ORALLY DISINTEGRATING ORAL EVERY 6 HOURS PRN
Status: DISCONTINUED | OUTPATIENT
Start: 2018-02-12 | End: 2018-02-15 | Stop reason: HOSPADM

## 2018-02-12 RX ORDER — MORPHINE SULFATE 4 MG/ML
4 INJECTION, SOLUTION INTRAMUSCULAR; INTRAVENOUS
Status: DISCONTINUED | OUTPATIENT
Start: 2018-02-12 | End: 2018-02-15 | Stop reason: HOSPADM

## 2018-02-12 RX ORDER — DIPHENHYDRAMINE HCL 25 MG
25 TABLET ORAL EVERY 6 HOURS PRN
Status: DISCONTINUED | OUTPATIENT
Start: 2018-02-12 | End: 2018-02-15 | Stop reason: HOSPADM

## 2018-02-12 RX ADMIN — HYDROCODONE BITARTRATE AND ACETAMINOPHEN 1 TABLET: 5; 325 TABLET ORAL at 00:26

## 2018-02-12 RX ADMIN — Medication 1 TABLET: at 08:40

## 2018-02-12 RX ADMIN — HYDROCODONE BITARTRATE AND ACETAMINOPHEN 1 TABLET: 5; 325 TABLET ORAL at 04:44

## 2018-02-12 RX ADMIN — HYDROCODONE BITARTRATE AND ACETAMINOPHEN 1 TABLET: 10; 325 TABLET ORAL at 20:58

## 2018-02-12 RX ADMIN — IBUPROFEN 600 MG: 600 TABLET, FILM COATED ORAL at 16:47

## 2018-02-12 RX ADMIN — DOCUSATE SODIUM 100 MG: 100 CAPSULE ORAL at 21:50

## 2018-02-12 RX ADMIN — HYDROCODONE BITARTRATE AND ACETAMINOPHEN 1 TABLET: 5; 325 TABLET ORAL at 08:42

## 2018-02-12 RX ADMIN — HYDROCODONE BITARTRATE AND ACETAMINOPHEN 1 TABLET: 5; 325 TABLET ORAL at 16:48

## 2018-02-12 RX ADMIN — HYDROCODONE BITARTRATE AND ACETAMINOPHEN 1 TABLET: 5; 325 TABLET ORAL at 12:51

## 2018-02-12 ASSESSMENT — PAIN SCALES - GENERAL
PAINLEVEL_OUTOF10: 5
PAINLEVEL_OUTOF10: 4
PAINLEVEL_OUTOF10: 3
PAINLEVEL_OUTOF10: 6
PAINLEVEL_OUTOF10: 6
PAINLEVEL_OUTOF10: 4
PAINLEVEL_OUTOF10: 4
PAINLEVEL_OUTOF10: 7
PAINLEVEL_OUTOF10: 5
PAINLEVEL_OUTOF10: 6
PAINLEVEL_OUTOF10: 3

## 2018-02-12 ASSESSMENT — LIFESTYLE VARIABLES: DO YOU DRINK ALCOHOL: NO

## 2018-02-12 NOTE — CONSULTS
Lactation note:    Initial visit. Infant is 37 weeks, but does act younger and ballards at 36 weeks. Infant weight down 3% at around 12 hours of age. Discussed breastfeeding the LPI, and what to watch out for during feedings. Reviewed [facility] LPI handout.     Discussed normal course of breastfeeding and what to expect at 12-24-48-72 hours. Encouraged frequent skin to skin, and to continue offering breast every 2-3 hours.   Breastfeeding essentials pamphlet given, and reviewed.   Plan for tonight is to continue to offer breast first, to avoid feeds longer than 10 minutes, then to supplement according to appropriate guidelines, using 10-20-30 supplementation.    Parents very receptive to feeding plan.    Gave parents link for [place] breastfeeding, and encouraged to watch Maximizing Milk Supply.     MOB has no other questions or concerns regarding breastfeeding. Encouraged to call for assistance as needed. Lactation to follow in AM.

## 2018-02-12 NOTE — PROGRESS NOTES
"Received patient to room S-350 via gurney.  Report received from MELANI Shukla RN and assumed care of patient.  Nursing assessment done.  She denies pain.  She was oriented to room, call light, infant security, emergency light, skylight and unit routine.  Plan of care discussed.  Encouraged to call with any needs.  Two infant bands and cuddle system tag number checked for accuracy with \" Gayla\"MELANI RN when transferred to postpartum at this time. FOB at bedside and very supportive.  Pt denies any pain at this time.Sequentials on.  On clear liquid diet, advised to inform RN when passing flatus and pt verbalizes understanding.  "

## 2018-02-12 NOTE — PROGRESS NOTES
Assisted with breastfeeding attempt, reviewed hand expression, assessed flange fit and pump settings. Updated gold card feeding plan with RN. FOB will  rental HG pump from [facility] today. See lactation flow sheet on infant chart for more details.

## 2018-02-12 NOTE — CARE PLAN
Problem: Potential knowledge deficit related to lack of understanding of self and  care  Goal: Patient will verbalize understanding of self and infant care  Feeding plan explained to parents of infant, all questions answered at this time.    Problem: Pain Management  Goal: Pain level will decrease to patient's comfort goal  Pain controlled with prn medications per mar. Pt will call to request pain medications. Will offer pain medications as they become available.

## 2018-02-12 NOTE — PROGRESS NOTES
No c/o, Decreased vaginal bleeding, pain controlled  VSS AF  PE:  Abd apprpo mild TTP, no peritoneal signs, wound= C/D/I, no s/s erythema          Ext: No s/s DVT          Lung CTAB          CV RRR  Lab= Hb > 10  RH pos, RI    A: S/p c/s pod number one, doing well         P:  Continue post partum care

## 2018-02-12 NOTE — CARE PLAN
Problem: Knowledge Deficit  Goal: Knowledge of disease process/condition, treatment plan, diagnostic tests, and medications will improve  Outcome: PROGRESSING AS EXPECTED  Discussed plan of care for mother and for baby today w/ mother this am, mother is A+O, she verbalizes understanding of their plans of care, questions answered. Emotional support given. Will monitor for educational needs. Discussed pt's care with lactation today, lactation consultant in to see pt.     Problem: Pain Management  Goal: Pain level will decrease to patient's comfort goal  Outcome: PROGRESSING AS EXPECTED  Assessing every four hrs for pain per protocol; see doc flowsheets, receiving NORCO'S PO PRN today for pain control.

## 2018-02-13 PROCEDURE — 700112 HCHG RX REV CODE 229: Performed by: OBSTETRICS & GYNECOLOGY

## 2018-02-13 PROCEDURE — A9270 NON-COVERED ITEM OR SERVICE: HCPCS | Performed by: OBSTETRICS & GYNECOLOGY

## 2018-02-13 PROCEDURE — 700102 HCHG RX REV CODE 250 W/ 637 OVERRIDE(OP): Performed by: OBSTETRICS & GYNECOLOGY

## 2018-02-13 PROCEDURE — 770001 HCHG ROOM/CARE - MED/SURG/GYN PRIV*

## 2018-02-13 RX ADMIN — Medication 1 TABLET: at 08:51

## 2018-02-13 RX ADMIN — DOCUSATE SODIUM 100 MG: 100 CAPSULE ORAL at 08:52

## 2018-02-13 RX ADMIN — IBUPROFEN 600 MG: 600 TABLET, FILM COATED ORAL at 22:35

## 2018-02-13 RX ADMIN — SIMETHICONE CHEW TAB 80 MG 80 MG: 80 TABLET ORAL at 15:08

## 2018-02-13 RX ADMIN — HYDROCODONE BITARTRATE AND ACETAMINOPHEN 1 TABLET: 5; 325 TABLET ORAL at 08:54

## 2018-02-13 RX ADMIN — HYDROCODONE BITARTRATE AND ACETAMINOPHEN 1 TABLET: 10; 325 TABLET ORAL at 01:00

## 2018-02-13 RX ADMIN — SIMETHICONE CHEW TAB 80 MG 80 MG: 80 TABLET ORAL at 08:54

## 2018-02-13 RX ADMIN — IBUPROFEN 600 MG: 600 TABLET, FILM COATED ORAL at 08:51

## 2018-02-13 RX ADMIN — IBUPROFEN 600 MG: 600 TABLET, FILM COATED ORAL at 15:08

## 2018-02-13 ASSESSMENT — PAIN SCALES - GENERAL
PAINLEVEL_OUTOF10: 7
PAINLEVEL_OUTOF10: 4
PAINLEVEL_OUTOF10: 6
PAINLEVEL_OUTOF10: 3

## 2018-02-13 ASSESSMENT — LIFESTYLE VARIABLES: DO YOU DRINK ALCOHOL: NO

## 2018-02-13 NOTE — CARE PLAN
Problem: Urinary Elimination:  Goal: Ability to reestablish a normal urinary elimination pattern will improve  Pt able to urinate without any problem.    Problem: Pain Management  Goal: Pain level will decrease to patient's comfort goal  Pain controlled with prn medications per mar. Pt will call to request pain medications. Will offer pain medications as they become available.

## 2018-02-13 NOTE — PROGRESS NOTES
Pt started pumping Q 3 hrs LPI .pumping kit provided falange size 25.5mm, suction at 30%, speed 80. Patient educated about how to clean the kit.

## 2018-02-13 NOTE — PROGRESS NOTES
Follow up visit. Breastfeeding plan is still in place. MOB states she is still attempting to latch, and using breastpump, and supplementing with DBM.     2000- Parents are still receptive to feeding and supplementation. Infant weight down 7.2%, which is 4.2% more than yesterday, so now infant will have to be supplemented using mg/kg guidelines.     Encouraged to call for support as needed.

## 2018-02-13 NOTE — PROGRESS NOTES
Assessment complete. Fundus firm, lochia light. VSS. Tolerating diet. Voiding without difficulty. Incision Open to air. Pt states passing gas. Pain controlled with prn medications per mar. Will offer pain medications as they become available. FOB at bedside, bonding with pt/baby. POC discussed with pt. Encouraged to call with needs. Call light in place.

## 2018-02-13 NOTE — PROGRESS NOTES
No c/o, Decreased vaginal bleeding, pain controlled  VSS AF  PE:  Abd apprpo mild TTP, no peritoneal signs, wound= C/D/I, no s/s erythema          Ext: No s/s DVT            A: S/p c/s pod number 2, doing well         P:  Continue post partum care

## 2018-02-13 NOTE — PROGRESS NOTES
Patient medicated w/ Norco po prn and motrin po prn for pain control. Walked to & back from the NBN with FOB and infant today. Denies further needs. Will pass care to NOC RN @ [facility].

## 2018-02-14 VITALS
HEIGHT: 63 IN | DIASTOLIC BLOOD PRESSURE: 85 MMHG | HEART RATE: 72 BPM | BODY MASS INDEX: 30.65 KG/M2 | OXYGEN SATURATION: 96 % | TEMPERATURE: 98 F | RESPIRATION RATE: 16 BRPM | SYSTOLIC BLOOD PRESSURE: 120 MMHG | WEIGHT: 173 LBS

## 2018-02-14 PROCEDURE — A9270 NON-COVERED ITEM OR SERVICE: HCPCS | Performed by: OBSTETRICS & GYNECOLOGY

## 2018-02-14 PROCEDURE — 770001 HCHG ROOM/CARE - MED/SURG/GYN PRIV*

## 2018-02-14 PROCEDURE — 700112 HCHG RX REV CODE 229: Performed by: OBSTETRICS & GYNECOLOGY

## 2018-02-14 PROCEDURE — 700102 HCHG RX REV CODE 250 W/ 637 OVERRIDE(OP): Performed by: OBSTETRICS & GYNECOLOGY

## 2018-02-14 RX ADMIN — IBUPROFEN 600 MG: 600 TABLET, FILM COATED ORAL at 05:55

## 2018-02-14 RX ADMIN — IBUPROFEN 600 MG: 600 TABLET, FILM COATED ORAL at 11:55

## 2018-02-14 RX ADMIN — IBUPROFEN 600 MG: 600 TABLET, FILM COATED ORAL at 17:51

## 2018-02-14 RX ADMIN — DOCUSATE SODIUM 100 MG: 100 CAPSULE ORAL at 08:21

## 2018-02-14 RX ADMIN — Medication 1 TABLET: at 08:21

## 2018-02-14 RX ADMIN — IBUPROFEN 600 MG: 600 TABLET, FILM COATED ORAL at 23:58

## 2018-02-14 RX ADMIN — SIMETHICONE CHEW TAB 80 MG 80 MG: 80 TABLET ORAL at 08:21

## 2018-02-14 ASSESSMENT — PAIN SCALES - GENERAL
PAINLEVEL_OUTOF10: 3
PAINLEVEL_OUTOF10: 5
PAINLEVEL_OUTOF10: 1
PAINLEVEL_OUTOF10: 5
PAINLEVEL_OUTOF10: 3

## 2018-02-14 ASSESSMENT — LIFESTYLE VARIABLES: DO YOU DRINK ALCOHOL: NO

## 2018-02-14 NOTE — PROGRESS NOTES
No c/o, Decreased vaginal bleeding, pain controlled  VSS AF  PE:  Abd apprpo mild TTP, no peritoneal signs, wound= C/D/I, no s/s erythema          Ext: No s/s DVT            A: S/p c/s pod number 3, doing well         P:  Patient would like to stay for now, continue post partum care

## 2018-02-14 NOTE — PROGRESS NOTES
Pt assessment complete, wnl. Fundus firm at umbilicus with minimal discharge.  Pt ambulating and voiding without difficulty. Bonding well with infant, assisted pt with breast feeding and pumping. Plan of care discussed with patient for the day. Questions answered. Encouraged to call with needs, will monitor.

## 2018-02-14 NOTE — CARE PLAN
Problem: Pain Management  Goal: Pain level will decrease to patient's comfort goal  Pt pain at a comfortable level, will continue to monitor and medicate per MAR

## 2018-02-14 NOTE — PROGRESS NOTES
A bedside report received from Jagruti JACKSON @ the change of shift.  Assumed care.     @ 0830: Discussed plan of care especially on managing pain. Established a feeding plan for baby ( breast attempt, supplement and to use the breast pump every 3 hours) Patient and FOB agreed to the plan. Discussed to patient and FOB why it is important to the baby to be fed following the feeding guidelines for LPI. They both happy with the plan. Dr Gray spoke with the patient and told them that infant will be placed to the isolette for the phototherapy. Educate them of what to expect once the phototherapy started. Asked patient if she would like to speak with lactation but she said that she will let me know.    @ 0920: Walked with patient and FOB to the nursery with the baby.      @ 1000: Assessment done. Encouraged to call if with needs. Will check at intervals.

## 2018-02-14 NOTE — CARE PLAN
Problem: Potential for postpartum infection related to surgical incision, compromised uterine condition, urinary tract or respiratory compromise  Goal: Patient will be afebrile and free from signs and symptoms of infection  Outcome: PROGRESSING AS EXPECTED  Will encourage ambulation.     Problem: Alteration in comfort related to surgical incision and/or after birth pains  Goal: Patient is able to ambulate, care for self and infant with acceptable pain level  Outcome: PROGRESSING AS EXPECTED  Will medicate for pain as needed.

## 2018-02-14 NOTE — CONSULTS
Progression to breastfeeding discussed with mother. Outlined the supportive measures that should be in place for now, to include skin to skin and other basic strategies, hand expression and intrinsic factors (smell, touch, sight and visualization). Encouraged parents to wake baby every few hours and stimulate her to provide opportunities to learn, explore and practice techniques. Plan for this couplet: Pump every 2-3 hours to stimulate milk production (with Hospital Grade Pump). Hand Expression reviewed and encouraged to practice technique.Feed baby any colostrum that is expressed.Parents may choose to deviate from supplement guidelines. Use donor milk or formula per parent preference as needed to augment amount of supplement if mothers own milk an inadequate amount. Mother may choose to continue latching practice in between pumping, hand expressing and bottle supplementing. She may also choose not to give supplements.IBCLC to follow up tomorrow.

## 2018-02-14 NOTE — CARE PLAN
Problem: Alteration in comfort related to surgical incision and/or after birth pains  Goal: Patient verbalizes acceptable pain level  Outcome: PROGRESSING AS EXPECTED  Patient states pain is tolerable with pain medication.  Will continue to reassess with hourly rounding and medicate accordingly to 0-10 pain scale.    Problem: Potential anxiety related to difficulty adapting to parental role  Goal: Patient will verbalize and demonstrate effective bonding and parenting behavior  Outcome: PROGRESSING AS EXPECTED  Patient is bonding well with baby, recognizing cues, and responding appropriately.

## 2018-02-14 NOTE — CARE PLAN
Problem: Potential anxiety related to difficulty adapting to parental role  Goal: Patient will verbalize and demonstrate effective bonding and parenting behavior  MOB emotional about breast feeding and not been able to provide the amount of milk the infant is consuming. Worrying about the need to supplement with formula when they go home.

## 2018-02-15 PROCEDURE — A9270 NON-COVERED ITEM OR SERVICE: HCPCS | Performed by: OBSTETRICS & GYNECOLOGY

## 2018-02-15 PROCEDURE — 700102 HCHG RX REV CODE 250 W/ 637 OVERRIDE(OP): Performed by: OBSTETRICS & GYNECOLOGY

## 2018-02-15 PROCEDURE — 700112 HCHG RX REV CODE 229: Performed by: OBSTETRICS & GYNECOLOGY

## 2018-02-15 RX ORDER — IBUPROFEN 600 MG/1
600 TABLET ORAL EVERY 6 HOURS PRN
Qty: 30 TAB | Refills: 1 | Status: ON HOLD | OUTPATIENT
Start: 2018-02-15 | End: 2020-06-09

## 2018-02-15 RX ORDER — HYDROCODONE BITARTRATE AND ACETAMINOPHEN 5; 325 MG/1; MG/1
1-2 TABLET ORAL EVERY 6 HOURS PRN
Qty: 15 TAB | Refills: 0 | Status: SHIPPED | OUTPATIENT
Start: 2018-02-15 | End: 2018-02-22

## 2018-02-15 RX ADMIN — DOCUSATE SODIUM 100 MG: 100 CAPSULE ORAL at 07:49

## 2018-02-15 RX ADMIN — Medication 1 TABLET: at 07:48

## 2018-02-15 RX ADMIN — IBUPROFEN 600 MG: 600 TABLET, FILM COATED ORAL at 07:48

## 2018-02-15 ASSESSMENT — PAIN SCALES - GENERAL
PAINLEVEL_OUTOF10: 4
PAINLEVEL_OUTOF10: 5

## 2018-02-15 NOTE — CARE PLAN
Problem: Potential for postpartum infection related to surgical incision, compromised uterine condition, urinary tract or respiratory compromise  Goal: Patient will be afebrile and free from signs and symptoms of infection  VSS. Incision well approximated, clean and dry.     Problem: Pain Management  Goal: Pain level will decrease to patient's comfort goal  Pain controlled with prn medications per mar. Pt will call to request pain medications. Will offer pain medications as they become available.

## 2018-02-15 NOTE — PROGRESS NOTES
Assessment complete. Fundus firm, lochia scant. VSS. Tolerating diet. Voiding without difficulty. Incision open to air, well approximated. Pt states passing gas. Pain controlled with prn medications per mar. Will offer pain medications as they become available. FOB at bedside, bonding with pt. Infant at NBN for phototherapy  POC discussed with pt. Encouraged to call with needs. Call light in place.

## 2018-02-15 NOTE — PROGRESS NOTES
0950-Discharge education discussed with patient.  Patient verbalized understanding.  Prescriptions handed to patient.  Patient verbalized understanding of follow up appointments for herself and infant.

## 2018-02-15 NOTE — DISCHARGE INSTRUCTIONS
POSTPARTUM DISCHARGE INSTRUCTIONS FOR MOM    YOB: 1985   Age: 32 y.o.               Admit Date: 2018     Discharge Date: 2/15/2018  Attending Doctor:  Kannan Castillo M.D.                  Allergies:  Percocet [oxycodone-acetaminophen]    Discharged to home by car. Discharged via wheelchair, hospital escort: Yes.  Special equipment needed: Not Applicable  Belongings with: Personal  Be sure to schedule a follow-up appointment with your primary care doctor or any specialists as instructed.     Discharge Plan:   Diet Plan: Discussed  Activity Level: Discussed  Confirmed Follow up Appointment: Patient to Call and Schedule Appointment  Confirmed Symptoms Management: Discussed  Medication Reconciliation Updated: Yes  Influenza Vaccine Indication: Patient Refuses    REASONS TO CALL YOUR OBSTETRICIAN:  1.   Persistent fever or shaking chills (Temperature higher than 100.4)  2.   Heavy bleeding (soaking more than 1 pad per hour); Passing clots  3.   Foul odor from vagina  4.   Mastitis (Breast infection; breast pain, chills, fever, redness)  5.   Urinary pain, burning or frequency  6.   Abdominal incision infection  7.   Severe depression longer than 24 hours    HAND WASHING  · Prior to handling the baby.  · Before breastfeeding or bottle feeding baby.  · After using the bathroom or changing the baby's diaper.    WOUND CARE  Ask your physician for additional care instructions.  In general:    ·  Incision:      · Keep clean and dry.    · Do NOT lift anything heavier than your baby for up to 6 weeks.    · There should not be any opening or pus.      VAGINAL CARE  · Nothing inside vagina for 6 weeks: no sexual intercourse, tampons or douching.  · Bleeding may continue for 2-4 weeks.  Amount may vary.    · Call your physician for heavy bleeding which means soaking more than 1 pad per hour    BIRTH CONTROL  · It is possible to become pregnant at any time after delivery and while breastfeeding.  · Plan to  "discuss a method of birth control with your physician at your follow up visit. visit.    DIET AND ELIMINATION  · Eating more fiber (bran cereal, fruits, and vegetables) and drinking plenty of fluids will help to avoid constipation.  · Urinary frequency after childbirth is normal.    POSTPARTUM BLUES  During the first few days after birth, you may experience a sense of the \"blues\" which may include impatience, irritability or even crying.  These feeling come and go quickly.  However, as many as 1 in 10 women experience emotional symptoms known as postpartum depression.    Postpartum depression:  May start as early as the second or third day after delivery or take several weeks or months to develop.  Symptoms of \"blues\" are present, but are more intense:  Crying spells; loss of appetite; feelings of hopelessness or loss of control; fear of touching the baby; over concern or no concern at all about the baby; little or no concern about your own appearance/caring for yourself; and/or inability to sleep or excessive sleeping.  Contact your physician if you are experiencing any of these symptoms.    Crisis Hotline:  · [place] Crisis Hotline:  5-069-CKODVPV  Or 1-616.165.6916  · [place] Crisis Hotline:  1-261.908.3598  Or 443-912-2083    PREVENTING SHAKEN BABY:  If you are angry or stressed, PUT THE BABY IN THE CRIB, step away, take some deep breaths, and wait until you are calm to care for the baby.  DO NOT SHAKE THE BABY.  You are not alone, call a supporter for help.    · Crisis Call Center 24/7 crisis line 590-267-3687 or 1-835.493.4790  · You can also text them, text \"ANSWER\" to 532954    QUIT SMOKING/TOBACCO USE:  I understand the use of any tobacco products increases my chance of suffering from future heart disease and could cause other illnesses which may shorten my life. Quitting the use of tobacco products is the single most important thing I can do to improve my health. For further information on smoking / " tobacco cessation call a Toll Free Quit Line at 1-850.208.9155 (*[employer] [place]) or 1-478.764.2720 (American Lung Association) or you can access the web based program at www.lungusa.org.    · [place] Tobacco Users Help Line:  (240) 533-2976       Toll Free: 1-647.299.7834  · Quit Tobacco Program [facility] (561)948-5889    DEPRESSION / SUICIDE RISK:  As you are discharged from this [facility], it is important to learn how to keep safe from harming yourself.    Recognize the warning signs:  · Abrupt changes in personality, positive or negative- including increase in energy   · Giving away possessions  · Change in eating patterns- significant weight changes-  positive or negative  · Change in sleeping patterns- unable to sleep or sleeping all the time   · Unwillingness or inability to communicate  · Depression  · Unusual sadness, discouragement and loneliness  · Talk of wanting to die  · Neglect of personal appearance   · Rebelliousness- reckless behavior  · Withdrawal from people/activities they love  · Confusion- inability to concentrate     If you or a loved one observes any of these behaviors or has concerns about self-harm, here's what you can do:  · Talk about it- your feelings and reasons for harming yourself  · Remove any means that you might use to hurt yourself (examples: pills, rope, extension cords, firearm)  · Get professional help from the community (Mental Health, Substance Abuse, psychological counseling)  · Do not be alone:Call your Safe Contact- someone whom you trust who will be there for you.  · Call your local CRISIS HOTLINE 153-5084 or 126-218-4830  · Call your local Children's Mobile Crisis Response Team [place] (115) 689-1406 or www.Goodoc  · Call the toll free National Suicide Prevention Hotlines   · National Suicide Prevention Lifeline 246-163-PFEZ (0722)  · National Hope Line Network 800-SUICIDE (737-7070)    DISCHARGE  SURVEY:  Thank you for choosing [facility].  We hope we provided you with very good care.  You may be receiving a survey in the mail.  Please fill it out.  Your opinion is valuable to us.    ADDITIONAL EDUCATIONAL MATERIALS GIVEN TO PATIENT:        My signature on this form indicates that:  1.  I have reviewed and understand the above information  2.  My questions regarding this information have been answered to my satisfaction.  3.  I have formulated a plan with my discharge nurse to obtain my prescribed medication for home.

## 2018-02-15 NOTE — PROGRESS NOTES
No c/o, Decreased vaginal bleeding, pain controlled, wants d/c  VSS AF  PE:  Abd apprpo mild TTP, no peritoneal signs, wound= C/D/I, no s/s erythema          Ext: No s/s DVT          A: S/p c/s pod number 4, doing well         P:  Plan d/c home  Follow up immediately office or if closed emergency room with fever >100 degrees, severe pain, intractable nausea or vomiting, syncope or dizziness, vaginal bleeding greater than a period, problem with wound, any concerns.  Pelvic rest for 6 weeks, no driving on narcotic pain medication, no lifting greater than 10 pounds.

## 2018-02-15 NOTE — PROGRESS NOTES
"Breast massage & hand expression, able to express colostrum easily, discussed may use warm compress before feed, breasts augmented with implants. Assisted baby to left breast using football hold, skin to skin, baby fussy initially, baby latched with a few fair sucks, then fell asleep. Mother has been supplementing (donor) & pumping after breastfeeding. Mother has [facility] and picked-up  HG pump from [facility], appointment for 1:1 consult made for Monday @ 1045. Pump settings reviewed speed 80 (decrease to 50-60 after 2-3 minutes), suction 30% (to comfort) x 10-15 minutes may handexpress after pumping for 5 minutes on each breast. Breastfeeding plan, breastfeed, supplement using \"supplemental guidelines\" then pump every 2-3 hours.   "

## 2018-02-15 NOTE — DISCHARGE SUMMARY
DATE OF ADMISSION:  2018    DATE OF DISCHARGE:  02/15/2018    ADMISSION DIAGNOSES:  1.  Intrauterine pregnancy at 37 weeks.  2.  Gestational hypertension.  3.  Borderline oligohydramnios.  4.  Breech presentation.  5.  Suspected intrauterine growth restriction.    DISCHARGE DIAGNOSIS:  Status post primary low transverse  section,   postoperative day #4.    HOSPITAL COURSE:  Patient was admitted for the above diagnoses.   was   undertaken without difficulty.  Her postpartum course was uncomplicated.  On   day of discharge, she was tolerating a regular diet, ambulating without   difficulty, her vital signs stable and within normal limits, physical exam was   within normal limits, she desired to be discharged home.    DISCHARGE MEDICATIONS:  Motrin and Norco.    DISCHARGE INSTRUCTIONS:  Patient will follow up with fever greater or equal to   100 degrees, severe abdominal pain, intractable nausea, vomiting, syncope,   dizziness, vaginal bleeding greater than a period, problem with incisions,   signs or symptoms of preeclampsia or any other concerns.  She will have pelvic   rest for 6 weeks.  No driving for 3 days or while on narcotic medication.  No   lifting greater than 10 pounds for 6 weeks.  Follow up appointment in 1-2   weeks.       ____________________________________     MD FARIBA ESTRADA / NTS    DD:  02/15/2018 06:27:00  DT:  02/15/2018 06:36:27    D#:  [id number]  Job#:  [id number]    cc: [facility]

## 2018-02-15 NOTE — PROGRESS NOTES
An update received from Jagruti JACKSON @ the change of shift.  Assumed care. Discussed plan of care especially on managing pain. Assessment done. Medicated for pain. Encouraged to call if with needs. Will check at intervals.

## 2018-02-20 NOTE — PROGRESS NOTES
DATE OF SERVICE:  2018    DATE OF SERVICE:  2018    NOTE:  This is a patient of mine who had elevated blood pressures in the   office.  She came to the hospital for preeclamptic evaluation.  She had normal   blood pressures here.  Her labs were normal.  She had a category 1 reactive   NST without signs of labor.  She will be sent home with a diagnosis of   gestational hypertension and  precautions.       ____________________________________     MD FARIBA ESTRADA / NTS    DD:  2018 16:42:00  DT:  2018 20:20:29    D#:  [id number]  Job#:  [id number]    cc: [facility]

## 2018-03-29 ENCOUNTER — HOSPITAL ENCOUNTER (OUTPATIENT)
Dept: LAB | Facility: MEDICAL CENTER | Age: 33
End: 2018-03-29
Attending: OBSTETRICS & GYNECOLOGY
Payer: COMMERCIAL

## 2018-03-29 PROCEDURE — 87086 URINE CULTURE/COLONY COUNT: CPT

## 2018-04-01 LAB
BACTERIA UR CULT: NORMAL
SIGNIFICANT IND 70042: NORMAL
SITE SITE: NORMAL
SOURCE SOURCE: NORMAL

## 2018-05-03 ENCOUNTER — HOSPITAL ENCOUNTER (OUTPATIENT)
Dept: LAB | Facility: MEDICAL CENTER | Age: 33
End: 2018-05-03
Attending: DERMATOLOGY
Payer: COMMERCIAL

## 2018-05-03 PROCEDURE — 88305 TISSUE EXAM BY PATHOLOGIST: CPT

## 2018-05-08 LAB — PATHOLOGY CONSULT NOTE: NORMAL

## 2018-12-26 ENCOUNTER — OFFICE VISIT (OUTPATIENT)
Dept: URGENT CARE | Facility: CLINIC | Age: 33
End: 2018-12-26
Payer: COMMERCIAL

## 2018-12-26 VITALS
WEIGHT: 140 LBS | BODY MASS INDEX: 24.8 KG/M2 | RESPIRATION RATE: 16 BRPM | HEIGHT: 63 IN | DIASTOLIC BLOOD PRESSURE: 70 MMHG | HEART RATE: 99 BPM | TEMPERATURE: 98.3 F | SYSTOLIC BLOOD PRESSURE: 122 MMHG | OXYGEN SATURATION: 96 %

## 2018-12-26 DIAGNOSIS — J01.00 ACUTE NON-RECURRENT MAXILLARY SINUSITIS: ICD-10-CM

## 2018-12-26 DIAGNOSIS — J06.9 UPPER RESPIRATORY TRACT INFECTION, UNSPECIFIED TYPE: ICD-10-CM

## 2018-12-26 PROCEDURE — 99214 OFFICE O/P EST MOD 30 MIN: CPT | Performed by: FAMILY MEDICINE

## 2018-12-26 RX ORDER — AMOXICILLIN AND CLAVULANATE POTASSIUM 875; 125 MG/1; MG/1
1 TABLET, FILM COATED ORAL 2 TIMES DAILY
Qty: 20 TAB | Refills: 0 | Status: SHIPPED | OUTPATIENT
Start: 2018-12-26 | End: 2019-01-05

## 2018-12-26 ASSESSMENT — ENCOUNTER SYMPTOMS
NAUSEA: 0
SINUS PAIN: 1
DIZZINESS: 0
FLANK PAIN: 0
COUGH: 1
CHILLS: 1
SPUTUM PRODUCTION: 0
SWOLLEN GLANDS: 0
HEADACHES: 0
ABDOMINAL PAIN: 0
MYALGIAS: 0
SHORTNESS OF BREATH: 0
DIARRHEA: 0
FEVER: 0
CONSTIPATION: 0
EYE DISCHARGE: 0
SORE THROAT: 1
VOMITING: 0
EYE PAIN: 0

## 2018-12-26 NOTE — PROGRESS NOTES
Subjective:   Tamiko Hilton is a 33 y.o. female who presents for Nasal Congestion (x2 weeks); Cough (x2 weeks, green mucus); Otalgia (x2 weeks); Pharyngitis (x2 weeks); and Pressure Behind the Eyes (x2 weeks, HX of sinus infections)       URI    This is a new problem. Episode onset: 2 weeks ago. The problem has been gradually worsening. There has been no fever. Associated symptoms include congestion, coughing, ear pain, sinus pain and a sore throat. Pertinent negatives include no abdominal pain, chest pain, diarrhea, dysuria, headaches, joint pain, nausea, rash, swollen glands or vomiting. She has tried NSAIDs for the symptoms. The treatment provided mild relief.     Review of Systems   Constitutional: Positive for chills. Negative for fever.   HENT: Positive for congestion, ear pain, sinus pain and sore throat. Negative for ear discharge.    Eyes: Negative for pain and discharge.   Respiratory: Positive for cough. Negative for sputum production and shortness of breath.    Cardiovascular: Negative for chest pain.   Gastrointestinal: Negative for abdominal pain, constipation, diarrhea, nausea and vomiting.   Genitourinary: Negative for dysuria, flank pain, frequency and urgency.   Musculoskeletal: Negative for joint pain and myalgias.   Skin: Negative for rash.   Neurological: Negative for dizziness and headaches.   All other systems reviewed and are negative.      PMH:  has a past medical history of Bowel habit changes; Heart burn; and Indigestion. She also has no past medical history of Hemorrhagic disorder (HCC).    MEDS:   Current Outpatient Prescriptions:   •  lidocaine viscous 2% (XYLOCAINE) 2 % Solution, Take 15 mL by mouth every 6 hours as needed for Throat/Mouth Pain., Disp: 300 mL, Rfl: 0  •  amoxicillin-clavulanate (AUGMENTIN) 875-125 MG Tab, Take 1 Tab by mouth 2 times a day for 10 days., Disp: 20 Tab, Rfl: 0  •  ibuprofen (MOTRIN) 600 MG Tab, Take 1 Tab by mouth every 6 hours as needed (pain)., Disp:  "30 Tab, Rfl: 1  •  Prenatal Multivit-Min-Fe-FA (PRE- FORMULA) Tab, Take  by mouth every day., Disp: , Rfl:   •  EVENING PRIMROSE OIL PO, Take  by mouth every day., Disp: , Rfl:   •  Docosahexaenoic Acid (DHA COMPLETE PO), Take  by mouth every day., Disp: , Rfl:   •  raNITidine (ZANTAC) 150 MG Tab, Take 150 mg by mouth as needed for Heartburn., Disp: , Rfl:     ALLERGIES:   Allergies   Allergen Reactions   • Percocet [Oxycodone-Acetaminophen] Itching       SURGHX:   Past Surgical History:   Procedure Laterality Date   • PRIMARY C SECTION N/A 2018    Procedure: PRIMARY C SECTION;  Surgeon: Kannan Castillo M.D.;  Location: LABOR AND DELIVERY;  Service: Labor and Delivery   • DILATION AND EVACUATION  2017    Procedure: DILATION AND EVACUATION;  Surgeon: Kannan Castillo M.D.;  Location[facility] SUR[facility]ERY [facility];  Service:    • OTHER  10/2014    Breast augmentation       SOCHX:  reports that she quit smoking about 2 years ago. She has a 5.00 pack-year smoking history. She has never used smokeless tobacco. She reports that she does not drink alcohol or use drugs.    FH: Reviewed with patient, not pertinent to this visit.     Objective:   /70   Pulse 99   Temp 36.8 °C (98.3 °F)   Resp 16   Ht 1.6 m (5' 3\")   Wt 63.5 kg (140 lb)   SpO2 96%   BMI 24.80 kg/m²   Physical Exam   Constitutional: She is oriented to person, place, and time. She appears well-developed and well-nourished. No distress.   HENT:   Head: Normocephalic and atraumatic.   Right Ear: Tympanic membrane, external ear and ear canal normal.   Left Ear: Tympanic membrane, external ear and ear canal normal.   Nose: Rhinorrhea present. Right sinus exhibits maxillary sinus tenderness. Left sinus exhibits maxillary sinus tenderness.   Mouth/Throat: Uvula is midline and mucous membranes are normal. Posterior oropharyngeal erythema present. No oropharyngeal exudate or posterior oropharyngeal edema.   Eyes: Conjunctivae and EOM are " normal.   Neck: Neck supple. No tracheal deviation present.   Cardiovascular: Normal rate, regular rhythm and normal heart sounds.    Pulmonary/Chest: Effort normal and breath sounds normal. No respiratory distress.   Lymphadenopathy:     She has no cervical adenopathy.   Neurological: She is alert and oriented to person, place, and time.   Skin: Skin is warm and dry.   Psychiatric: She has a normal mood and affect. Her behavior is normal. Judgment and thought content normal.       Assessment/Plan:   1. Acute non-recurrent maxillary sinusitis  - amoxicillin-clavulanate (AUGMENTIN) 875-125 MG Tab; Take 1 Tab by mouth 2 times a day for 10 days.  Dispense: 20 Tab; Refill: 0    2. Upper respiratory tract infection, unspecified type  - lidocaine viscous 2% (XYLOCAINE) 2 % Solution; Take 15 mL by mouth every 6 hours as needed for Throat/Mouth Pain.  Dispense: 300 mL; Refill: 0  - Advised to try OTC decongestant, OTC Flonase, OTC Ibuprofen/Acetaminophen for symptom relief  - Advised to rest, stay hydrated  - Advised to return if symptoms worsen or do not improve    Differential diagnosis, natural history, supportive care, and indications for immediate follow-up discussed.    Patient's case was reviewed and discussed with Dr. Austin during Salbador BRIAN's training period.

## 2019-11-23 NOTE — PROGRESS NOTES
Consultation - Cardiology Team One  Marine Green 55 y o  male MRN: [id number]  Unit/Bed#: -01 Encounter: [id number]    Inpatient consult to Cardiology  Consult performed by: RICKEY Bales  Consult ordered by: Terry Viera MD          Physician Requesting Consult: Tristin Harris MD  Reason for Consult / Principal Problem:  Chest pain      Assessment/Plan:    1  Chest pain  -right-sided chest pain  -troponins less than 0 02 x 3  -given patient's history and risk factors, exercise stress echocardiogram to be performed tomorrow  -continue aspirin, statin and Plavix  -no beta-blocker secondary to intolerance due to bradycardia, cannot tolerate Imdur either secondary to feeling sluggish and short of breath  -telemetry monitor  -cardiac diet    2  CAD S/P KARLA x2 in 04/2019  -continue aspirin, statin and Plavix  -no beta-blocker secondary to intolerance due to bradycardia, cannot tolerate Imdur either secondary to feeling sluggish and short of breath  -telemetry monitor  -cardiac diet    3  Hypertension, currently controlled  -continue to monitor blood pressures    4  Hyperlipidemia  -continue statin      HPI: Cardiologist Dr Nicanor Asif is a 55y o  year old male who has a history of CAD S/P KARLA x2 in April 2019 (KARLA with balloon angioplasty to 70% lesion in the proximal RCA, KARLA to 70% lesion in the distal RCA), hypertension, hyperlipidemia, and microscopic colitis who presents to the emergency room yesterday with complaints of right-sided chest pain when he was sitting at his computer  He states the pain is similar to his last episode of chest pain that required stenting  Patient took 2 nitroglycerin and had no relief, therefore he called his cardiologist and was advised to come to the emergency room for further evaluation  He denies any associated shortness of breath, chills, fever, recent viral illness    Troponins less than 0 02 x 3, chest x-ray also negative for any acute Just talked to parents regarding feeding plan. Tonight infant lost more weight, total weight lost for this stay is 8%.  They are disappointed that they will have to feed/supplement with formula when they are discharged because we were following ml/kg guidelines (lvl 2) due to infant losing more weight. They expressed frustration that information they've been told is not consistent from two LCs, and their pediatrician. Attempted to give explanation regarding supplementation with LPIs, and is a plan to bridge/augment mother's own milk supply with donor breastmilk or formula, until mother's own milk supply is fully established.      This LC provided and educated parents since 18 of late  infant and feeding expectations. Also told them that we would support both MOB and infant during their stay here.     Per previous LC note, parents may choose to deviate from supplementation guidelines.      Discussed discharge feeding plan. Plan is to   1. Attempt to latch and breastfeed every 2-3 hours.   2. If latch or breastfeeding is suboptimal, then may supplement.   3. Pump to keep up stimulation/supply.     Encouraged parents to speak with their pediatrician regarding recommended volumes to feed their baby.      cardiopulmonary findings  REVIEW OF SYSTEMS:  Constitutional:  Denies fever or chills   Eyes:  Denies change in visual acuity   HENT:  Denies nasal congestion or sore throat   Respiratory:  Denies cough or shortness of breath   Cardiovascular:  + chest pain, denies edema   GI:  Denies abdominal pain, nausea, vomiting, bloody stools or diarrhea   :  Denies dysuria, frequency, difficulty in micturition and nocturia  Musculoskeletal:  Denies back pain or joint pain   Neurologic:  Denies headache, focal weakness or sensory changes   Endocrine:  Denies polyuria or polydipsia   Lymphatic:  Denies swollen glands   Psychiatric:  Denies depression or anxiety     Historical Information   Past Medical History:   Diagnosis Date    Diaz's esophagus     Colon polyp     Coronary artery disease     Depression     Diverticulitis     GERD (gastroesophageal reflux disease)     Hemorrhoid     History of heart artery stent     Hyperlipidemia     Hypertension     Microscopic colitis      Past Surgical History:   Procedure Laterality Date    ABDOMINAL SURGERY      radio frequency ablation    BONE GRAFT Left 2018    CARPAL TUNNEL RELEASE Right     COLONOSCOPY      EGD AND COLONOSCOPY      ESOPHAGOGASTRODUODENOSCOPY N/A 2/15/2018    Procedure: ESOPHAGOGASTRODUODENOSCOPY (EGD); Surgeon: Desiree Talamantes MD;  Location: [facility];  Service: Gastroenterology    ESOPHAGOGASTRODUODENOSCOPY N/A 12/10/2018    Procedure: ESOPHAGOGASTRODUODENOSCOPY (EGD); Surgeon: Sasha Lindsey MD;  Location: [facility];   Service: Gastroenterology    TONSILLECTOMY       Social History     Substance and Sexual Activity   Alcohol Use Never    Frequency: Never    Comment: quit 3 years     Social History     Substance and Sexual Activity   Drug Use Yes    Frequency: 3 0 times per week    Types: Marijuana    Comment: last used 9/29     Social History     Tobacco Use   Smoking Status Former Smoker    Last attempt to quit: 2007    Years since quittin 8   Smokeless Tobacco Former User    Quit date: 1998       Family History:   Family History   Problem Relation Age of Onset    Heart disease Father     Cancer Sister        MEDS & ALLERGIES:  all current active meds have been reviewed and current meds:   Current Facility-Administered Medications   Medication Dose Route Frequency    acetaminophen (TYLENOL) tablet 650 mg  650 mg Oral Q6H PRN    aspirin (ECOTRIN LOW STRENGTH) EC tablet 324 mg  324 mg Oral Daily    atorvastatin (LIPITOR) tablet 40 mg  40 mg Oral Daily    budesonide (ENTOCORT EC) capsule 9 mg  9 mg Oral Daily    buPROPion (WELLBUTRIN) tablet 75 mg  75 mg Oral Daily    busPIRone (BUSPAR) tablet 10 mg  10 mg Oral Daily    clonazePAM (KlonoPIN) tablet 1 5 mg  1 5 mg Oral HS    clopidogrel (PLAVIX) tablet 75 mg  75 mg Oral Daily    dicyclomine (BENTYL) tablet 20 mg  20 mg Oral Q6H    enoxaparin (LOVENOX) subcutaneous injection 40 mg  40 mg Subcutaneous Daily    morphine injection 2 mg  2 mg Intravenous Q4H PRN    nitroglycerin (NITROSTAT) SL tablet 0 4 mg  0 4 mg Sublingual Q5 Min PRN    ondansetron (ZOFRAN) injection 4 mg  4 mg Intravenous Q6H PRN    pantoprazole (PROTONIX) EC tablet 40 mg  40 mg Oral Early Morning    sertraline (ZOLOFT) tablet 200 mg  200 mg Oral Daily        Allergies   Allergen Reactions    No Active Allergies        OBJECTIVE:  Vitals:   Vitals:    19 0656   BP: 126/73   Pulse:    Resp: 16   Temp: 97 8 °F (36 6 °C)   SpO2:      Body mass index is 33 8 kg/m²      Systolic (63HMK), HNJ:839 , Min:110 , CTU:040     Diastolic (95DNK), PSM:55, Min:60, Max:100      Intake/Output Summary (Last 24 hours) at 2019 1033  Last data filed at 2019 0900  Gross per 24 hour   Intake 240 ml   Output --   Net 240 ml     Weight (last 2 days)     Date/Time   Weight    19   95 (209 44)    19 1900   95 (209 44)            Invasive Devices     Peripheral Intravenous Line            Peripheral IV 11/22/19 Right Forearm less than 1 day                PHYSICAL EXAMS:  General:  Patient is not in acute distress, laying in the bed comfortably, awake, alert responding to commands  Head: Normocephalic, Atraumatic     HEENT: White sclera, pink conjunctiva  Neck:  Supple, no neck vein distention  Respiratory: clear to P/A  Cardiovascular:  PMI normal, S1-S2 normal, No  Murmurs, thrills, gallops, rubs, regular rhythm  GI:  Abdomen soft, non-tender, non-distended  Extremities: No edema, normal pulses  Integument:  No skin rashes or ulceration  Lymphatic:  No cervical or inguinal lymphadenopathy  Neurologic:  Patient is awake alert, responding to command, oriented to person, place and time     LABORATORY RESULTS:  Results from last 7 days   Lab Units 11/23/19 0452 11/22/19 2107 11/22/19  1549   TROPONIN I ng/mL <0 02 <0 02 <0 02     CBC with diff:   Results from last 7 days   Lab Units 11/23/19 0452 11/22/19 2107 11/22/19  1549   WBC Thousand/uL 6 44  --  13 36*   HEMOGLOBIN g/dL 13 3  --  13 6   HEMATOCRIT % 41 1  --  41 3   MCV fL 90  --  89   PLATELETS Thousands/uL 268 258 311   MCH pg 29 1  --  29 3   MCHC g/dL 32 4  --  32 9   RDW % 12 9  --  12 9   MPV fL 9 6 9 7 9 6   NRBC AUTO /100 WBCs 0  --  0       CMP:  Results from last 7 days   Lab Units 11/23/19 0452 11/22/19  1549   POTASSIUM mmol/L 3 9 3 5   CHLORIDE mmol/L 108 106   CO2 mmol/L 25 24   BUN mg/dL 10 12   CREATININE mg/dL 0 88 0 93   CALCIUM mg/dL 8 8 9 0   AST U/L  --  12   ALT U/L  --  20   ALK PHOS U/L  --  59   EGFR ml/min/1 73sq m 97 92       BMP:  Results from last 7 days   Lab Units 11/23/19 0452 11/22/19  1549   POTASSIUM mmol/L 3 9 3 5   CHLORIDE mmol/L 108 106   CO2 mmol/L 25 24   BUN mg/dL 10 12   CREATININE mg/dL 0 88 0 93   CALCIUM mg/dL 8 8 9 0                              Lipid Profile:   No results found for: CHOL  Lab Results   Component Value Date    HDL 51 11/23/2019    HDL 64 (H) 02/20/2019     Lab Results   Component Value Date    LDLCALC 88 11/23/2019    LDLCALC 191 (H) 02/20/2019     Lab Results   Component Value Date    TRIG 94 11/23/2019    TRIG 158 (H) 02/20/2019       Cardiac testing:   No results found for this or any previous visit  No results found for this or any previous visit  No procedure found  No results found for this or any previous visit  Imaging:   I have personally reviewed pertinent reports  EKG reviewed personally:  None currently available for review    Telemetry reviewed personally:   Sinus rhythm with a rate in the 70s, episodes of bradycardia overnight with rates in the 40s to 50s, no pauses noted      Code Status: Level 1 - Full Code    Counseling / Coordination of Care  Total floor / unit time spent today 15 minutes  Greater than 50% of total time was spent with the patient and / or family counseling and / or coordination of care  A description of the counseling / coordination of care: Review of history, current assessment, development of a plan      [address] [place] [address]  11/23/2019,10:33 AM

## 2019-12-13 ENCOUNTER — HOSPITAL ENCOUNTER (OUTPATIENT)
Facility: MEDICAL CENTER | Age: 34
End: 2019-12-13
Attending: OBSTETRICS & GYNECOLOGY
Payer: COMMERCIAL

## 2019-12-13 LAB
ABO GROUP BLD: NORMAL
BASOPHILS # BLD AUTO: 0.2 % (ref 0–1.8)
BASOPHILS # BLD: 0.01 K/UL (ref 0–0.12)
BLD GP AB SCN SERPL QL: NORMAL
EOSINOPHIL # BLD AUTO: 0.1 K/UL (ref 0–0.51)
EOSINOPHIL NFR BLD: 1.5 % (ref 0–6.9)
ERYTHROCYTE [DISTWIDTH] IN BLOOD BY AUTOMATED COUNT: 44.9 FL (ref 35.9–50)
HCT VFR BLD AUTO: 43.3 % (ref 37–47)
HGB BLD-MCNC: 14.3 G/DL (ref 12–16)
IMM GRANULOCYTES # BLD AUTO: 0.02 K/UL (ref 0–0.11)
IMM GRANULOCYTES NFR BLD AUTO: 0.3 % (ref 0–0.9)
LYMPHOCYTES # BLD AUTO: 1.52 K/UL (ref 1–4.8)
LYMPHOCYTES NFR BLD: 23 % (ref 22–41)
MCH RBC QN AUTO: 31.4 PG (ref 27–33)
MCHC RBC AUTO-ENTMCNC: 33 G/DL (ref 33.6–35)
MCV RBC AUTO: 95 FL (ref 81.4–97.8)
MONOCYTES # BLD AUTO: 0.42 K/UL (ref 0–0.85)
MONOCYTES NFR BLD AUTO: 6.3 % (ref 0–13.4)
NEUTROPHILS # BLD AUTO: 4.55 K/UL (ref 2–7.15)
NEUTROPHILS NFR BLD: 68.7 % (ref 44–72)
NRBC # BLD AUTO: 0 K/UL
NRBC BLD-RTO: 0 /100 WBC
PLATELET # BLD AUTO: 280 K/UL (ref 164–446)
PMV BLD AUTO: 9.5 FL (ref 9–12.9)
RBC # BLD AUTO: 4.56 M/UL (ref 4.2–5.4)
RH BLD: NORMAL
RUBV AB SER QL: 16.5 IU/ML
TREPONEMA PALLIDUM IGG+IGM AB [PRESENCE] IN SERUM OR PLASMA BY IMMUNOASSAY: NON REACTIVE
WBC # BLD AUTO: 6.6 K/UL (ref 4.8–10.8)

## 2019-12-13 PROCEDURE — 85025 COMPLETE CBC W/AUTO DIFF WBC: CPT

## 2019-12-13 PROCEDURE — 87340 HEPATITIS B SURFACE AG IA: CPT

## 2019-12-13 PROCEDURE — 87086 URINE CULTURE/COLONY COUNT: CPT

## 2019-12-13 PROCEDURE — 86762 RUBELLA ANTIBODY: CPT

## 2019-12-13 PROCEDURE — 86850 RBC ANTIBODY SCREEN: CPT

## 2019-12-13 PROCEDURE — 86900 BLOOD TYPING SEROLOGIC ABO: CPT

## 2019-12-13 PROCEDURE — 86780 TREPONEMA PALLIDUM: CPT

## 2019-12-13 PROCEDURE — 86901 BLOOD TYPING SEROLOGIC RH(D): CPT

## 2019-12-13 PROCEDURE — 87389 HIV-1 AG W/HIV-1&-2 AB AG IA: CPT

## 2019-12-14 LAB — HBV SURFACE AG SER QL: NEGATIVE

## 2019-12-16 LAB
BACTERIA UR CULT: NORMAL
SIGNIFICANT IND 70042: NORMAL
SITE SITE: NORMAL
SOURCE SOURCE: NORMAL

## 2019-12-17 LAB — HIV 1+2 AB+HIV1 P24 AG SERPL QL IA: NON REACTIVE

## 2020-02-06 ENCOUNTER — HOSPITAL ENCOUNTER (OUTPATIENT)
Dept: LAB | Facility: MEDICAL CENTER | Age: 35
End: 2020-02-06
Attending: NURSE PRACTITIONER
Payer: COMMERCIAL

## 2020-02-06 PROCEDURE — 82105 ALPHA-FETOPROTEIN SERUM: CPT

## 2020-02-11 LAB
# FETUSES US: NORMAL
AFP MOM SERPL: 1.35
AFP SERPL-MCNC: 56 NG/ML
AGE - REPORTED: 34.4 YR
CURRENT SMOKER: NO
FAMILY MEMBER DISEASES HX: NO
GA METHOD: NORMAL
GA: NORMAL WK
IDDM PATIENT QL: NO
INTEGRATED SCN PATIENT-IMP: NORMAL
SPECIMEN DRAWN SERPL: NORMAL

## 2020-04-13 ENCOUNTER — HOSPITAL ENCOUNTER (OUTPATIENT)
Dept: LAB | Facility: MEDICAL CENTER | Age: 35
End: 2020-04-13
Attending: OBSTETRICS & GYNECOLOGY
Payer: COMMERCIAL

## 2020-04-13 LAB
25(OH)D3 SERPL-MCNC: 38 NG/ML (ref 30–100)
BASOPHILS # BLD AUTO: 0.3 % (ref 0–1.8)
BASOPHILS # BLD: 0.03 K/UL (ref 0–0.12)
EOSINOPHIL # BLD AUTO: 0.09 K/UL (ref 0–0.51)
EOSINOPHIL NFR BLD: 0.9 % (ref 0–6.9)
ERYTHROCYTE [DISTWIDTH] IN BLOOD BY AUTOMATED COUNT: 48.8 FL (ref 35.9–50)
GLUCOSE 1H P 50 G GLC PO SERPL-MCNC: 148 MG/DL (ref 70–139)
HCT VFR BLD AUTO: 41.7 % (ref 37–47)
HGB BLD-MCNC: 13.1 G/DL (ref 12–16)
IMM GRANULOCYTES # BLD AUTO: 0.04 K/UL (ref 0–0.11)
IMM GRANULOCYTES NFR BLD AUTO: 0.4 % (ref 0–0.9)
LYMPHOCYTES # BLD AUTO: 1.5 K/UL (ref 1–4.8)
LYMPHOCYTES NFR BLD: 15.5 % (ref 22–41)
MCH RBC QN AUTO: 30.3 PG (ref 27–33)
MCHC RBC AUTO-ENTMCNC: 31.4 G/DL (ref 33.6–35)
MCV RBC AUTO: 96.5 FL (ref 81.4–97.8)
MONOCYTES # BLD AUTO: 0.65 K/UL (ref 0–0.85)
MONOCYTES NFR BLD AUTO: 6.7 % (ref 0–13.4)
NEUTROPHILS # BLD AUTO: 7.36 K/UL (ref 2–7.15)
NEUTROPHILS NFR BLD: 76.2 % (ref 44–72)
NRBC # BLD AUTO: 0 K/UL
NRBC BLD-RTO: 0 /100 WBC
PLATELET # BLD AUTO: 234 K/UL (ref 164–446)
PMV BLD AUTO: 9.4 FL (ref 9–12.9)
RBC # BLD AUTO: 4.32 M/UL (ref 4.2–5.4)
WBC # BLD AUTO: 9.7 K/UL (ref 4.8–10.8)

## 2020-04-13 PROCEDURE — 85025 COMPLETE CBC W/AUTO DIFF WBC: CPT

## 2020-04-13 PROCEDURE — 82950 GLUCOSE TEST: CPT

## 2020-04-13 PROCEDURE — 82306 VITAMIN D 25 HYDROXY: CPT

## 2020-04-20 ENCOUNTER — HOSPITAL ENCOUNTER (OUTPATIENT)
Dept: LAB | Facility: MEDICAL CENTER | Age: 35
End: 2020-04-20
Attending: OBSTETRICS & GYNECOLOGY
Payer: COMMERCIAL

## 2020-04-20 LAB
GLUCOSE 1H P CHAL SERPL-MCNC: 149 MG/DL (ref 65–180)
GLUCOSE 2H P CHAL SERPL-MCNC: 137 MG/DL (ref 65–155)
GLUCOSE 3H P CHAL SERPL-MCNC: 121 MG/DL (ref 65–140)
GLUCOSE BS SERPL-MCNC: 80 MG/DL (ref 65–95)

## 2020-04-20 PROCEDURE — 82951 GLUCOSE TOLERANCE TEST (GTT): CPT

## 2020-04-20 PROCEDURE — 82952 GTT-ADDED SAMPLES: CPT

## 2020-06-09 ENCOUNTER — HOSPITAL ENCOUNTER (OUTPATIENT)
Facility: MEDICAL CENTER | Age: 35
End: 2020-06-09
Attending: OBSTETRICS & GYNECOLOGY | Admitting: OBSTETRICS & GYNECOLOGY
Payer: COMMERCIAL

## 2020-06-09 VITALS
OXYGEN SATURATION: 96 % | HEIGHT: 63 IN | WEIGHT: 180 LBS | TEMPERATURE: 98.7 F | RESPIRATION RATE: 18 BRPM | DIASTOLIC BLOOD PRESSURE: 68 MMHG | BODY MASS INDEX: 31.89 KG/M2 | SYSTOLIC BLOOD PRESSURE: 116 MMHG | HEART RATE: 101 BPM

## 2020-06-09 LAB
APPEARANCE UR: CLEAR
COLOR UR AUTO: YELLOW
GLUCOSE UR QL STRIP.AUTO: NEGATIVE MG/DL
KETONES UR QL STRIP.AUTO: NEGATIVE MG/DL
LEUKOCYTE ESTERASE UR QL STRIP.AUTO: ABNORMAL
NITRITE UR QL STRIP.AUTO: NEGATIVE
PH UR STRIP.AUTO: 7 [PH] (ref 5–8)
PROT UR QL STRIP: NEGATIVE MG/DL
RBC UR QL AUTO: NEGATIVE
SP GR UR: 1.01 (ref 1–1.03)

## 2020-06-09 PROCEDURE — 81002 URINALYSIS NONAUTO W/O SCOPE: CPT

## 2020-06-09 PROCEDURE — 59025 FETAL NON-STRESS TEST: CPT

## 2020-06-09 RX ORDER — ASPIRIN 81 MG/1
81 TABLET, CHEWABLE ORAL DAILY
Status: ON HOLD | COMMUNITY
End: 2020-07-11

## 2020-06-09 SDOH — ECONOMIC STABILITY: TRANSPORTATION INSECURITY
IN THE PAST 12 MONTHS, HAS THE LACK OF TRANSPORTATION KEPT YOU FROM MEDICAL APPOINTMENTS OR FROM GETTING MEDICATIONS?: PATIENT DECLINED

## 2020-06-09 SDOH — ECONOMIC STABILITY: TRANSPORTATION INSECURITY
IN THE PAST 12 MONTHS, HAS LACK OF TRANSPORTATION KEPT YOU FROM MEETINGS, WORK, OR FROM GETTING THINGS NEEDED FOR DAILY LIVING?: PATIENT DECLINED

## 2020-06-09 SDOH — ECONOMIC STABILITY: FOOD INSECURITY: WITHIN THE PAST 12 MONTHS, THE FOOD YOU BOUGHT JUST DIDN'T LAST AND YOU DIDN'T HAVE MONEY TO GET MORE.: PATIENT DECLINED

## 2020-06-09 SDOH — ECONOMIC STABILITY: FOOD INSECURITY: WITHIN THE PAST 12 MONTHS, YOU WORRIED THAT YOUR FOOD WOULD RUN OUT BEFORE YOU GOT MONEY TO BUY MORE.: PATIENT DECLINED

## 2020-06-09 ASSESSMENT — PAIN SCALES - GENERAL: PAINLEVEL: 3

## 2020-06-09 NOTE — PROGRESS NOTES
G 5 P1 EDC 7-9 35.5 weeks . Pt is here with C/O swelling and pain in her hands plus visual changes. Denies H/A, epigastric pain but has some nausea.  DTRs are +2 with clonus absent.  BPs are WNL. Urine has no protein. Dr Olivares was notified and order received to discharge pt home.  1208 discharge instructions given and pt was discharged home with FOB in stable condition. Reactive NST.

## 2020-06-09 NOTE — DISCHARGE INSTRUCTIONS
General Instructions:  · If you think you are in labor, time contractions (lying on your left side) from the beginning of one contraction to the beginning of the next contraction for at least one hour.  · Increase fluid intake: you should consume 10-12 8 oz glasses of non-caffeinated fluid per day.  · Report any pressure or burning on urination to your physician.  · Monitor fetal movement: If you notice an absence or decrease in fetal movement, drink a large glass of water and rest on your side.  If there is no increase in movement, call your physician or go to the hospital for further evaluation.  · Report any sudden, sharp abdominal pain.  · Report any bleeding.  Spotting or pinkish discharge is normal after vaginal exam.  You may also spot after sexual intercourse.  General Instructions:  If you think you are in labor, time contractions (lying on your left side) from the beginning of one contraction to the beginning of the next contraction for at least one hour.  Increase fluid intake: you should consume 10-12 8 oz glasses of non-caffeinated fluid per day.  Report any pressure or burning on urination to your physician.  Monitor fetal movement: If you notice an absence or decrease in fetal movement, drink a large glass of water and rest on your side.  If there is no increase in movement, call your physician or go to the hospital for further evaluation.  Report any sudden, sharp abdominal pain.  Report any bleeding.  Spotting or pinkish discharge is normal after vaginal exam.  You may also spot after sexual intercourse.    High Blood Pressure:  Rest on your right or left side.  Report any severe headaches, dizziness, blurred vision or spots before your eyes.  Report excessive swelling of your hands, face or feet.  Report epigastric pain (upper abdominal pain)      Other Instructions:  Please carefully review your entire AFTER VISIT SUMMARY document for all discharge instructions.  Labor Instructions (37 - 39  weeks):  Call your physician or return to hospital if:  · You have regular contractions that get progressively closer, longer and stronger.  · Your water breaks (remember time and color).  · You have bleeding like a period.  · Your baby does not move enough to complete the daily kick counts (10 movements in 2 hours)  · Your baby moves much less often than on the days before or you have not felt your baby move all day.    High Blood Pressure:  · Rest on your right or left side.  · Report any severe headaches, dizziness, blurred vision or spots before your eyes.  · Report excessive swelling of your hands, face or feet.  · Report epigastric pain (upper abdominal pain)      Other Instructions:  Please carefully review your entire AFTER VISIT SUMMARY document for all discharge instructions.

## 2020-06-10 NOTE — CONSULTS
DATE OF SERVICE:  2020    BRIEF CONSULT/NST READING    This is a prior patient of Dr. Constantino's.  She is a 35-year-old female,    5, para 2 at 35 and 5/7th weeks' gestational age who presented to the   hospital, stating that she felt that she possibly had elevated blood pressures   at home.  Patient states that she had elevated blood pressures with her last   pregnancy and she was feeling some swelling in her hands and feet.  She also   had some nausea.  She did not have any epigastric pain or visual changes.  She   presented to the hospital and her blood pressures were all completely within   normal limits in the one-teens to 120s/70s-80s.  Her urine was dipped.  She   had no protein.  Fetal heart tones were originally slightly tachycardic in the   170s, but quickly settled down to the 150s and category 1s.  She was   discharged home in stable condition and was told to follow up as scheduled   with Dr. Constantino.       ____________________________________     Corinne E. Capurro, MD [facility] / [facility]    DD:  2020 13:23:24  DT:  2020 17:33:24    D#:  [id number]  Job#:  [id number]

## 2020-06-11 ENCOUNTER — HOSPITAL ENCOUNTER (OUTPATIENT)
Dept: LAB | Facility: MEDICAL CENTER | Age: 35
End: 2020-06-11
Attending: OBSTETRICS & GYNECOLOGY
Payer: COMMERCIAL

## 2020-06-11 LAB
ALBUMIN SERPL BCP-MCNC: 3.4 G/DL (ref 3.2–4.9)
ALBUMIN/GLOB SERPL: 1.3 G/DL
ALP SERPL-CCNC: 102 U/L (ref 30–99)
ALT SERPL-CCNC: 14 U/L (ref 2–50)
ANION GAP SERPL CALC-SCNC: 14 MMOL/L (ref 7–16)
AST SERPL-CCNC: 17 U/L (ref 12–45)
BASOPHILS # BLD AUTO: 0.2 % (ref 0–1.8)
BASOPHILS # BLD: 0.02 K/UL (ref 0–0.12)
BILIRUB SERPL-MCNC: 0.2 MG/DL (ref 0.1–1.5)
BUN SERPL-MCNC: 3 MG/DL (ref 8–22)
CALCIUM SERPL-MCNC: 8.6 MG/DL (ref 8.5–10.5)
CHLORIDE SERPL-SCNC: 106 MMOL/L (ref 96–112)
CO2 SERPL-SCNC: 17 MMOL/L (ref 20–33)
CREAT SERPL-MCNC: 0.46 MG/DL (ref 0.5–1.4)
CREAT UR-MCNC: 25.39 MG/DL
EOSINOPHIL # BLD AUTO: 0.1 K/UL (ref 0–0.51)
EOSINOPHIL NFR BLD: 1.2 % (ref 0–6.9)
ERYTHROCYTE [DISTWIDTH] IN BLOOD BY AUTOMATED COUNT: 46.5 FL (ref 35.9–50)
GLOBULIN SER CALC-MCNC: 2.6 G/DL (ref 1.9–3.5)
GLUCOSE SERPL-MCNC: 100 MG/DL (ref 65–99)
HCT VFR BLD AUTO: 40 % (ref 37–47)
HGB BLD-MCNC: 13.3 G/DL (ref 12–16)
IMM GRANULOCYTES # BLD AUTO: 0.03 K/UL (ref 0–0.11)
IMM GRANULOCYTES NFR BLD AUTO: 0.3 % (ref 0–0.9)
LYMPHOCYTES # BLD AUTO: 1.43 K/UL (ref 1–4.8)
LYMPHOCYTES NFR BLD: 16.6 % (ref 22–41)
MCH RBC QN AUTO: 30.6 PG (ref 27–33)
MCHC RBC AUTO-ENTMCNC: 33.3 G/DL (ref 33.6–35)
MCV RBC AUTO: 92 FL (ref 81.4–97.8)
MONOCYTES # BLD AUTO: 0.65 K/UL (ref 0–0.85)
MONOCYTES NFR BLD AUTO: 7.5 % (ref 0–13.4)
NEUTROPHILS # BLD AUTO: 6.41 K/UL (ref 2–7.15)
NEUTROPHILS NFR BLD: 74.2 % (ref 44–72)
NRBC # BLD AUTO: 0 K/UL
NRBC BLD-RTO: 0 /100 WBC
PLATELET # BLD AUTO: 205 K/UL (ref 164–446)
PMV BLD AUTO: 9.7 FL (ref 9–12.9)
POTASSIUM SERPL-SCNC: 3.6 MMOL/L (ref 3.6–5.5)
PROT SERPL-MCNC: 6 G/DL (ref 6–8.2)
PROT UR-MCNC: 7 MG/DL (ref 0–15)
PROT/CREAT UR: 276 MG/G (ref 10–107)
RBC # BLD AUTO: 4.35 M/UL (ref 4.2–5.4)
SODIUM SERPL-SCNC: 137 MMOL/L (ref 135–145)
WBC # BLD AUTO: 8.6 K/UL (ref 4.8–10.8)

## 2020-06-11 PROCEDURE — 82570 ASSAY OF URINE CREATININE: CPT

## 2020-06-11 PROCEDURE — 85025 COMPLETE CBC W/AUTO DIFF WBC: CPT

## 2020-06-11 PROCEDURE — 80053 COMPREHEN METABOLIC PANEL: CPT

## 2020-06-11 PROCEDURE — 84156 ASSAY OF PROTEIN URINE: CPT

## 2020-07-09 ENCOUNTER — HOSPITAL ENCOUNTER (INPATIENT)
Facility: MEDICAL CENTER | Age: 35
LOS: 2 days | End: 2020-07-11
Attending: OBSTETRICS & GYNECOLOGY | Admitting: OBSTETRICS & GYNECOLOGY
Payer: COMMERCIAL

## 2020-07-09 ENCOUNTER — ANESTHESIA (OUTPATIENT)
Dept: OBGYN | Facility: MEDICAL CENTER | Age: 35
End: 2020-07-09
Payer: COMMERCIAL

## 2020-07-09 ENCOUNTER — ANESTHESIA EVENT (OUTPATIENT)
Dept: OBGYN | Facility: MEDICAL CENTER | Age: 35
End: 2020-07-09
Payer: COMMERCIAL

## 2020-07-09 DIAGNOSIS — G89.18 POST-OPERATIVE PAIN: ICD-10-CM

## 2020-07-09 LAB
BASOPHILS # BLD AUTO: 0.3 % (ref 0–1.8)
BASOPHILS # BLD: 0.03 K/UL (ref 0–0.12)
COVID ORDER STATUS COVID19: NORMAL
EOSINOPHIL # BLD AUTO: 0.05 K/UL (ref 0–0.51)
EOSINOPHIL NFR BLD: 0.4 % (ref 0–6.9)
ERYTHROCYTE [DISTWIDTH] IN BLOOD BY AUTOMATED COUNT: 45 FL (ref 35.9–50)
HCT VFR BLD AUTO: 42.5 % (ref 37–47)
HGB BLD-MCNC: 14.5 G/DL (ref 12–16)
HOLDING TUBE BB 8507: NORMAL
IMM GRANULOCYTES # BLD AUTO: 0.05 K/UL (ref 0–0.11)
IMM GRANULOCYTES NFR BLD AUTO: 0.4 % (ref 0–0.9)
LYMPHOCYTES # BLD AUTO: 2.23 K/UL (ref 1–4.8)
LYMPHOCYTES NFR BLD: 19.6 % (ref 22–41)
MCH RBC QN AUTO: 30.7 PG (ref 27–33)
MCHC RBC AUTO-ENTMCNC: 34.1 G/DL (ref 33.6–35)
MCV RBC AUTO: 90 FL (ref 81.4–97.8)
MONOCYTES # BLD AUTO: 0.85 K/UL (ref 0–0.85)
MONOCYTES NFR BLD AUTO: 7.5 % (ref 0–13.4)
NEUTROPHILS # BLD AUTO: 8.17 K/UL (ref 2–7.15)
NEUTROPHILS NFR BLD: 71.8 % (ref 44–72)
NRBC # BLD AUTO: 0 K/UL
NRBC BLD-RTO: 0 /100 WBC
PLATELET # BLD AUTO: 226 K/UL (ref 164–446)
PMV BLD AUTO: 9.7 FL (ref 9–12.9)
RBC # BLD AUTO: 4.72 M/UL (ref 4.2–5.4)
SARS-COV-2 RDRP RESP QL NAA+PROBE: NOTDETECTED
SPECIMEN SOURCE: NORMAL
WBC # BLD AUTO: 11.4 K/UL (ref 4.8–10.8)

## 2020-07-09 PROCEDURE — A9270 NON-COVERED ITEM OR SERVICE: HCPCS | Performed by: OBSTETRICS & GYNECOLOGY

## 2020-07-09 PROCEDURE — 770002 HCHG ROOM/CARE - OB PRIVATE (112)

## 2020-07-09 PROCEDURE — 700102 HCHG RX REV CODE 250 W/ 637 OVERRIDE(OP): Performed by: ANESTHESIOLOGY

## 2020-07-09 PROCEDURE — 700111 HCHG RX REV CODE 636 W/ 250 OVERRIDE (IP): Performed by: ANESTHESIOLOGY

## 2020-07-09 PROCEDURE — 160002 HCHG RECOVERY MINUTES (STAT): Performed by: OBSTETRICS & GYNECOLOGY

## 2020-07-09 PROCEDURE — 700111 HCHG RX REV CODE 636 W/ 250 OVERRIDE (IP): Performed by: OBSTETRICS & GYNECOLOGY

## 2020-07-09 PROCEDURE — C9803 HOPD COVID-19 SPEC COLLECT: HCPCS | Performed by: OBSTETRICS & GYNECOLOGY

## 2020-07-09 PROCEDURE — 700105 HCHG RX REV CODE 258: Performed by: OBSTETRICS & GYNECOLOGY

## 2020-07-09 PROCEDURE — 700112 HCHG RX REV CODE 229: Performed by: OBSTETRICS & GYNECOLOGY

## 2020-07-09 PROCEDURE — 700101 HCHG RX REV CODE 250: Performed by: ANESTHESIOLOGY

## 2020-07-09 PROCEDURE — 306288 HCHG RETRACTOR C SECTION LG

## 2020-07-09 PROCEDURE — 700102 HCHG RX REV CODE 250 W/ 637 OVERRIDE(OP): Performed by: OBSTETRICS & GYNECOLOGY

## 2020-07-09 PROCEDURE — 160048 HCHG OR STATISTICAL LEVEL 1-5: Performed by: OBSTETRICS & GYNECOLOGY

## 2020-07-09 PROCEDURE — A9270 NON-COVERED ITEM OR SERVICE: HCPCS | Performed by: ANESTHESIOLOGY

## 2020-07-09 PROCEDURE — 160041 HCHG SURGERY MINUTES - EA ADDL 1 MIN LEVEL 4: Performed by: OBSTETRICS & GYNECOLOGY

## 2020-07-09 PROCEDURE — U0004 COV-19 TEST NON-CDC HGH THRU: HCPCS

## 2020-07-09 PROCEDURE — 160009 HCHG ANES TIME/MIN: Performed by: OBSTETRICS & GYNECOLOGY

## 2020-07-09 PROCEDURE — 160035 HCHG PACU - 1ST 60 MINS PHASE I: Performed by: OBSTETRICS & GYNECOLOGY

## 2020-07-09 PROCEDURE — 85025 COMPLETE CBC W/AUTO DIFF WBC: CPT

## 2020-07-09 PROCEDURE — 160029 HCHG SURGERY MINUTES - 1ST 30 MINS LEVEL 4: Performed by: OBSTETRICS & GYNECOLOGY

## 2020-07-09 RX ORDER — HALOPERIDOL 5 MG/ML
1 INJECTION INTRAMUSCULAR
Status: DISCONTINUED | OUTPATIENT
Start: 2020-07-09 | End: 2020-07-09 | Stop reason: HOSPADM

## 2020-07-09 RX ORDER — SODIUM CHLORIDE, SODIUM LACTATE, POTASSIUM CHLORIDE, CALCIUM CHLORIDE 600; 310; 30; 20 MG/100ML; MG/100ML; MG/100ML; MG/100ML
INJECTION, SOLUTION INTRAVENOUS PRN
Status: DISCONTINUED | OUTPATIENT
Start: 2020-07-09 | End: 2020-07-11 | Stop reason: HOSPADM

## 2020-07-09 RX ORDER — KETOROLAC TROMETHAMINE 30 MG/ML
INJECTION, SOLUTION INTRAMUSCULAR; INTRAVENOUS PRN
Status: DISCONTINUED | OUTPATIENT
Start: 2020-07-09 | End: 2020-07-09

## 2020-07-09 RX ORDER — METOCLOPRAMIDE HYDROCHLORIDE 5 MG/ML
INJECTION INTRAMUSCULAR; INTRAVENOUS
Status: ACTIVE
Start: 2020-07-09 | End: 2020-07-10

## 2020-07-09 RX ORDER — PHENYLEPHRINE HYDROCHLORIDE 10 MG/ML
INJECTION, SOLUTION INTRAMUSCULAR; INTRAVENOUS; SUBCUTANEOUS PRN
Status: DISCONTINUED | OUTPATIENT
Start: 2020-07-09 | End: 2020-07-10 | Stop reason: SURG

## 2020-07-09 RX ORDER — CITRIC ACID/SODIUM CITRATE 334-500MG
30 SOLUTION, ORAL ORAL ONCE
Status: CANCELLED | OUTPATIENT
Start: 2020-07-09 | End: 2020-07-09

## 2020-07-09 RX ORDER — DIPHENHYDRAMINE HYDROCHLORIDE 50 MG/ML
12.5 INJECTION INTRAMUSCULAR; INTRAVENOUS
Status: DISCONTINUED | OUTPATIENT
Start: 2020-07-09 | End: 2020-07-09 | Stop reason: HOSPADM

## 2020-07-09 RX ORDER — DIPHENHYDRAMINE HYDROCHLORIDE 50 MG/ML
12.5 INJECTION INTRAMUSCULAR; INTRAVENOUS EVERY 6 HOURS PRN
Status: DISCONTINUED | OUTPATIENT
Start: 2020-07-09 | End: 2020-07-10 | Stop reason: HOSPADM

## 2020-07-09 RX ORDER — MORPHINE SULFATE 0.5 MG/ML
INJECTION, SOLUTION EPIDURAL; INTRATHECAL; INTRAVENOUS
Status: COMPLETED | OUTPATIENT
Start: 2020-07-09 | End: 2020-07-09

## 2020-07-09 RX ORDER — KETOROLAC TROMETHAMINE 30 MG/ML
30 INJECTION, SOLUTION INTRAMUSCULAR; INTRAVENOUS EVERY 6 HOURS
Status: DISCONTINUED | OUTPATIENT
Start: 2020-07-09 | End: 2020-07-10 | Stop reason: HOSPADM

## 2020-07-09 RX ORDER — MISOPROSTOL 200 UG/1
800 TABLET ORAL
Status: DISCONTINUED | OUTPATIENT
Start: 2020-07-09 | End: 2020-07-11 | Stop reason: HOSPADM

## 2020-07-09 RX ORDER — SODIUM CHLORIDE, SODIUM GLUCONATE, SODIUM ACETATE, POTASSIUM CHLORIDE AND MAGNESIUM CHLORIDE 526; 502; 368; 37; 30 MG/100ML; MG/100ML; MG/100ML; MG/100ML; MG/100ML
1500 INJECTION, SOLUTION INTRAVENOUS ONCE
Status: COMPLETED | OUTPATIENT
Start: 2020-07-09 | End: 2020-07-09

## 2020-07-09 RX ORDER — HYDROMORPHONE HYDROCHLORIDE 1 MG/ML
0.4 INJECTION, SOLUTION INTRAMUSCULAR; INTRAVENOUS; SUBCUTANEOUS
Status: DISCONTINUED | OUTPATIENT
Start: 2020-07-09 | End: 2020-07-10 | Stop reason: HOSPADM

## 2020-07-09 RX ORDER — DIPHENHYDRAMINE HYDROCHLORIDE 50 MG/ML
25 INJECTION INTRAMUSCULAR; INTRAVENOUS EVERY 6 HOURS PRN
Status: DISCONTINUED | OUTPATIENT
Start: 2020-07-09 | End: 2020-07-10 | Stop reason: HOSPADM

## 2020-07-09 RX ORDER — HYDROMORPHONE HYDROCHLORIDE 1 MG/ML
0.2 INJECTION, SOLUTION INTRAMUSCULAR; INTRAVENOUS; SUBCUTANEOUS
Status: DISCONTINUED | OUTPATIENT
Start: 2020-07-09 | End: 2020-07-10 | Stop reason: HOSPADM

## 2020-07-09 RX ORDER — ONDANSETRON 2 MG/ML
4 INJECTION INTRAMUSCULAR; INTRAVENOUS
Status: DISCONTINUED | OUTPATIENT
Start: 2020-07-09 | End: 2020-07-09 | Stop reason: HOSPADM

## 2020-07-09 RX ORDER — METOCLOPRAMIDE HYDROCHLORIDE 5 MG/ML
10 INJECTION INTRAMUSCULAR; INTRAVENOUS ONCE
Status: COMPLETED | OUTPATIENT
Start: 2020-07-09 | End: 2020-07-09

## 2020-07-09 RX ORDER — CITRIC ACID/SODIUM CITRATE 334-500MG
30 SOLUTION, ORAL ORAL ONCE
Status: COMPLETED | OUTPATIENT
Start: 2020-07-09 | End: 2020-07-09

## 2020-07-09 RX ORDER — SODIUM CHLORIDE, SODIUM LACTATE, POTASSIUM CHLORIDE, CALCIUM CHLORIDE 600; 310; 30; 20 MG/100ML; MG/100ML; MG/100ML; MG/100ML
INJECTION, SOLUTION INTRAVENOUS CONTINUOUS
Status: DISCONTINUED | OUTPATIENT
Start: 2020-07-09 | End: 2020-07-09 | Stop reason: HOSPADM

## 2020-07-09 RX ORDER — SIMETHICONE 80 MG
80 TABLET,CHEWABLE ORAL 4 TIMES DAILY PRN
Status: DISCONTINUED | OUTPATIENT
Start: 2020-07-09 | End: 2020-07-11 | Stop reason: HOSPADM

## 2020-07-09 RX ORDER — SODIUM CHLORIDE, SODIUM GLUCONATE, SODIUM ACETATE, POTASSIUM CHLORIDE AND MAGNESIUM CHLORIDE 526; 502; 368; 37; 30 MG/100ML; MG/100ML; MG/100ML; MG/100ML; MG/100ML
1500 INJECTION, SOLUTION INTRAVENOUS ONCE
Status: CANCELLED | OUTPATIENT
Start: 2020-07-09 | End: 2020-07-09

## 2020-07-09 RX ORDER — VITAMIN A ACETATE, BETA CAROTENE, ASCORBIC ACID, CHOLECALCIFEROL, .ALPHA.-TOCOPHEROL ACETATE, DL-, THIAMINE MONONITRATE, RIBOFLAVIN, NIACINAMIDE, PYRIDOXINE HYDROCHLORIDE, FOLIC ACID, CYANOCOBALAMIN, CALCIUM CARBONATE, FERROUS FUMARATE, ZINC OXIDE, CUPRIC OXIDE 3080; 12; 120; 400; 1; 1.84; 3; 20; 22; 920; 25; 200; 27; 10; 2 [IU]/1; UG/1; MG/1; [IU]/1; MG/1; MG/1; MG/1; MG/1; MG/1; [IU]/1; MG/1; MG/1; MG/1; MG/1; MG/1
1 TABLET, FILM COATED ORAL EVERY MORNING
Status: DISCONTINUED | OUTPATIENT
Start: 2020-07-10 | End: 2020-07-11 | Stop reason: HOSPADM

## 2020-07-09 RX ORDER — CEFAZOLIN SODIUM 1 G/3ML
INJECTION, POWDER, FOR SOLUTION INTRAMUSCULAR; INTRAVENOUS PRN
Status: DISCONTINUED | OUTPATIENT
Start: 2020-07-09 | End: 2020-07-10 | Stop reason: SURG

## 2020-07-09 RX ORDER — ACETAMINOPHEN 500 MG
1000 TABLET ORAL EVERY 6 HOURS
Status: DISPENSED | OUTPATIENT
Start: 2020-07-09 | End: 2020-07-10

## 2020-07-09 RX ORDER — MEPERIDINE HYDROCHLORIDE 25 MG/ML
12.5 INJECTION INTRAMUSCULAR; INTRAVENOUS; SUBCUTANEOUS
Status: DISCONTINUED | OUTPATIENT
Start: 2020-07-09 | End: 2020-07-09 | Stop reason: HOSPADM

## 2020-07-09 RX ORDER — BUPIVACAINE HYDROCHLORIDE 7.5 MG/ML
INJECTION, SOLUTION INTRASPINAL
Status: COMPLETED | OUTPATIENT
Start: 2020-07-09 | End: 2020-07-09

## 2020-07-09 RX ORDER — MIDAZOLAM HYDROCHLORIDE 1 MG/ML
1 INJECTION INTRAMUSCULAR; INTRAVENOUS
Status: DISCONTINUED | OUTPATIENT
Start: 2020-07-09 | End: 2020-07-09 | Stop reason: HOSPADM

## 2020-07-09 RX ORDER — METOCLOPRAMIDE HYDROCHLORIDE 5 MG/ML
10 INJECTION INTRAMUSCULAR; INTRAVENOUS ONCE
Status: CANCELLED | OUTPATIENT
Start: 2020-07-09 | End: 2020-07-09

## 2020-07-09 RX ORDER — CITRIC ACID/SODIUM CITRATE 334-500MG
SOLUTION, ORAL ORAL
Status: ACTIVE
Start: 2020-07-09 | End: 2020-07-10

## 2020-07-09 RX ORDER — HYDROMORPHONE HYDROCHLORIDE 1 MG/ML
0.4 INJECTION, SOLUTION INTRAMUSCULAR; INTRAVENOUS; SUBCUTANEOUS
Status: DISCONTINUED | OUTPATIENT
Start: 2020-07-09 | End: 2020-07-09 | Stop reason: HOSPADM

## 2020-07-09 RX ORDER — LABETALOL HYDROCHLORIDE 5 MG/ML
5 INJECTION, SOLUTION INTRAVENOUS
Status: DISCONTINUED | OUTPATIENT
Start: 2020-07-09 | End: 2020-07-09 | Stop reason: HOSPADM

## 2020-07-09 RX ORDER — SODIUM CHLORIDE, SODIUM GLUCONATE, SODIUM ACETATE, POTASSIUM CHLORIDE AND MAGNESIUM CHLORIDE 526; 502; 368; 37; 30 MG/100ML; MG/100ML; MG/100ML; MG/100ML; MG/100ML
500 INJECTION, SOLUTION INTRAVENOUS CONTINUOUS
Status: DISCONTINUED | OUTPATIENT
Start: 2020-07-09 | End: 2020-07-09 | Stop reason: HOSPADM

## 2020-07-09 RX ORDER — HYDROMORPHONE HYDROCHLORIDE 1 MG/ML
0.2 INJECTION, SOLUTION INTRAMUSCULAR; INTRAVENOUS; SUBCUTANEOUS
Status: DISCONTINUED | OUTPATIENT
Start: 2020-07-09 | End: 2020-07-09 | Stop reason: HOSPADM

## 2020-07-09 RX ORDER — ONDANSETRON 2 MG/ML
4 INJECTION INTRAMUSCULAR; INTRAVENOUS EVERY 6 HOURS PRN
Status: DISCONTINUED | OUTPATIENT
Start: 2020-07-09 | End: 2020-07-10 | Stop reason: HOSPADM

## 2020-07-09 RX ORDER — DOCUSATE SODIUM 100 MG/1
100 CAPSULE, LIQUID FILLED ORAL 2 TIMES DAILY
Status: DISCONTINUED | OUTPATIENT
Start: 2020-07-09 | End: 2020-07-11 | Stop reason: HOSPADM

## 2020-07-09 RX ORDER — OXYTOCIN 10 [USP'U]/ML
INJECTION, SOLUTION INTRAMUSCULAR; INTRAVENOUS PRN
Status: DISCONTINUED | OUTPATIENT
Start: 2020-07-09 | End: 2020-07-10 | Stop reason: SURG

## 2020-07-09 RX ORDER — HYDROMORPHONE HYDROCHLORIDE 1 MG/ML
0.1 INJECTION, SOLUTION INTRAMUSCULAR; INTRAVENOUS; SUBCUTANEOUS
Status: DISCONTINUED | OUTPATIENT
Start: 2020-07-09 | End: 2020-07-09 | Stop reason: HOSPADM

## 2020-07-09 RX ADMIN — OXYTOCIN 1000 ML: 10 INJECTION, SOLUTION INTRAMUSCULAR; INTRAVENOUS at 16:16

## 2020-07-09 RX ADMIN — DOCUSATE SODIUM 100 MG: 100 CAPSULE, LIQUID FILLED ORAL at 18:46

## 2020-07-09 RX ADMIN — SODIUM CITRATE AND CITRIC ACID MONOHYDRATE 30 ML: 500; 334 SOLUTION ORAL at 15:24

## 2020-07-09 RX ADMIN — FAMOTIDINE 20 MG: 10 INJECTION, SOLUTION INTRAVENOUS at 15:24

## 2020-07-09 RX ADMIN — KETOROLAC TROMETHAMINE 30 MG: 30 INJECTION, SOLUTION INTRAMUSCULAR at 16:17

## 2020-07-09 RX ADMIN — OXYTOCIN 30 UNITS: 10 INJECTION, SOLUTION INTRAMUSCULAR; INTRAVENOUS at 16:02

## 2020-07-09 RX ADMIN — FENTANYL CITRATE 15 MCG: 50 INJECTION INTRAMUSCULAR; INTRAVENOUS at 15:35

## 2020-07-09 RX ADMIN — ACETAMINOPHEN 1000 MG: 500 TABLET ORAL at 18:45

## 2020-07-09 RX ADMIN — METOCLOPRAMIDE 10 MG: 5 INJECTION, SOLUTION INTRAMUSCULAR; INTRAVENOUS at 15:23

## 2020-07-09 RX ADMIN — SODIUM CHLORIDE, SODIUM GLUCONATE, SODIUM ACETATE, POTASSIUM CHLORIDE AND MAGNESIUM CHLORIDE: 526; 502; 368; 37; 30 INJECTION, SOLUTION INTRAVENOUS at 15:33

## 2020-07-09 RX ADMIN — KETOROLAC TROMETHAMINE 30 MG: 30 INJECTION, SOLUTION INTRAMUSCULAR at 23:08

## 2020-07-09 RX ADMIN — MORPHINE SULFATE 150 MCG: 0.5 INJECTION, SOLUTION EPIDURAL; INTRATHECAL; INTRAVENOUS at 15:35

## 2020-07-09 RX ADMIN — CEFAZOLIN 2 G: 330 INJECTION, POWDER, FOR SOLUTION INTRAMUSCULAR; INTRAVENOUS at 15:42

## 2020-07-09 RX ADMIN — BUPIVACAINE HYDROCHLORIDE IN DEXTROSE 1.4 ML: 7.5 INJECTION, SOLUTION SUBARACHNOID at 15:35

## 2020-07-09 RX ADMIN — PHENYLEPHRINE HYDROCHLORIDE 100 MCG: 10 INJECTION INTRAVENOUS at 15:45

## 2020-07-09 ASSESSMENT — PATIENT HEALTH QUESTIONNAIRE - PHQ9
1. LITTLE INTEREST OR PLEASURE IN DOING THINGS: NOT AT ALL
SUM OF ALL RESPONSES TO PHQ9 QUESTIONS 1 AND 2: 0
2. FEELING DOWN, DEPRESSED, IRRITABLE, OR HOPELESS: NOT AT ALL

## 2020-07-09 ASSESSMENT — LIFESTYLE VARIABLES
TOTAL SCORE: 0
DOES PATIENT WANT TO STOP DRINKING: NO
ON A TYPICAL DAY WHEN YOU DRINK ALCOHOL HOW MANY DRINKS DO YOU HAVE: 0
CONSUMPTION TOTAL: NEGATIVE
EVER_SMOKED: YES
HAVE YOU EVER FELT YOU SHOULD CUT DOWN ON YOUR DRINKING: NO
EVER FELT BAD OR GUILTY ABOUT YOUR DRINKING: NO
TOTAL SCORE: 0
ALCOHOL_USE: NO
EVER HAD A DRINK FIRST THING IN THE MORNING TO STEADY YOUR NERVES TO GET RID OF A HANGOVER: NO
TOTAL SCORE: 0
HOW MANY TIMES IN THE PAST YEAR HAVE YOU HAD 5 OR MORE DRINKS IN A DAY: 0
HAVE PEOPLE ANNOYED YOU BY CRITICIZING YOUR DRINKING: NO
AVERAGE NUMBER OF DAYS PER WEEK YOU HAVE A DRINK CONTAINING ALCOHOL: 0

## 2020-07-09 ASSESSMENT — FIBROSIS 4 INDEX: FIB4 SCORE: 0.75

## 2020-07-09 NOTE — OP REPORT
"DATE OF SERVICE:  2020     PREOPERATIVE DIAGNOSES:  1.  Intrauterine pregnancy at 40w0d  2.  Prior  section x 1  3.  GBS neg     POSTOPERATIVE DIAGNOSES:  1.  Intrauterine pregnancy at 40w0d  2.  same    PROCEDURE PERFORMED:  Repeat low transverse  section.     SURGEON:  Elisabeth Constantino MD     ASSISTANT: Rodri Moss MD     ANESTHESIA:  Spinal.     ANESTHESIOLOGIST:  Karl Guerrero MD     SPECIMEN:  none     ESTIMATED BLOOD LOSS:  500 mL     FINDINGS:  viable female infant named \"Navy\" in cephalic presentation, occiput facing posterior, with apgars 8 and 9, weight 8lb 14oz, single loose nuchal cord, clear amniotic fluid. Normal uterus, tubes, and ovaries bilaterally    COMPLICATIONS:  None.     PROCEDURE:  After appropriate consents were obtained, the patient was taken to the operating room where spinal  anesthesia was applied without complications.  The patient was then prepped and draped in the usual sterile manner.  Clamp test on the skin verified adequate anesthesia.  A Pfannenstiel incision was made with a scalpel 3cm superior to the pubic symphysis and extended down to the rectus fascia.  The rectus fascia was incised with the scalpel and tented up. The underlying rectus muscle was  from the fascia first inferiorly and then superiorly using the dia scissors.  The rectus muscle was  bluntly in the midline.  The peritoneum was entered bluntly in the midline. The peritoneum incision was extended superiorly and inferiorly with the Metzenbaum scissors with great care to avoid injury to underlying bowel or bladder.  Mariano retractor was placed. The vesicouterine peritoneum was tented up and entered with Metzenbaum scissors, and a bladder flap was created.  An incision was made into the lower uterine segment transversely and the incision was extended bluntly.  Amniotomy was performed and there was noted to be clear amniotic fluid. The Infant's head was grasped and delivered " atraumatically followed by the remainder of the body without any complications.  The mouth and nares were suctioned. The cord was doubly clamped and cut and the infant was handed off to awaiting neonatology team.  The placenta was then allowed to spontaneously deliver. The uterus was cleared of clots and debris.  The hysterotomy incision was reapproximated with 1-0 Vicryl suture in a running locked fashion.  Hemostasis was noted.  The tubes and ovaries were examined and noted to be normal.  The pelvis was irrigated with normal saline. The pericolic gutters were examined and any blood clots were removed.  The Mariano retractor was removed. The peritoneum was reapproximated with 3-0 Vicryl suture running.  The rectus muscles were examined and hemostatic.  The fascia was reapproximated with 0 Vicryl suture running.  The subcutaneous fat was irrigated and any small bleeders were bovied for hemostasis.  The subcutaneous fat was then reapproximated with 3-0 Vicryl suture running in 2 layers.  The skin was reapproximated with 4-0 Monocryl suture running. Steri strips and a Mepilex dressing were placed.  Sponge, needle, instrument, and lap counts were correct x2.  Patient tolerated the procedure well and went to recovery room in stable condition.            ____________________________________     Elisabeth Constantino MD

## 2020-07-09 NOTE — ANESTHESIA PROCEDURE NOTES
Spinal Block    Date/Time: 7/9/2020 3:35 PM  Performed by: Karl Guerrero M.D.  Authorized by: Karl Guerrero M.D.     Patient Location:  OR  Start Time:  7/9/2020 3:35 PM  End Time:  7/9/2020 3:37 PM  Reason for Block: primary anesthetic    patient identified, IV checked, site marked, risks and benefits discussed, surgical consent, monitors and equipment checked, pre-op evaluation and timeout performed    Patient Position:  Sitting  Prep: ChloraPrep    Monitoring:  Continuous pulse oximetry, heart rate, blood pressure and cardiac monitor  Approach:  Midline  Location:  L4-5  Injection Technique:  Single-shot  Skin infiltration:  Lidocaine  Strength:  1%  Dose:  5ml  Needle Type:  Farhad  Needle Gauge:  25 G  CSF flowing pre/post injection:  Yes  Sensory Level:  T4

## 2020-07-09 NOTE — H&P
"Labor and Delivery History and Physical    CC: Presents for scheduled     HPI: 34yo  at 40w0d, EDC 2020. Patient presents to L&D for scheduled repeat . She was hoping for a vaginal birth but her cervix has remained closed and she has not gone into labor. Patient is doing well today and has no complaints. No VB or LOF. Occas contractions. Good fetal movements. Her pregnancy has overall been uncomplicated. She had 1 day with elevated blood pressures at office but had a normal PIH workup and pressures have been normal since.     All other systems were reviewed and were negative.    PMH: healthy female  PSH: breast augmentation, D&C,  section, LEEP, wisdom teeth removal  OB HX: 2018 - 37wk primary  for breech/PIH/IUGR/oligo, 1 SAB, 2 TAB  Gyn Hx: lichen sclerosus of vulva, hx abnl pap tx w/ LEEP, last pap 2019 was normal, remote hx chlamydia  SH: no T/E/D,  -  Paul  Meds: PNV, ASA 81mg daily, clobetasol topical ointment prn  Allergies: percocet    /82   Pulse 92   Ht 1.6 m (5' 3\")   Wt 84.4 kg (186 lb)   BMI 32.95 kg/m²     Physical Exam  Gen: NAD, comfortable  Neck: supple, NT  Resp: no distress, normal effort  Abd: gravid, non tender  Ext: no edema    FHT: 125, pos accels, neg decels, moderate variability, reactive NST  Kline: occas contractions  SVE: deferred    Laboratory Evaluation  ABO: AB+/-  H/H: 14.5/42.5  GBS positive  GTT: 148 w/ normal 3hr  Rubella: Immune  HIV: Neg  RPR: NR  Hep sAg: neg  Pap: NILM  GC/CT neg    Assessment:   34yo  at 40w0d  Prior  section x 1 - here for repeat  GBS positive  Category 1 FHR    Plan:  Admit to Pre-OP  NPO  IV fluids  Anesthesia to see  Ancef for surgical prophylaxis  Consent and prep for repeat  section      Elisabeth Constantino M.D.      "

## 2020-07-09 NOTE — OR SURGEON
"Immediate Post OP Note    PreOp Diagnosis: IUP at 40+0 weeks, Prior  section, GBS pos    PostOp Diagnosis: Same    Procedure(s):   SECTION, REPEAT Low Transverse - Wound Class: Clean Contaminated    Surgeon(s):  lEisabeth Constantino M.D.    Assist:  Rodri Moss M.D.    Anesthesiologist/Type of Anesthesia:  Anesthesiologist: Karl Guerrero M.D./Spinal    Surgical Staff:  Circulator: Ani Lee R.N.; Viktoria Modi R.N.  Scrub Person: Jonn Mccallum  L&D Baby  Nurse: Haily Parra R.N.    Specimens removed if any:  * No specimens in log *    Estimated Blood Loss: 500 mL    Findings: viable female infant named \"Navy\" in cephalic presentation, occiput facing posterior, with apgars 8 and 9, weight 8lb 14oz, single loose nuchal cord, clear amniotic fluid. Normal uterus, tubes, and ovaries bilaterally.    Complications: none        2020 4:45 PM Elisabeth Constantino M.D.      "

## 2020-07-09 NOTE — ANESTHESIA PREPROCEDURE EVALUATION
35yoF  @ 40wks PMHx of GERD, HTN here for repeat C/S    Plt 226  Covid neg  Allergies to Percocet      Relevant Problems   No relevant active problems       Physical Exam    Airway   Mallampati: II       Cardiovascular - normal exam     Dental - normal exam           Pulmonary - normal exam     Abdominal - normal exam     Neurological - normal exam                 Anesthesia Plan    ASA 2       Plan - spinal                 Postoperative Plan: Postoperative administration of opioids is intended.    Pertinent diagnostic labs and testing reviewed    Informed Consent:    Anesthetic plan and risks discussed with patient.

## 2020-07-10 LAB
ERYTHROCYTE [DISTWIDTH] IN BLOOD BY AUTOMATED COUNT: 46.5 FL (ref 35.9–50)
HCT VFR BLD AUTO: 37.9 % (ref 37–47)
HGB BLD-MCNC: 12.4 G/DL (ref 12–16)
MCH RBC QN AUTO: 30.3 PG (ref 27–33)
MCHC RBC AUTO-ENTMCNC: 32.7 G/DL (ref 33.6–35)
MCV RBC AUTO: 92.7 FL (ref 81.4–97.8)
PLATELET # BLD AUTO: 183 K/UL (ref 164–446)
PMV BLD AUTO: 9.6 FL (ref 9–12.9)
RBC # BLD AUTO: 4.09 M/UL (ref 4.2–5.4)
WBC # BLD AUTO: 10.5 K/UL (ref 4.8–10.8)

## 2020-07-10 PROCEDURE — A9270 NON-COVERED ITEM OR SERVICE: HCPCS | Performed by: OBSTETRICS & GYNECOLOGY

## 2020-07-10 PROCEDURE — 36415 COLL VENOUS BLD VENIPUNCTURE: CPT

## 2020-07-10 PROCEDURE — A9270 NON-COVERED ITEM OR SERVICE: HCPCS | Performed by: ANESTHESIOLOGY

## 2020-07-10 PROCEDURE — 85027 COMPLETE CBC AUTOMATED: CPT

## 2020-07-10 PROCEDURE — 700102 HCHG RX REV CODE 250 W/ 637 OVERRIDE(OP): Performed by: OBSTETRICS & GYNECOLOGY

## 2020-07-10 PROCEDURE — 700102 HCHG RX REV CODE 250 W/ 637 OVERRIDE(OP): Performed by: ANESTHESIOLOGY

## 2020-07-10 PROCEDURE — 700111 HCHG RX REV CODE 636 W/ 250 OVERRIDE (IP): Performed by: ANESTHESIOLOGY

## 2020-07-10 PROCEDURE — 700112 HCHG RX REV CODE 229: Performed by: OBSTETRICS & GYNECOLOGY

## 2020-07-10 PROCEDURE — 770002 HCHG ROOM/CARE - OB PRIVATE (112)

## 2020-07-10 RX ORDER — ONDANSETRON 4 MG/1
4 TABLET, ORALLY DISINTEGRATING ORAL EVERY 6 HOURS PRN
Status: DISCONTINUED | OUTPATIENT
Start: 2020-07-10 | End: 2020-07-11 | Stop reason: HOSPADM

## 2020-07-10 RX ORDER — ONDANSETRON 2 MG/ML
4 INJECTION INTRAMUSCULAR; INTRAVENOUS EVERY 6 HOURS PRN
Status: DISCONTINUED | OUTPATIENT
Start: 2020-07-10 | End: 2020-07-11 | Stop reason: HOSPADM

## 2020-07-10 RX ORDER — SERTRALINE HYDROCHLORIDE 25 MG/1
25 TABLET, FILM COATED ORAL DAILY
Qty: 30 TAB | Refills: 0 | Status: CANCELLED | OUTPATIENT
Start: 2020-07-11 | End: 2020-08-10

## 2020-07-10 RX ORDER — HYDROCODONE BITARTRATE AND ACETAMINOPHEN 5; 325 MG/1; MG/1
1 TABLET ORAL EVERY 4 HOURS PRN
Status: DISCONTINUED | OUTPATIENT
Start: 2020-07-10 | End: 2020-07-11 | Stop reason: HOSPADM

## 2020-07-10 RX ORDER — MORPHINE SULFATE 4 MG/ML
4 INJECTION, SOLUTION INTRAMUSCULAR; INTRAVENOUS
Status: DISCONTINUED | OUTPATIENT
Start: 2020-07-10 | End: 2020-07-11 | Stop reason: HOSPADM

## 2020-07-10 RX ORDER — KETOROLAC TROMETHAMINE 30 MG/ML
30 INJECTION, SOLUTION INTRAMUSCULAR; INTRAVENOUS EVERY 6 HOURS
Status: DISCONTINUED | OUTPATIENT
Start: 2020-07-10 | End: 2020-07-10

## 2020-07-10 RX ORDER — DIPHENHYDRAMINE HCL 25 MG
25 TABLET ORAL EVERY 6 HOURS PRN
Status: DISCONTINUED | OUTPATIENT
Start: 2020-07-10 | End: 2020-07-11 | Stop reason: HOSPADM

## 2020-07-10 RX ORDER — HYDROCODONE BITARTRATE AND ACETAMINOPHEN 10; 325 MG/1; MG/1
1 TABLET ORAL EVERY 4 HOURS PRN
Status: DISCONTINUED | OUTPATIENT
Start: 2020-07-10 | End: 2020-07-11 | Stop reason: HOSPADM

## 2020-07-10 RX ORDER — IBUPROFEN 800 MG/1
800 TABLET ORAL EVERY 8 HOURS PRN
Status: DISCONTINUED | OUTPATIENT
Start: 2020-07-10 | End: 2020-07-11 | Stop reason: HOSPADM

## 2020-07-10 RX ORDER — DIPHENHYDRAMINE HYDROCHLORIDE 50 MG/ML
25 INJECTION INTRAMUSCULAR; INTRAVENOUS EVERY 6 HOURS PRN
Status: DISCONTINUED | OUTPATIENT
Start: 2020-07-10 | End: 2020-07-11 | Stop reason: HOSPADM

## 2020-07-10 RX ADMIN — DOCUSATE SODIUM 100 MG: 100 CAPSULE, LIQUID FILLED ORAL at 06:33

## 2020-07-10 RX ADMIN — IBUPROFEN 800 MG: 800 TABLET, FILM COATED ORAL at 14:39

## 2020-07-10 RX ADMIN — SIMETHICONE 80 MG: 80 TABLET, CHEWABLE ORAL at 14:45

## 2020-07-10 RX ADMIN — HYDROCODONE BITARTRATE AND ACETAMINOPHEN 1 TABLET: 5; 325 TABLET ORAL at 22:08

## 2020-07-10 RX ADMIN — KETOROLAC TROMETHAMINE 30 MG: 30 INJECTION, SOLUTION INTRAMUSCULAR at 09:38

## 2020-07-10 RX ADMIN — ACETAMINOPHEN 1000 MG: 500 TABLET ORAL at 00:16

## 2020-07-10 RX ADMIN — HYDROCODONE BITARTRATE AND ACETAMINOPHEN 1 TABLET: 10; 325 TABLET ORAL at 14:38

## 2020-07-10 RX ADMIN — SERTRALINE 25 MG: 50 TABLET, FILM COATED ORAL at 07:19

## 2020-07-10 RX ADMIN — ACETAMINOPHEN 1000 MG: 500 TABLET ORAL at 06:33

## 2020-07-10 RX ADMIN — KETOROLAC TROMETHAMINE 30 MG: 30 INJECTION, SOLUTION INTRAMUSCULAR at 04:25

## 2020-07-10 RX ADMIN — PRENATAL WITH FERROUS FUM AND FOLIC ACID 1 TABLET: 3080; 920; 120; 400; 22; 1.84; 3; 20; 10; 1; 12; 200; 27; 25; 2 TABLET ORAL at 06:33

## 2020-07-10 ASSESSMENT — PAIN SCALES - GENERAL: PAIN_LEVEL: 0

## 2020-07-10 NOTE — LACTATION NOTE
This note was copied from a baby's chart.  Baby 40 weeks, , Baby was in NBN yesterday (MOB was pumping)- now rooming-in. Mother reports she pumped & provided with 1st baby for 6 months, baby had a difficult time latching. Mother very please this baby is latching and breastfeeding well, mother independently latching. Baby at breast, mother burped baby then re-latched on left breast-  assisted with positioning for deeper latch- see latch assessment score. Mother states she already knows how to hand express, encouraged mother to hand express & spoon feed back in addition to breastfeeding.     Teaching on hunger cues, breastfeeding when baby shows cues or by 4 hours from last feed, importance of skin to skin, positioning baby at breast & cluster feeding.     Mother has P personal pump paperwork given. Contact information for OP lactation & Zoom given.     Breastfeeding plan:  Breastfeed on cue a minimum of 8x/24 hours or by 4 hours from last feed, hand express & spoon feed back in addition to breastfeeding.

## 2020-07-10 NOTE — ANESTHESIA POSTPROCEDURE EVALUATION
Patient: Tamiko Hilton    Procedure Summary     Date:  20 Room / Location:  [facility] OR 01 / LABOR AND DELIVERY    Anesthesia Start:  1533 Anesthesia Stop:  1643    Procedure:   SECTION, REPEAT (N/A Abdomen) Diagnosis:        delivery delivered      (same, del)    Surgeon:  Elisabeth Constantino M.D. Responsible Provider:  Karl Guerrero M.D.    Anesthesia Type:  spinal ASA Status:  2          Final Anesthesia Type: spinal  Last vitals  BP   Blood Pressure: 113/68    Temp   35.9 °C (96.7 °F)    Pulse   Pulse: 72   Resp   18    SpO2   96 %      Anesthesia Post Evaluation    Patient location during evaluation: PACU  Patient participation: complete - patient participated  Level of consciousness: awake and alert  Pain score: 0    Airway patency: patent  Anesthetic complications: no  Cardiovascular status: adequate and hemodynamically stable  Respiratory status: acceptable  Hydration status: acceptable    PONV: none           Nurse Pain Score: 5 (NPRS)

## 2020-07-10 NOTE — CARE PLAN
Problem: Communication  Goal: The ability to communicate needs accurately and effectively will improve  Outcome: PROGRESSING AS EXPECTED  Note:   Plan of care reviewed with MOB including provider roles, health history, IV and medications, labs and tests, and discharge planning. Patient REMINDED that they may ask questions at any time and should always let staff know if they are having difficulty breathing, pain or any discomfort at any time     Problem: Altered physiologic condition related to postoperative  delivery  Goal: Patient physiologically stable as evidenced by normal lochia, palpable uterine involution and vital signs within normal limits  Outcome: PROGRESSING AS EXPECTED  Note: Fundus firm at U, with light bleeding, oxytocin running at 125 ml/hr

## 2020-07-10 NOTE — PROGRESS NOTES
Assumed care of patient. Assessment complete. Fundus is firm, at the umbilicus, with light lochia rubra. Pain level is low, at a 3/10, denies pain medication at this time. Bed is locked and in low position. Call light left within reach and encouraged to call for any needs if necessary.

## 2020-07-10 NOTE — PROGRESS NOTES
S:  Pt doing well, no c/o, decreased vaginal bleeding, pain controlled, passing gas, baby doing well, breast feeding, martinez out and voiding    O:  VSS AF        Lung - CTAB        CV - RRR        Abd - approp mild TTP, no peritoneal signs, wound= C/D/I, no s/s infection        Ext - No s/s DVT, nontender         Labs: Hgb 12.2    A:  POD#1 s/p repeat  at 40wks - doing well postpartum  Breast feeding well         P:  Continue routine post partum care, d/c home on POD#2 per pt request

## 2020-07-10 NOTE — ANESTHESIA TIME REPORT
Anesthesia Start and Stop Event Times     Date Time Event    2020 1432 Ready for Procedure     1533 Anesthesia Start     1643 Anesthesia Stop        Responsible Staff  20    Name Role Begin End    Karl Guerrero M.D. Anesth 1533 1643        Preop Diagnosis (Free Text):  Pre-op Diagnosis     PREVIOUS , PREGNANCY INDUCED HYPERTENSION, 40 WEEKS        Preop Diagnosis (Codes):  Diagnosis Information     Diagnosis Code(s):  delivery delivered [O82]        Post op Diagnosis  Pregnancy      Premium Reason  K. Alert    Comments:

## 2020-07-10 NOTE — CARE PLAN
Problem: Potential for postpartum infection related to surgical incision, compromised uterine condition, urinary tract or respiratory compromise  Goal: Patient will be afebrile and free from signs and symptoms of infection  Outcome: PROGRESSING AS EXPECTED  Note: No s/s of infection present on assessment. Pt afebrile.      Problem: Alteration in comfort related to surgical incision and/or after birth pains  Goal: Patient is able to ambulate, care for self and infant with acceptable pain level  Outcome: PROGRESSING AS EXPECTED  Note: Pt has not been able to ambulate due to bedrest from . Pt states she is ready to ambulate as soon as she can get up. Pain is tolerable, controlled with medications and rest. Pain is more of a discomfort per pt.

## 2020-07-10 NOTE — CARE PLAN
Problem: Communication  Goal: The ability to communicate needs accurately and effectively will improve  Outcome: MET  Note:   Plan of care reviewed including provider roles, health history, IV and medications, labs and tests, and discharge planning. Patients assured that they may ask questions at any time and should always let staff know if they are having difficulty breathing, pain or any discomfort at any time.     Problem: Safety  Goal: Will remain free from injury  Outcome: MET  Note: Pt is steady on her feet  Goal: Will remain free from falls  Outcome: MET     Problem: Safety  Goal: Will remain free from injury  Outcome: MET  Note: Pt is steady on her feet     Problem: Safety  Goal: Will remain free from falls  Outcome: MET

## 2020-07-10 NOTE — PROGRESS NOTES
Bedside report received from Viktoria (L&D RN); patient oriented to room including emergency/regular use of call light, when to call RN, and plan of care for the rest of the shift. Fundus firm at U with light bleeding, oxytocin running at 125 ml/hr and will continue to monitor

## 2020-07-10 NOTE — ANESTHESIA QCDR
2019 [facility] Clinical Data Registry (for Quality Improvement)     Postoperative nausea/vomiting risk protocol (Adult = 18 yrs and Pediatric 3-17 yrs)- (430 and 463)  General inhalation anesthetic (NOT TIVA) with PONV risk factors: No  Provision of anti-emetic therapy with at least 2 different classes of agents: N/A  Patient DID NOT receive anti-emetic therapy and reason is documented in Medical Record: N/A    Multimodal Pain Management- (477)  Non-emergent surgery AND patient age >= 18: No  Use of Multimodal Pain Management, two or more drugs and/or interventions, NOT including systemic opioids:   Exception: Documented allergy to multiple classes of analgesics:     Smoking Abstinence (404)  Patient is current smoker (cigarette, pipe, e-cig, marijuanna): No  Elective Surgery:   Abstinence instructions provided prior to day of surgery:   Patient abstained from smoking on day of surgery:     Pre-Op Beta-Blocker in Isolated CABG (44)  Isolated CABG AND patient age >= 18: No  Beta-blocker admin within 24 hours of surgical incision:   Exception:of medical reason(s) for not administering beta blocker within 24 hours prior to surgical incision (e.g., not  indicated,other medical reason):     PACU assessment of acute postoperative pain prior to Anesthesia Care End- Applies to Patients Age = 18- (ABG7)  Initial PACU pain score is which of the following: < 7/10  Patient unable to report pain score: N/A    Post-anesthetic transfer of care checklist/protocol to PACU/ICU- (426 and 427)  Upon conclusion of case, patient transferred to which of the following locations: PACU/Non-ICU  Use of transfer checklist/protocol: Yes  Exclusion: Service Performed in Patient Hospital Room (and thus did not require transfer): N/A  Unplanned admission to ICU related to anesthesia service up through end of PACU care- (MD51)  Unplanned admission to ICU (not initially anticipated at anesthesia start time): No

## 2020-07-11 VITALS
OXYGEN SATURATION: 98 % | RESPIRATION RATE: 16 BRPM | TEMPERATURE: 98.5 F | HEIGHT: 63 IN | HEART RATE: 71 BPM | BODY MASS INDEX: 32.96 KG/M2 | WEIGHT: 186 LBS | SYSTOLIC BLOOD PRESSURE: 119 MMHG | DIASTOLIC BLOOD PRESSURE: 68 MMHG

## 2020-07-11 PROCEDURE — 700102 HCHG RX REV CODE 250 W/ 637 OVERRIDE(OP): Performed by: OBSTETRICS & GYNECOLOGY

## 2020-07-11 PROCEDURE — 700112 HCHG RX REV CODE 229: Performed by: OBSTETRICS & GYNECOLOGY

## 2020-07-11 PROCEDURE — A9270 NON-COVERED ITEM OR SERVICE: HCPCS | Performed by: OBSTETRICS & GYNECOLOGY

## 2020-07-11 RX ORDER — IBUPROFEN 800 MG/1
600 TABLET ORAL EVERY 6 HOURS
Qty: 28 TAB | Refills: 0 | Status: SHIPPED | OUTPATIENT
Start: 2020-07-11 | End: 2020-07-18

## 2020-07-11 RX ORDER — HYDROCODONE BITARTRATE AND ACETAMINOPHEN 5; 325 MG/1; MG/1
1 TABLET ORAL EVERY 8 HOURS PRN
Qty: 21 TAB | Refills: 0 | Status: SHIPPED | OUTPATIENT
Start: 2020-07-11 | End: 2020-07-18

## 2020-07-11 RX ORDER — PSEUDOEPHEDRINE HCL 30 MG
100 TABLET ORAL 2 TIMES DAILY PRN
Qty: 60 CAP | Refills: 0 | Status: SHIPPED | OUTPATIENT
Start: 2020-07-11 | End: 2020-08-10

## 2020-07-11 RX ADMIN — IBUPROFEN 800 MG: 800 TABLET, FILM COATED ORAL at 10:14

## 2020-07-11 RX ADMIN — DOCUSATE SODIUM 100 MG: 100 CAPSULE, LIQUID FILLED ORAL at 07:05

## 2020-07-11 RX ADMIN — SERTRALINE 25 MG: 50 TABLET, FILM COATED ORAL at 08:00

## 2020-07-11 RX ADMIN — HYDROCODONE BITARTRATE AND ACETAMINOPHEN 1 TABLET: 5; 325 TABLET ORAL at 08:01

## 2020-07-11 RX ADMIN — IBUPROFEN 800 MG: 800 TABLET, FILM COATED ORAL at 01:17

## 2020-07-11 RX ADMIN — PRENATAL WITH FERROUS FUM AND FOLIC ACID 1 TABLET: 3080; 920; 120; 400; 22; 1.84; 3; 20; 10; 1; 12; 200; 27; 25; 2 TABLET ORAL at 07:05

## 2020-07-11 RX ADMIN — HYDROCODONE BITARTRATE AND ACETAMINOPHEN 1 TABLET: 5; 325 TABLET ORAL at 04:11

## 2020-07-11 ASSESSMENT — EDINBURGH POSTNATAL DEPRESSION SCALE (EPDS)
I HAVE BLAMED MYSELF UNNECESSARILY WHEN THINGS WENT WRONG: YES, SOME OF THE TIME
I HAVE FELT SCARED OR PANICKY FOR NO GOOD REASON: NO, NOT MUCH
I HAVE FELT SAD OR MISERABLE: NO, NOT AT ALL
I HAVE BEEN SO UNHAPPY THAT I HAVE HAD DIFFICULTY SLEEPING: NOT AT ALL
I HAVE BEEN SO UNHAPPY THAT I HAVE BEEN CRYING: NO, NEVER
I HAVE BEEN ANXIOUS OR WORRIED FOR NO GOOD REASON: YES, SOMETIMES
THE THOUGHT OF HARMING MYSELF HAS OCCURRED TO ME: NEVER
THINGS HAVE BEEN GETTING ON TOP OF ME: NO, MOST OF THE TIME I HAVE COPED QUITE WELL
I HAVE LOOKED FORWARD WITH ENJOYMENT TO THINGS: AS MUCH AS I EVER DID
I HAVE BEEN ABLE TO LAUGH AND SEE THE FUNNY SIDE OF THINGS: AS MUCH AS I ALWAYS COULD

## 2020-07-11 NOTE — DISCHARGE PLANNING
Discharge Planning Assessment Post Partum     Reason for Referral: History of post partum depression  Address: [address] [place], [place] [address] [place] [id number]  Phone: [phone]  Type of Living Situation: living with FOB  Mom Diagnosis: Pregnancy,   Baby Diagnosis: [place]-40 weeks  Primary Language: English     Father of the Baby: Paul Hilton   Involved in baby’s care? Yes  Contact Information: [phone]     Prenatal Care: Yes  Mom's PCP: None  PCP for new baby: Dr. Hoffman     Support System: FOB  Coping/Bonding between mother & baby: Yes  Source of Feeding: breast feeding  Supplies for Infant: prepared for infant; denies any needs     Mom's Insurance: [employer]  Baby Covered on Insurance:Yes  Mother Employed/School: self-employed  Other children in the home/names & ages: 2 year old daughter-Faustina     Financial Hardship/Income: denies   Mom's Mental status: alert and oriented  Services used prior to admit: none     CPS History: No  Psychiatric History: history of post partum depression.  MOB states she is concerned about having PP depression because it was so bad with her first baby.  She has talked to her OB and was started on a low dose of Zoloft 25 mg which she has been taking for a couple of weeks.  Provided MOB with a list of counseling resources that specialize in post partum depression.  Domestic Violence History: No  Drug/ETOH History: No     Resources Provided: post partum support and counseling resources provided  Referrals Made: none      Clearance for Discharge: Infant is cleared to discharge home with parents.

## 2020-07-11 NOTE — DISCHARGE INSTRUCTIONS
POSTPARTUM DISCHARGE INSTRUCTIONS FOR MOM    YOB: 1985   Age: 35 y.o.               Admit Date: 2020     Discharge Date: 2020  Attending Doctor:  Elisabeth Constantino M.D.                  Allergies:  Percocet [oxycodone-acetaminophen]    Discharged to home by car. Discharged via wheelchair, hospital escort: Yes.  Special equipment needed: Not Applicable  Belongings with: Personal  Be sure to schedule a follow-up appointment with your primary care doctor or any specialists as instructed.     Discharge Plan:   Diet Plan: Discussed  Activity Level: Discussed  Confirmed Follow up Appointment: Patient to Call and Schedule Appointment  Confirmed Symptoms Management: Discussed  Medication Reconciliation Updated: Yes    REASONS TO CALL YOUR OBSTETRICIAN:  1.   Persistent fever or shaking chills (Temperature higher than 100.4)  2.   Heavy bleeding (soaking more than 1 pad per hour); Passing clots  3.   Foul odor from vagina  4.   Mastitis (Breast infection; breast pain, chills, fever, redness)  5.   Urinary pain, burning or frequency  6.   Abdominal incision infection  7.   Severe depression longer than 24 hours    HAND WASHING  · Prior to handling the baby.  · Before breastfeeding or bottle feeding baby.  · After using the bathroom or changing the baby's diaper.    WOUND CARE  Ask your physician for additional care instructions.  In general:    ·  Incision:      · Keep clean and dry.    · Do NOT lift anything heavier than your baby for up to 6 weeks.    · There should not be any opening or pus.      VAGINAL CARE  · Nothing inside vagina for 6 weeks: no sexual intercourse, tampons or douching.  · Bleeding may continue for 2-4 weeks.  Amount may vary.    · Call your physician for heavy bleeding which means soaking more than 1 pad per hour    BIRTH CONTROL  · It is possible to become pregnant at any time after delivery and while breastfeeding.  · Plan to discuss a method of birth control with your  "physician at your follow up visit. visit.    DIET AND ELIMINATION  · Eating more fiber (bran cereal, fruits, and vegetables) and drinking plenty of fluids will help to avoid constipation.  · Urinary frequency after childbirth is normal.    POSTPARTUM BLUES  During the first few days after birth, you may experience a sense of the \"blues\" which may include impatience, irritability or even crying.  These feeling come and go quickly.  However, as many as 1 in 10 women experience emotional symptoms known as postpartum depression.    Postpartum depression:  May start as early as the second or third day after delivery or take several weeks or months to develop.  Symptoms of \"blues\" are present, but are more intense:  Crying spells; loss of appetite; feelings of hopelessness or loss of control; fear of touching the baby; over concern or no concern at all about the baby; little or no concern about your own appearance/caring for yourself; and/or inability to sleep or excessive sleeping.  Contact your physician if you are experiencing any of these symptoms.    Crisis Hotline:  · [place] Crisis Hotline:  6-521-ZPKDNYO  Or 1-113.631.3294  · [place] Crisis Hotline:  1-844.720.8824  Or 114-117-1135    PREVENTING SHAKEN BABY:  If you are angry or stressed, PUT THE BABY IN THE CRIB, step away, take some deep breaths, and wait until you are calm to care for the baby.  DO NOT SHAKE THE BABY.  You are not alone, call a supporter for help.    · Crisis Call Center 24/7 crisis line 358-339-4093 or 1-494.715.9539  · You can also text them, text \"ANSWER\" to 054456    QUIT SMOKING/TOBACCO USE:  I understand the use of any tobacco products increases my chance of suffering from future heart disease and could cause other illnesses which may shorten my life. Quitting the use of tobacco products is the single most important thing I can do to improve my health. For further information on smoking / tobacco cessation call a Toll Free Quit Line at " 1-454.309.3426 (*National Cancer [place]) or 1-997.682.6606 (American Lung Association) or you can access the web based program at www.lungusa.org.    · [place] Tobacco Users Help Line:  (180) 776-9511       Toll Free: 1-120.281.4466  · Quit Tobacco Program [facility] (269)692-3324    DEPRESSION / SUICIDE RISK:  As you are discharged from this [facility], it is important to learn how to keep safe from harming yourself.    Recognize the warning signs:  · Abrupt changes in personality, positive or negative- including increase in energy   · Giving away possessions  · Change in eating patterns- significant weight changes-  positive or negative  · Change in sleeping patterns- unable to sleep or sleeping all the time   · Unwillingness or inability to communicate  · Depression  · Unusual sadness, discouragement and loneliness  · Talk of wanting to die  · Neglect of personal appearance   · Rebelliousness- reckless behavior  · Withdrawal from people/activities they love  · Confusion- inability to concentrate     If you or a loved one observes any of these behaviors or has concerns about self-harm, here's what you can do:  · Talk about it- your feelings and reasons for harming yourself  · Remove any means that you might use to hurt yourself (examples: pills, rope, extension cords, firearm)  · Get professional help from the community (Mental Health, Substance Abuse, psychological counseling)  · Do not be alone:Call your Safe Contact- someone whom you trust who will be there for you.  · Call your local CRISIS HOTLINE 358-2566 or 694-477-7845  · Call your local Children's Mobile Crisis Response Team [place] (059) 409-0734 or www.CloudTags  · Call the toll free National Suicide Prevention Hotlines   · National Suicide Prevention Lifeline 974-300-QWNG (1283)  · National Hope Line Network 800-SUICIDE (966-9864)    DISCHARGE SURVEY:  Thank you for choosing [facility].  We hope we  provided you with very good care.  You may be receiving a survey in the mail.  Please fill it out.  Your opinion is valuable to us.        My signature on this form indicates that:  1.  I have reviewed and understand the above information  2.  My questions regarding this information have been answered to my satisfaction.  3.  I have formulated a plan with my discharge nurse to obtain my prescribed medication for home.

## 2020-07-11 NOTE — LACTATION NOTE
This note was copied from a baby's chart.  Follow-up visit, mother has breast implants, couplet to be discharged today. Baby cluster fed last night, mother reports breasts feeling full. Mother denies nipple pain or damage with latches, latch not seen at this time. Mother states baby is latching and breastfeeding well. Discussed nipple care, hand expressing colostrum/EBM then rubbing on nipples and allowing to air dry.    Questions answered, mother states she feels confident going home and feeding baby.     Breastfeeding plan:  Breastfeed on cue a minimum of 8x/24 hours or by 4 hours from last feed. F/U with [facility] for OP lactation support.

## 2020-07-11 NOTE — DISCHARGE SUMMARY
Obstetrics Discharge Summary    Admission Date: 2020         Discharge Date: 2020    ADMISSION DIAGNOSIS:  1. Term intrauterine pregnancy at 40+0 weeks  2. History of  section x 1  3. GBS positive  4. Gestational hypertension    DISCHARGE DIAGNOSIS:  1. Term intrauterine pregnancy at 40+0 weeks  2. History of  section x 1  3. GBS positive  4. Gestational hypertension    DETAILS OF HOSPITAL STAY  Presenting Problem/History of Present Illness: Scheduled repeat  section.     Hospital Course:  Patient is a 35 y.o.  who presented to [facility] at 40w0d weeks for a scheduled repeat  section. She had prenatal care with [facility] with Dr. Constantino. Pregnancy was complicated by history of one prior  section and GBS positive status.     Patient had a history of one prior  section and desired a repeat  section. For full details of the operation, please refer to the operative report dictation. Briefly, the patient was taken to the operating room where a repeat  section was performed. Bilateral salpingectomy was not preformed. There were no complications. Estimated blood loss was 500 ml. Findings included normal uterus, tubes, and ovaries bilaterally . Patient delivered a viable female infant (Baby homer Casas) weighing 4,045g with Apgars as below in delivery summary. Patent’s recovery and post-operative courses were unremarkable. By post-operative day #2, the patient met all appropriate milestones and was stable to be discharged to home.    APGARs:   8   9      COMPLICATIONS: none    PHYSICAL EXAM:  Vitals:   Vitals:    20 0600   BP: 119/68   Pulse: 81   Resp: 17   Temp: 36.9 °C (98.5 °F)   SpO2: 94%     General: Alert, conversational, pleasant, no acute distress  CVS: Regular rate  PULM: No respiratory distress, symmetric expansion  ABD: Soft, non-tender, non-distended, fundus firm, non-tender, below the umbilicus, incision dressing  clean, dry and intact, no surrounding erythema or drainage  : Deferred  Extremities: Moves all, trace edema     LABS/STUDIES:   Results for SONAM GARDUNO (MRN [id number]) as of 7/10/2020 21:09   Ref. Range 7/9/2020 14:45 7/10/2020 00:02   WBC Latest Ref Range: 4.8 - 10.8 K/uL 11.4 (H) 10.5   RBC Latest Ref Range: 4.20 - 5.40 M/uL 4.72 4.09 (L)   Hemoglobin Latest Ref Range: 12.0 - 16.0 g/dL 14.5 12.4   Hematocrit Latest Ref Range: 37.0 - 47.0 % 42.5 37.9   MCV Latest Ref Range: 81.4 - 97.8 fL 90.0 92.7   MCH Latest Ref Range: 27.0 - 33.0 pg 30.7 30.3   MCHC Latest Ref Range: 33.6 - 35.0 g/dL 34.1 32.7 (L)   RDW Latest Ref Range: 35.9 - 50.0 fL 45.0 46.5   Platelet Count Latest Ref Range: 164 - 446 K/uL 226 183   MPV Latest Ref Range: 9.0 - 12.9 fL 9.7 9.6     DISPOSITION: Home.    DISCHARGE MEDICATIONS:  - Oxycodone 5 mg PO every 8 hours prn pain x 7 days  - Tylenol 650 mg PO every 6 hours x 7 day  - Ibuprofen 650 mg PO every 6 hours x 7 day  - Colace 100mg PO BID x 30 days    DISCHARGE INSTRUCTIONS:  1. Pelvic rest and no heavy lifting greater than 10 pounds for 6 weeks post-operatively.   2. No driving for at least 2 weeks or longer while requiring pain medication.   3. Incision check in [facility] week at [facility] [phone]  4. Post-partum vist in 6 weeks at [facility] [phone]  5. Return to the emergency department if experiencing increased vaginal bleeding, severe pain, temperature greater than 100.4, or any other concerns.    DISCHARGE CONDITION: Stable.    Arleth Sanders M.D.

## 2020-07-11 NOTE — CARE PLAN
Problem: Infection  Goal: Will remain free from infection  Outcome: PROGRESSING AS EXPECTED  Note: Patient is ambulating without assistance and maintaining a balanced diet. Patient is able to reposition by self and reducing risk of infection. Incision is clean, dry, and intact. Patient is progressing as expected at this time.      Problem: Potential knowledge deficit related to lack of understanding of self and  care  Goal: Patient will demonstrate ability to care for self and infant  Outcome: PROGRESSING AS EXPECTED  Note: Patient is able to get up and move around. Patient demonstrates ability to provide care for herself and for infant. Patient feeding at scheduled times and assisting with infant care. Patient is progressing as expected.        Received.   Scanning into her chart now: CBC, A1c, CMP

## 2020-07-11 NOTE — PROGRESS NOTES
0700: Assumed care of patient. Patient resting comfortably, no needs at this time.    0800: Assessment complete. Reviewed discharge process for the day including orders, paperwork, and testing for infant.    1030: Social work cleared patient to discharge.    1130: Discharge teaching reviewed with D/c RN Desmond. No questions at this time.    1130: Report given to Miryam JACKSON. Patient updated that pediatrician orders are still needed.

## 2020-07-11 NOTE — PROGRESS NOTES
Received report from Savannah JACKSON. Assumed patient care. Patient assessed and VSS. Patient rated pain 2. Breasts soft, fundus firm, lochia light. Legs show sign of swelling but no pitting. All questions and concerns answered at this time. Will continue to monitor. Bed rails up x 2, call light within reach.

## 2020-07-30 ENCOUNTER — OFFICE VISIT (OUTPATIENT)
Dept: OBGYN | Facility: CLINIC | Age: 35
End: 2020-07-30
Payer: COMMERCIAL

## 2020-07-30 DIAGNOSIS — Z91.89 AT RISK FOR POSTPARTUM DEPRESSION: ICD-10-CM

## 2020-07-30 DIAGNOSIS — O92.79 LACTATION DISORDER, POSTPARTUM CONDITION: ICD-10-CM

## 2020-07-30 DIAGNOSIS — O92.29 SORE NIPPLES DUE TO LACTATION: ICD-10-CM

## 2020-07-30 PROCEDURE — 99354 PR PROLONGED SVC OUTPATIENT SETTING 1ST HOUR: CPT | Performed by: NURSE PRACTITIONER

## 2020-07-30 PROCEDURE — 99205 OFFICE O/P NEW HI 60 MIN: CPT | Performed by: NURSE PRACTITIONER

## 2020-07-30 NOTE — PROGRESS NOTES
"Summary:Difficulty with first baby, making more milk this time and baby latches better but is slow and steady, not removing full volumes. Mom diligently pumps and has sufficient supply at this time. Baby is learning nursing and can remove 1/2 the milk more efficiently than credited.  Will reduce time pumping and at breast and on bottle to not tire baby (and mom). Acupuncture for supply, Chiro eval for high compression.    Subjective:   Tamiko Hilton is a 35 y.o. female here to establish lactation care. She is here today with baby girl .    Concerns:   Latch on difficulties , nipple pain , feeling that there is not enough milk  and sleepy baby  HPI:   Pertinent  history: c/section repeat with pp swelling  Mother does not have a history of advanced maternal age, GDM, hypertension prior to pregnancy, insulin resistance, multiple gestation, PCOS and thyroid disease. These are common conditions which may interfere in establishing milk supply.    Mother does have history of anxiety. This is a  common conditions which may interfere in establishing milk supply.      Breast changes in pregnancy: Yes  History of breast surgeries: Yes Augmentation    FEEDING HISTORY:    Past breastfeeding history:  First baby pumped for 4 months  Hospital course: Uneventful, latched, seemed ok  Currently: Seen by lactation in [place], set up pumping plan and breastfeeding, topping off with bottle.  Not sustainable. Was pumping 13x/day, decreased to 11-12x, stopped breast at night for her sanity, pumps and bottle feeds at that time. Managing [age] year old. \"Lazy\" at the breast, 20 minutes each side then 20 min bottle.   Both breasts: Yes  Bottle feeds: tops off each feeding /24h      Supplement: Expressed breast milk  Quantity: all pumped milk  How given/devices:  Bottle    Nipple Shield Use: None    Breast Pumping:   Hospital pump/ Has spectra also  Frequency: 13x/24 hours  Type of pump: Plantinum  Flange size/type: 25mm  Pain " with pumping  31% suction 60 speed    ROS:  Constitutional:   no fever, chills. Feeling well  Extremities Swelling: No extremity swelling. Severe swelling PP  Gastrointestinal:  Negative for nausea, vomiting  Breasts: No soreness of breasts and Soreness of nipples  Psychiatric: Feels exhausted, naps when toddler naps  Mental Health:  No mention of feeling irritable, agitated, angry, overwhelmed, apathy, exhaustion nor having sleep changes outside infant feeds/demands or appetite changes       Objective:     General: no acute distress  Neurological:  Alert and oriented x3  Breasts: Symetrical , Soft, Plugged Duct - no evidence and Mastitis  - no S/S  Nipples: intact and erythematous  Psychiatric:  Normal mood and affect. Her behavior is normal. Judgment and thought content normal   Mental Health:  Did NOT exhibit sadness, agitation or hypervigilance.              Participation: Active verbal participation and attentive to visit               Grooming:  clean and casual              Mood:  Patient describes  mood as anxious               Affect: Appears congruent with mood, patient appears minimally anxious, she is not agitated or irritable.  She is tearful   Not flat.              Cognition: Cognitive function appears intact with no perceived deficits of short term or long term memory.               Thought process: Wnl              Thought content: Wnl              Speech: Rate within normal limits and Volume within normal limits              Perception: Within normal limits              Insight:  Within normal limits              Judgment: Within normal limits              Family/couple interaction observations: None observed  Aware of her PPD experience last time and started zoloft but makes her sleepy tried 1/2 dose am and pm, stopped the medication.   Referred to Behavioral Health, urgent     Assessment/Plan & Lactation Counseling:     Infant Weight History:  7/9/20 8#14  Lost a pound at first visit, mom HE an  ounce and fed it back   20  9#7.8oz  Infant intake at Breast:: L 9ml     R 21ml +6ml    Total 36ml  Milk Transfer at this feeding:   Effective breastfeeding at abut 50% of available milk  Pumped: Type of Pump: Hospital grade    Quantity Pumped:    Total 25ml  Initiation of Feeding: Infant initiates  Position of Feeding:    Right: football  Left: cross cradle  Attachment Achieved: rapidly  Nipple shield: N/A   Suck Pattern at the breast: Chewing non nutritive with periods of normal ssb patterns. Learning well but has to continue learning  Suck Pattern on the bottle: Suck burst and normal rest, once she had a flow going, continued, took about 12 minutes - WNL  Behavior Following Observed Feeding: sleeping  Nipple Pain from: High vacuum causing nipple strain resulting in damage with pump and infant    Latch: Assisted latch. Mom independent then assisted latch for depth as nipple on right came out creased.   Suckling/Feeding: attaches, baby roots, elicits DAHIANA, frequent pauses, intermittent swallows and rhythmic  Milk Supply Available: normal  Diagnosis/Problem  Lactation disorder, PP condition, latching  Sore nipples due to lactation, creasing right nipple  At risk for PP depression.      Discussed concerns and symptoms as listed above in assessment and guidance summarized below.  Topics reviewed included:  •  Herbs and medications for increasing supply and their potential side effects and efficacy. No evidence base exists to support their use  •  The nature of infants oral head/neck structure and function and its impact on latch and transfer of milk.   o Discussed Mechanical forces resulting in strain patterns during intrauterine life  and during birth may negatively affect the oral-motor function of the  and compromise structure and function.  Joint restriction or hypo-mobility could be a logical progression following the relative lack of mobility during the last crowded months of gestation. An  exceptionally flexed or rotated fetal posture may tighten myofascial structures in the neck, restricting the hyoid bone and strap muscles that support an effective swallow. More research reveals the body as an integrated whole via its major structure-maintaining and sensory organ, the fascia.  Other musculoskeletal issues, such as neck preferences, may also affect an infant's ability to nurse. These views have expanded and deepened over time.   o Observed gloved finger exam with high compression and tongue unable to consistently maintain cupping.  o Referred to [facility]tor[id number].   [address]  [place], [place] [address] Phone: [phone]  Cell Phone: [phone]  •  Triple feeding and its sustainability and its impact on the mother baby relationship  •  Maternal Mental Health: Discussed sustainability of recommendations, past experiences and referred to Kyree Garcia.   •  Sleep or lack of and strategies to manage restorative sleep, although short amounts, significant to the mental health of the mother.  •  Self Care. Support, rest, getting out of the house.  •  The LGA  (Large for gestational age) baby is often poky at the breast, falling asleep, wants to  feed few times per day, falls asleep at the bottle too.  They need time  so supply must be supported as well as infant growth.   •  Feeding: Managing with excellence based on infant growth  ?  Supplement: No formula  supplement is needed  o Baby needs 23 ounces per 24 hours for her growth  •  Positioning Techniques for bare breast  o Suggested positions Cross cradle or football  o Fine tune position by making sure your fingers beneath the breast as well as your bra, are out of the way of your baby's chin.  o Positioning:  Many positions shown, great sidelying at 7 minutes.   o See http://globalhealthmedia.org/portfolio-items/positions-for-breastfeeding/?mlphjogheTW=31554  o  Latch on Techniques for bare  "breast  o Fine tune latch:  - By holding your baby more securely at the breast, assisting your baby to stay attached by:  - Bringing your baby to your breast, not breast to the baby  - Your baby's cheek to touch breast securely, nose tipped back  - Hold your baby firmly in place so when your baby forgets to suck and picks it back up again your baby is in the correct spot. You will be extinguishing behavior and replacing it with a deeper latch to stimulate suck and provide satisfaction at the breast  - Your baby needs as much breast as deep in the mouth as possible to allow your nipples to heal and for you more importantly to maximize efficiency at the breast  • Latch is asymmetrical, leading with the chin, getting more underneath.  Supply concern: Discussed what does and does not make milk. Acupuncture is appropriate.   •  Triple feeding: A short term solution: Breast/bottle/pump:  o FIRST decide what you can do at that feeding and you may decide to double feed -pump and bottle, if you are going to offer the breast at that feeding:  - nurse first and limit time on the breasts to 20+/- min total  - finish with bottle  - pump afterwards to finish emptying your breasts which will help increase milk supply  - use yur own pumped milk, formula or donor human milk  - 30gm or ml = 1oz  •  Pumping guidelines:  o Both breasts using \"Hands-on Pumping\" for 8-12 minutes to stimulate milk production. See video at : http://newborns.[employer].edu/Breastfeeding/MaxProduction.html.then hand express after to   o Pumping is effective but can quickly exhaust and overwhelm a new mother  o Pump 8-10 times in 24 hours  o Type of pump:  - [place]   - http://www.[employer].com/healthcare-professionals/videos/ameda-platinum-with-hygienikit-video  - Always double pump  o How long will vary woman to woman, typically 8-15 minutes, or 1-2 minutes after flow slows, then for you, hand express  o Flange Fit: Freely moving nipple in the tunnel with some " movement of the areola.  - Today's evaluation indicates appropriate flange  •  Increasing supply besides Galactogogues and Pumping: Warmth, Relaxation , Physical, auditory narratives, Childbirth relaxation Techniques, Acupuncture and acupressure, Shoulder Massages and Take a nap  •  Connect with other mothers:  o Facebook:   - [place] Breastfeeds: [email or web address]  - Well-Nourished Babies (Private group for questions and support): https://www.facebook.com/groups/363636775705274/  o Breastfeeding [place] for support not assessment: Tuesday by Zoom, email invitation will be sent.  - LIZBETH Quinones, IBCLC  and Zita Muro IBNEGRO generally facilitate Tuesday Breastfeeding [place]    •  Nipple care:  o May apply breastmilk  o Moist-oily ointment after feeding/pumping, ie Lanolin nipple butter, coconut or olive oil, if desired/needed 2-3 times/day until nipples are healed  o You do not need to wash this off before pumping or feeding the baby  o You may want to remove baby from breast when active swallowing stops to avoid prolonged nipple compression  o Soft shells recommended  o Hydrogels recommended  Mom expressed understanding, and asked appropriate questions    Follow-up for infant weight check and dyad breastfeeding evaluation in 8-10 days  Please call [phone] if you have not scheduled your next appointment    I spent 60 min face to face excluding time with baby more than 50% of the time was spent on counseling and coordinating care as detailed in the above note.  An additional  30 min from 1120 - 1150am was spent in face to face prolonged care further counseling mom, planning lactation management, reviewing the feeding plan as detailed in the above note.  Dyad consult total time  Start time: 1009am  Stop time:1150am       PLEASE NOTE: Some of this note was created using voice recognition software. I have made every reasonable attempt to correct obvious errors, but I expect that there  may be errors of grammar and possibly content that I did not discover prior finalizing this note.  ROXANA Doan.

## 2020-08-14 ENCOUNTER — OFFICE VISIT (OUTPATIENT)
Dept: OBGYN | Facility: CLINIC | Age: 35
End: 2020-08-14
Payer: COMMERCIAL

## 2020-08-14 DIAGNOSIS — O92.29 SORE NIPPLES DUE TO LACTATION: ICD-10-CM

## 2020-08-14 DIAGNOSIS — O92.79 LACTATION DISORDER, POSTPARTUM CONDITION: ICD-10-CM

## 2020-08-14 PROCEDURE — 99214 OFFICE O/P EST MOD 30 MIN: CPT | Performed by: NURSE PRACTITIONER

## 2020-08-14 NOTE — PROGRESS NOTES
"Summary:Much improvement in two weeks with the support of ped chiro and [facility] in [place].  Supply is sufficient, infant removing milk more often, nipples healing. Has reduced pumping frequency and will reduce again, leaving milk for baby. Will add breastfeeding once at night as infant not as consoled with bottle, may move to exclusive breastfeeding with some topping off to continue knowing infant is not at 100% removal yet.     Subjective:   Tamiko Hilton is a 35 y.o. female here to follow up lactation care. She is here today with baby girl [place].    Concerns:   Latch on difficulties improved signicantly , nipple pain resolving , feeling that there is not enough milk but improving. Weight and intake eval  HPI:   Pertinent  history: c/section repeat with pp swelling  Mother does not have a history of advanced maternal age, GDM, hypertension prior to pregnancy, insulin resistance, multiple gestation, PCOS and thyroid disease. These are common conditions which may interfere in establishing milk supply.    Mother does have history of anxiety. This is a  common conditions which may interfere in establishing milk supply.    Breast changes in pregnancy: Yes  History of breast surgeries: Yes Augmentation    FEEDING HISTORY:    Past breastfeeding history:  First baby pumped for 4 months  Hospital course: Uneventful, latched, seemed ok  Prior to 20 visit: Seen by lactation in [place], set up pumping plan and breastfeeding, topping off with bottle.  Not sustainable. Was pumping 13x/day, decreased to 11-12x, stopped breast at night for her sanity, pumps and bottle feeds at that time. Managing [age] year old. \"Lazy\" at the breast, 20 minutes each side then 20 min bottle.   Currently: Supply is sufficient, infant removing milk more often, nipples healing. Has reduced pumping frequency   Both breasts: Yes  Bottle feeds: tops off each feeding /24h      Supplement: Expressed breast milk  Quantity: all " pumped milk  How given/devices:  Bottle, did bottle strike for 48 hours but not more flexible    Nipple Shield Use: None    Breast Pumping:   Hospital pump/ Has spectra also  Frequency: 8-9x/24 hours  Type of pump: Plantinum  Flange size/type: 25mm  Pain with pumping  31% suction 60 speed    ROS:  Constitutional:   no fever, chills. Feeling well  Extremities Swelling: No extremity swelling.   Gastrointestinal:  Negative for nausea, vomiting  Breasts: No soreness of breasts and Soreness of nipples resolving  Psychiatric: Feeling better,  naps when toddler naps  Mental Health:  No mention of feeling irritable, agitated, angry, overwhelmed, apathy, exhaustion nor having sleep changes outside infant feeds/demands or appetite changes       Objective:     General: no acute distress  Neurological:  Alert and oriented x3  Breasts: Symetrical , Soft, Plugged Duct - no evidence and Mastitis  - no S/S  Nipples: intact   Psychiatric:  Normal mood and affect. Her behavior is normal. Judgment and thought content normal   Referred to Behavioral Health 7/30/20, urgent     Assessment/Plan & Lactation Counseling:     Infant Weight History:  7/9/20 8#14  Lost a pound at first visit, mom HE an ounce and fed it back   7/30/20  9#7.8oz  8/14/20: 10#13.7oz 15 days/22oz  Infant intake at Breast:: L 1.1oz    R 1.3oz   Total 2.4oz Significant improvement  Milk Transfer at this feeding:   Effective breastfeeding at about 80% of available milk, normal for newborns  Pumped: Type of Pump: Hospital grade    Initiation of Feeding: Infant initiates  Position of Feeding:    Right: football  Left: cross cradle  Attachment Achieved: rapidly  Nipple shield: N/A   Suck Pattern at the breast: Less Chewing still non nutritive with periods of normal ssb patterns. Continues learning.  Behavior Following Observed Feeding: sleeping  Nipple Pain from: High vacuum causing nipple strain resulting in damage with pump and infant. Chiro has reduced infant  compression along with infant learning.    Latch: Minimal pinching after feeding both sides  Suckling/Feeding: attaches, baby roots, elicits DAHIANA, frequent pauses, intermittent swallows and rhythmic  Milk Supply Available: normal     Diagnosis/Problem  Lactation disorder, PP condition, milk transfer  Sore nipples due to lactation, improving      Discussed concerns and symptoms as listed above in assessment and guidance summarized below.  Topics reviewed included  • The nature of infants oral head/neck structure and function and its impact on latch and transfer of milk.   • Appointment with  Viktoria Stoddard [facility] effective  •  Triple feeding and its sustainability and its impact on the mother baby relationship Self Care. Support, rest, getting out of the house.  •   Feeding: Managing with excellence based on infant growth  ?  Supplement: No formula  supplement is needed  o Baby needs 26-28 ounces per 24 hours for her growth and based on growth she is getting that primarily at the breast  •  Positioning, latch reviewed, reinforced last visit discussion.   •  Pumping guidelines:  o Reduce pumping then continue to remove pumpings  May keep 2 per 24 hours as a goal instead of 8.  o Type of pump:  - Platinum, may return when confident with spectra, not during this phase.    - Always double pump  o How long will vary woman to woman, typically 8-15 minutes, or 1-2 minutes after flow slows, then for you, hand express  •  Increasing supply besides Galactogogues and Pumping: Warmth, Relaxation , Physical, auditory narratives, Childbirth relaxation Techniques, Acupuncture and acupressure, Shoulder Massages and Take a nap  •  Connect with other mothers:  o Facebook:   - [place] [facility]: [email or web address]  - Well-Nourished Babies (Private group for questions and support): https://www.facebook.com/groups/605995049929296/  o Breastfeeding [place] for support not assessment: Tuesday by  Zoom, email invitation will be sent.  - LIZBETH Quinones, IBCLC  and Zita Muro, IBCLC generally facilitate Tuesday Breastfeeding [place]    •  Nipple care:  o May apply breastmilk  o Moist-oily ointment after feeding/pumping, ie Lanolin nipple butter, coconut or olive oil, if desired/needed 2-3 times/day until nipples are healed  o You do not need to wash this off before pumping or feeding the baby  o You may want to remove baby from breast when active swallowing stops to avoid prolonged nipple compression  o Soft shells recommended  o Hydrogels recommended  Mom expressed understanding, and asked appropriate questions    Follow-up for infant weight check and dyad breastfeeding evaluation as needed. Always follow infant weight  Please call [phone] if you have not scheduled your next appointment    I spent 38 min face to face excluding time with baby more than 50% of the time was spent on counseling and coordinating care as detailed in the above note.     PLEASE NOTE: Some of this note was created using voice recognition software. I have made every reasonable attempt to correct obvious errors, but I expect that there may be errors of grammar and possibly content that I did not discover prior finalizing this note.  ROXANA Doan.

## 2020-09-18 ENCOUNTER — TELEMEDICINE (OUTPATIENT)
Dept: TELEHEALTH | Facility: TELEMEDICINE | Age: 35
End: 2020-09-18
Payer: COMMERCIAL

## 2020-09-18 DIAGNOSIS — R05.9 COUGH: ICD-10-CM

## 2020-09-18 PROCEDURE — 99213 OFFICE O/P EST LOW 20 MIN: CPT | Mod: 95,CR | Performed by: FAMILY MEDICINE

## 2020-09-18 RX ORDER — AZITHROMYCIN 250 MG/1
TABLET, FILM COATED ORAL
Qty: 6 TAB | Refills: 0 | Status: SHIPPED
Start: 2020-09-18 | End: 2021-09-13

## 2020-09-18 NOTE — PROGRESS NOTES
"Virtual Visit: Established Patient   This visit was conducted via Zoom using secure and encrypted videoconferencing technology. The patient was in a private location in the state of [place].    The patient's identity was confirmed and verbal consent was obtained for this virtual visit.    Subjective:   CC: No chief complaint on file.      Tamiko Hilton is a 35 y.o. female presenting for evaluation and management of:    2 weeks productive cough without blood in sputum.  No shortness of breath or wheeze.  She does hear an intermittent \"rumble\".  No fever.  No PMH asthma or pneumonia.  Initial nasal congestion and sore throat improved.  Her toddler was sick with cold-like symptoms a week or 2 ago and has improved.   with similar and was tested negative for COVID.  Tamiko does not have loss of taste or smell.      ROS   Denies any recent fevers or chills. No nausea or vomiting. No chest pains or shortness of breath.     Allergies   Allergen Reactions   • Percocet [Oxycodone-Acetaminophen] Itching       Current medicines (including changes today)  Current Outpatient Medications   Medication Sig Dispense Refill   • Prenatal Multivit-Min-Fe-FA (PRE-YULIYA FORMULA) Tab Take  by mouth every day.     • EVENING PRIMROSE OIL PO Take  by mouth every day.     • raNITidine (ZANTAC) 150 MG Tab Take 150 mg by mouth as needed for Heartburn.       No current facility-administered medications for this visit.        Patient Active Problem List    Diagnosis Date Noted   • At risk for postpartum depression 2020   • Missed  2017       Family History   Problem Relation Age of Onset   • Cancer Father        She  has a past medical history of Bowel habit changes, Heart burn, Hypertension, and Indigestion. She also has no past medical history of Hemorrhagic disorder (HCC).  She  has a past surgical history that includes other (10/2014); dilation and evacuation (2017); primary c section (N/A, 2018); and " repeat c section (N/A, 7/9/2020).     Currently breastfeeding     Objective:   There were no vitals taken for this visit.    Physical Exam:  Constitutional: Alert, no distress, well-groomed.  Skin: No rashes in visible areas.  Eye: Round. Conjunctiva clear, lids normal. No icterus.   ENMT: Lips pink without lesions, good dentition, moist mucous membranes. Phonation normal.  Neck: No masses, no thyromegaly. Moves freely without pain.  Respiratory: Unlabored respiratory effort, no cough or audible wheeze  Psych: Alert and oriented x3, normal affect and mood.       Assessment and Plan:     1. Cough  azithromycin (ZITHROMAX) 250 MG Tab     Differential diagnosis, natural history, supportive care, and indications for immediate follow-up discussed at length.     Discussed how limiting virtual visit is for cough.  No lung auscultation.  No pulse ox.  I recommend that she is seen in person however at this point as long she does not have fever and starts improving she can go another week.  I did write her a contingent antibiotic.

## 2020-09-23 ENCOUNTER — APPOINTMENT (OUTPATIENT)
Dept: RADIOLOGY | Facility: IMAGING CENTER | Age: 35
End: 2020-09-23
Attending: PHYSICIAN ASSISTANT
Payer: COMMERCIAL

## 2020-09-23 ENCOUNTER — HOSPITAL ENCOUNTER (OUTPATIENT)
Facility: MEDICAL CENTER | Age: 35
End: 2020-09-23
Attending: PHYSICIAN ASSISTANT
Payer: COMMERCIAL

## 2020-09-23 ENCOUNTER — OFFICE VISIT (OUTPATIENT)
Dept: URGENT CARE | Facility: CLINIC | Age: 35
End: 2020-09-23
Payer: COMMERCIAL

## 2020-09-23 VITALS
BODY MASS INDEX: 27.57 KG/M2 | TEMPERATURE: 98.5 F | HEART RATE: 84 BPM | HEIGHT: 63 IN | WEIGHT: 155.6 LBS | SYSTOLIC BLOOD PRESSURE: 110 MMHG | OXYGEN SATURATION: 95 % | DIASTOLIC BLOOD PRESSURE: 68 MMHG | RESPIRATION RATE: 16 BRPM

## 2020-09-23 DIAGNOSIS — R05.9 COUGH: ICD-10-CM

## 2020-09-23 DIAGNOSIS — R06.2 WHEEZE: ICD-10-CM

## 2020-09-23 DIAGNOSIS — R06.02 SOB (SHORTNESS OF BREATH): ICD-10-CM

## 2020-09-23 DIAGNOSIS — R05.9 COUGH: Primary | ICD-10-CM

## 2020-09-23 LAB — COVID ORDER STATUS COVID19: NORMAL

## 2020-09-23 PROCEDURE — 71046 X-RAY EXAM CHEST 2 VIEWS: CPT | Mod: TC | Performed by: PHYSICIAN ASSISTANT

## 2020-09-23 PROCEDURE — 99214 OFFICE O/P EST MOD 30 MIN: CPT | Performed by: PHYSICIAN ASSISTANT

## 2020-09-23 PROCEDURE — U0003 INFECTIOUS AGENT DETECTION BY NUCLEIC ACID (DNA OR RNA); SEVERE ACUTE RESPIRATORY SYNDROME CORONAVIRUS 2 (SARS-COV-2) (CORONAVIRUS DISEASE [COVID-19]), AMPLIFIED PROBE TECHNIQUE, MAKING USE OF HIGH THROUGHPUT TECHNOLOGIES AS DESCRIBED BY CMS-2020-01-R: HCPCS

## 2020-09-23 RX ORDER — PREDNISONE 10 MG/1
10 TABLET ORAL DAILY
Qty: 5 TAB | Refills: 0 | Status: SHIPPED | OUTPATIENT
Start: 2020-09-23 | End: 2020-09-28

## 2020-09-23 RX ORDER — ALBUTEROL SULFATE 90 UG/1
2 AEROSOL, METERED RESPIRATORY (INHALATION) EVERY 6 HOURS PRN
Qty: 8.5 G | Refills: 0 | Status: SHIPPED
Start: 2020-09-23 | End: 2021-09-13

## 2020-09-23 ASSESSMENT — ENCOUNTER SYMPTOMS
VOMITING: 0
FEVER: 0
SORE THROAT: 0
HEADACHES: 0
DIARRHEA: 0
SHORTNESS OF BREATH: 1
SPUTUM PRODUCTION: 1
WHEEZING: 1
MYALGIAS: 0
NAUSEA: 0
CHILLS: 0
COUGH: 1

## 2020-09-23 ASSESSMENT — FIBROSIS 4 INDEX: FIB4 SCORE: 0.84

## 2020-09-23 NOTE — PROGRESS NOTES
Subjective:      Tamiko Hilton is a 35 y.o. female who presents with No chief complaint on file.    Medications:    • Pre- Formula Tabs    Allergies: Percocet [oxycodone-acetaminophen]    Problem List: Tamiko Hilton has Missed  and At risk for postpartum depression on their problem list.    Surgical History:  Past Surgical History:   Procedure Laterality Date   • REPEAT C SECTION N/A 2020    Procedure:  SECTION, REPEAT;  Surgeon: lEisabeth Constantino M.D.;  Location: LABOR AND DELIVERY;  Service: Obstetrics   • PRIMARY C SECTION N/A 2018    Procedure: PRIMARY C SECTION;  Surgeon: Kannan Castillo M.D.;  Location: LABOR AND DELIVERY;  Service: Labor and Delivery   • DILATION AND EVACUATION  2017    Procedure: DILATION AND EVACUATION;  Surgeon: Kannan Castillo M.D.;  Location: SURGERY SAME DAY [facility];  Service:    • OTHER  10/2014    Breast augmentation       Past Social Hx: Tamiko Hilton  reports that she quit smoking about 3 years ago. She has a 5.00 pack-year smoking history. She has never used smokeless tobacco. She reports that she does not drink alcohol or use drugs.     Past Family Hx:  Tamiko Hilton family history includes Cancer in her father.     Problem list, medications, and allergies reviewed by myself today in Epic.        Pt presents to clinic with complaint of persistent hacking cough that is sometimes productive x 3 weeks. Pt states symptoms began 3 weeks ago when her 2 1/2 year old came home from  with URI symptoms.  Pt states her  had similar symptoms last week and tested negative for COVID.  Pt had a virtual visit last week for same illness, was given Zpack which has not really changed her symptoms, and now she is wheezing.  Pt denies HA, sore throat, chest pain, CUI, diarrhea, lower extremity swelling.  Pt appetite, sense of taste and smell are normal.          Cough  This is a new problem. The current episode started 1 to 4 weeks  "ago. The problem has been unchanged. The problem occurs every few minutes. The cough is productive of sputum. Associated symptoms include nasal congestion, shortness of breath and wheezing. Pertinent negatives include no chest pain, chills, fever, headaches, myalgias, postnasal drip, rash or sore throat. The symptoms are aggravated by pollens (exertion, coughing). She has tried body position changes and OTC cough suppressant (zpack) for the symptoms. The treatment provided no relief. Her past medical history is significant for environmental allergies. There is no history of asthma, bronchitis or pneumonia.       Review of Systems   Constitutional: Negative for chills and fever.   HENT: Positive for congestion. Negative for postnasal drip and sore throat.    Respiratory: Positive for cough, sputum production, shortness of breath and wheezing.    Cardiovascular: Negative for chest pain.   Gastrointestinal: Negative for diarrhea, nausea and vomiting.   Musculoskeletal: Negative for myalgias.   Skin: Negative for rash.   Neurological: Negative for headaches.   Endo/Heme/Allergies: Positive for environmental allergies.   All other systems reviewed and are negative.         Objective:     /68 (BP Location: Left arm, Patient Position: Sitting, BP Cuff Size: Adult)   Pulse 84   Temp 36.9 °C (98.5 °F)   Resp 16   Ht 1.6 m (5' 3\")   Wt 70.6 kg (155 lb 9.6 oz)   SpO2 95%   BMI 27.56 kg/m²      Physical Exam  Vitals signs and nursing note reviewed.   Constitutional:       General: She is not in acute distress.     Appearance: Normal appearance. She is well-developed, well-groomed and normal weight. She is not toxic-appearing.   HENT:      Head: Normocephalic and atraumatic.      Right Ear: Tympanic membrane normal.      Left Ear: Tympanic membrane normal.      Nose: Congestion present.      Mouth/Throat:      Lips: Pink.      Mouth: Mucous membranes are moist.      Pharynx: Uvula midline.   Eyes:      Extraocular " Movements: Extraocular movements intact.      Conjunctiva/sclera: Conjunctivae normal.      Pupils: Pupils are equal, round, and reactive to light.   Neck:      Musculoskeletal: Normal range of motion and neck supple.   Cardiovascular:      Rate and Rhythm: Normal rate and regular rhythm.      Pulses: Normal pulses.      Heart sounds: Normal heart sounds.   Pulmonary:      Effort: Pulmonary effort is normal. No respiratory distress.      Breath sounds: No stridor. Wheezing present. No rhonchi or rales.   Abdominal:      Palpations: Abdomen is soft.   Musculoskeletal: Normal range of motion.   Skin:     General: Skin is warm and dry.      Capillary Refill: Capillary refill takes less than 2 seconds.   Neurological:      General: No focal deficit present.      Mental Status: She is alert and oriented to person, place, and time.      Gait: Gait normal.   Psychiatric:         Mood and Affect: Mood normal.         Behavior: Behavior is cooperative.              RADIOLOGY RESULTS   Dx-chest-2 Views    Result Date: 9/23/2020 9/23/2020 3:44 PM HISTORY/REASON FOR EXAM: Cough TECHNIQUE/EXAM DESCRIPTION AND NUMBER OF VIEWS: Two views of the chest. COMPARISON: None. FINDINGS: There is no evidence of focal consolidation or evidence of pulmonary edema. The heart is normal in size. There is no evidence of pleural effusion. Soft tissues and bony structures are unremarkable.     No evidence of acute cardiopulmonary process.         Assessment/Plan:     1. Cough  DX-CHEST-2 VIEWS    COVID/SARS COV-2 PCR    albuterol 108 (90 Base) MCG/ACT Aero Soln inhalation aerosol    predniSONE (DELTASONE) 10 MG Tab   2. SOB (shortness of breath)  DX-CHEST-2 VIEWS    COVID/SARS COV-2 PCR    albuterol 108 (90 Base) MCG/ACT Aero Soln inhalation aerosol    predniSONE (DELTASONE) 10 MG Tab   3. Wheeze  DX-CHEST-2 VIEWS    COVID/SARS COV-2 PCR    albuterol 108 (90 Base) MCG/ACT Aero Soln inhalation aerosol    predniSONE (DELTASONE) 10 MG Tab     Per  protocol for PUI/ISO patients, the patient was evaluated by me while I was wearing PPE.  Per CDC guidelines, patient has been instructed to self quarantine at home for at least 7 days from onset of symptoms and at least 3 full days after resolution of fever/respiratory symptoms.  PT verbalized agreement to do so.      This is likely a viral bronchitis, will test for covid, give Rx for inhaler and steroids, pt declined codiene cough syrup due to breast feeding. Will do OTC cough syrup and lozenges.      PT should follow up with PCP in 1-2 days for re-evaluation if symptoms have not improved.  Discussed red flags and reasons to return to UC or ED.  Pt and/or family verbalized understanding of diagnosis and follow up instructions and was offered informational handout on diagnosis.  PT discharged.

## 2020-09-23 NOTE — PATIENT INSTRUCTIONS
INSTRUCTIONS FOR COVID-19 OR ANY OTHER INFECTIOUS RESPIRATORY ILLNESSES    The Centers for Disease Control and Prevention (CDC) states that early indications for COVID-19 include cough, shortness of breath, difficulty breathing, or at least two of the following symptoms: chills, shaking with chills, muscle pain, headache, sore throat, and loss of taste or smell. Symptoms can range from mild to severe and may appear up to two weeks after exposure to the virus.    The practice of self-isolation and quarantine helps protect the public and your family by  preventing exposure to people who have or may have a contagious disease. Please follow the prevention steps below as based on CDC guidelines:    WHEN TO STOP ISOLATION: Persons with COVID-19 or any other infectious respiratory illness who have symptoms and were advised to care for themselves at home may discontinue home isolation under the following conditions:  · At least 24 hours have passed since recovery defined as resolution of fever without the use of fever-reducing medications; AND,  · Improvement in respiratory symptoms (e.g., cough, shortness of breath); AND,  · At least 10 days have passed since symptoms first appeared and have had no subsequent illness.    MONITOR YOUR SYMPTOMS: If your illness is worsening, seek prompt medical attention. If you have a medical emergency and need to call 911, notify the dispatch personnel that you have, or are being evaluated for confirmed or suspected COVID-19 or another infectious respiratory illness. Wear a facemask if possible.    ACTIVITY RESTRICTION: restrict activities outside your home, except for getting medical care. Do not go to work, school, or public areas. Avoid using public transportation, ride-sharing, or taxis.    SCHEDULED MEDICAL APPOINTMENTS: Notify your provider that you have, or are being evaluated for, confirmed or suspected COVID-19 or another infectious respiratory. This will help the healthcare  provider’s office safely take care of you and keep other people from getting exposed or infected.    FACEMASKS, when to wear: Anytime you are away from your home or around other people or pets. If you are unable to wear one, maintain a minimum of 6 feet distancing from others.    LIVING ENVIRONMENT: Stay in a separate room from other people and pets. If possible, use a separate bathroom, have someone else care for your pets and avoid sharing household items. Any items used should be washed thoroughly with soap and water. Clean all “high-touch” surfaces every day. Use a household cleaning spray or wipe, according to the label instructions. High touch surfaces include (but are not limited to) counters, tabletops, doorknobs, bathroom fixtures, toilets, phones, keyboards, tablets, and bedside tables.     HAND WASHING: Frequently wash hands with soap and water for at least 20 seconds,  especially after blowing your nose, coughing, or sneezing; going to the bathroom; before and after interacting with pets; and before and after eating or preparing food. If hands are visibly dirty use soap and water. If soap and water are not available, use an alcohol-based hand  with at least 60% alcohol. Avoid touching your eyes, nose, and mouth with unwashed hands. Cover your coughs and sneezes with a tissue. Throw used tissues in a lined trash can. Immediately wash your hands.    ACTIVE/FACILITATED SELF-MONITORING: Follow instructions provided by your local health department or health professionals, as appropriate. When working with your local health department check their available hours.    [facility]   Phone Number   [facility] [phone]   [facility] [place] [phone]   [place] Call 211   [place] [phone]     IF YOU HAVE CONFIRMED POSITIVE COVID-19:    Those who have completely recovered from COVID-19 may have immune-boosting antibodies in their plasma--called “convalescent plasma”--that could be  used to treat critically ill COVID19 patients.    [facility] is excited to begin working with [facility] on collecting convalescent plasma from  people who have recovered from COVID-19 as part of a program to treat patients infected with the virus. This FDA-approved “emergency investigational new drug” is a special blood product containing antibodies that may give patients an extra boost to fight the virus.    To be eligible to donate convalescent plasma, you must have a prior COVID-19 diagnosis documented by a laboratory test (or a positive test result for SARS-CoV-2 antibodies) and meet additional eligibility requirements.    If you are interested in donating convalescent plasma or have any additional questions, please contact the [facility] Convalescent Plasma  at [phone] or via e-mail at [email or web address].

## 2020-09-24 LAB
SARS-COV-2 RNA RESP QL NAA+PROBE: NOTDETECTED
SPECIMEN SOURCE: NORMAL

## 2021-08-04 ENCOUNTER — TELEPHONE (OUTPATIENT)
Dept: SCHEDULING | Facility: IMAGING CENTER | Age: 36
End: 2021-08-04

## 2021-09-13 ENCOUNTER — OFFICE VISIT (OUTPATIENT)
Dept: MEDICAL GROUP | Facility: MEDICAL CENTER | Age: 36
End: 2021-09-13
Payer: COMMERCIAL

## 2021-09-13 VITALS
HEIGHT: 63 IN | DIASTOLIC BLOOD PRESSURE: 70 MMHG | SYSTOLIC BLOOD PRESSURE: 114 MMHG | HEART RATE: 72 BPM | TEMPERATURE: 97.9 F | WEIGHT: 150.6 LBS | BODY MASS INDEX: 26.68 KG/M2

## 2021-09-13 DIAGNOSIS — E66.3 OVERWEIGHT: ICD-10-CM

## 2021-09-13 DIAGNOSIS — Z13.220 LIPID SCREENING: ICD-10-CM

## 2021-09-13 DIAGNOSIS — R53.83 OTHER FATIGUE: ICD-10-CM

## 2021-09-13 DIAGNOSIS — F32.1 CURRENT MODERATE EPISODE OF MAJOR DEPRESSIVE DISORDER WITHOUT PRIOR EPISODE (HCC): ICD-10-CM

## 2021-09-13 DIAGNOSIS — F41.9 ANXIETY: ICD-10-CM

## 2021-09-13 PROBLEM — O02.1 MISSED ABORTION: Status: RESOLVED | Noted: 2017-04-07 | Resolved: 2021-09-13

## 2021-09-13 PROBLEM — Z91.89 AT RISK FOR POSTPARTUM DEPRESSION: Status: RESOLVED | Noted: 2020-07-30 | Resolved: 2021-09-13

## 2021-09-13 PROCEDURE — 99204 OFFICE O/P NEW MOD 45 MIN: CPT | Performed by: FAMILY MEDICINE

## 2021-09-13 RX ORDER — M-VIT,TX,IRON,MINS/CALC/FOLIC 27MG-0.4MG
1 TABLET ORAL DAILY
COMMUNITY

## 2021-09-13 ASSESSMENT — PATIENT HEALTH QUESTIONNAIRE - PHQ9
CLINICAL INTERPRETATION OF PHQ2 SCORE: 2
5. POOR APPETITE OR OVEREATING: 0 - NOT AT ALL

## 2021-09-13 ASSESSMENT — FIBROSIS 4 INDEX: FIB4 SCORE: 0.87

## 2021-09-13 NOTE — ASSESSMENT & PLAN NOTE
Generally weighs 130-135lb.   Exercises 3-4 times per week.   Patient reports that she eats really well.

## 2021-09-13 NOTE — ASSESSMENT & PLAN NOTE
Feeling pretty down since her baby was born 14 months ago.   Feels really foggy  Feeling very anxious as well.   Never had issues with anxiety or depression previously.   She did have post partum blues with her second that resolved at 9 months.   Heavier than she was previously.   Having regular periods, 24 days, 6-7 days of bleeding.   Sleeping through the night.   Oldest is 3.5 yrs old.   Had severe fatigue with zoloft.

## 2021-09-13 NOTE — PROGRESS NOTES
Subjective:     CC: Diagnoses of Current moderate episode of major depressive disorder without prior episode (HCC), Other fatigue, Lipid screening, Overweight, and Anxiety were pertinent to this visit.    HPI: Patient is a 36 y.o. female new patient who presents today to establish care.       Current moderate episode of major depressive disorder without prior episode (HCC)  Feeling pretty down since her baby was born 14 months ago.   Feels really foggy  Feeling very anxious as well.   Never had issues with anxiety or depression previously.   She did have post partum blues with her second that resolved at 9 months.   Heavier than she was previously.   Having regular periods, 24 days, 6-7 days of bleeding.   Sleeping through the night.   Oldest is 3.5 yrs old.   Had severe fatigue with zoloft.       Overweight  Generally weighs 130-135lb.   Exercises 3-4 times per week.   Patient reports that she eats really well.         Past Medical History:   Diagnosis Date   • Bowel habit changes     constpation   • Heart burn    • Hypertension     gestational   • Indigestion        Social History     Tobacco Use   • Smoking status: Former Smoker     Packs/day: 0.50     Years: 10.00     Pack years: 5.00     Quit date: 2016     Years since quittin.7   • Smokeless tobacco: Never Used   Vaping Use   • Vaping Use: Never used   Substance Use Topics   • Alcohol use: No     Alcohol/week: 0.0 oz     Comment: not while pregnant   • Drug use: No       Current Outpatient Medications Ordered in Epic   Medication Sig Dispense Refill   • therapeutic multivitamin-minerals (THERAGRAN-M) Tab Take 1 Tablet by mouth every day.       No current [facility]-ordered facility-administered medications on file.       Allergies:  Percocet [oxycodone-acetaminophen]    Health Maintenance: Completed    ROS:  Gen: no fevers/chill, no changes in weight  Eyes: no changes in vision  ENT: no sore throat, no hearing loss, no bloody nose  Pulm: no sob, no  "cough  CV: no chest pain, no palpitations  GI: no nausea/vomiting, no diarrhea  : no dysuria  MSk: no myalgias  Skin: no rash  Neuro: no headaches, no numbness/tingling  Heme/Lymph: no easy bruising      Objective:       Exam:  /70 (BP Location: Left arm, Patient Position: Sitting, BP Cuff Size: Adult long)   Pulse 72   Temp 36.6 °C (97.9 °F) (Temporal)   Ht 1.6 m (5' 3\")   Wt 68.3 kg (150 lb 9.6 oz)   LMP 09/07/2021   Breastfeeding Yes   BMI 26.68 kg/m²  Body mass index is 26.68 kg/m².      General: Normal appearing. No distress.  HEAD: NCAT  EYES: conjunctiva clear, lids without ptosis, pupils equal  and reactive to light  EARS: ears normal shape and contour, canals are clear bilaterally, TMs clear  MOUTH: oropharynx is without erythema, edema or exudates.   Neck: Supple without masses. Thyroid is not enlarged. Normal ROM  Pulmonary: Clear to ausculation.  Normal effort. No rales, ronchi, or wheezing.  Cardiovascular: Regular rate and rhythm, no murmur. No LE edema  Neurologic: Grossly normal, no focal deficits  Lymph: No cervical or supraclavicular lymph nodes are palpable  Skin: Warm and dry.  No obvious lesions.  Musculoskeletal: Normal gait and station.   Psych: Normal mood and affect. Alert and oriented x3. Judgment and insight is normal.      Assessment & Plan:     36 y.o. female with the following -     1. Current moderate episode of major depressive disorder without prior episode (HCC)  New to discuss.  The patient has struggled with depression on and off since her first child was born 3 and half years ago.  Labs ordered to rule out physiologic etiology.  She is open to therapy, referral placed for behavioral health today.  She is also interested in medication, we will discuss that at her follow-up visit 1 week follow-up on labs.  - Comp Metabolic Panel; Future  - TSH WITH REFLEX TO FT4; Future  - CBC WITH DIFFERENTIAL; Future  - VITAMIN D,25 HYDROXY; Future  - VITAMIN B12; Future  - " REFERRAL TO BEHAVIORAL HEALTH    2. Other fatigue  - Comp Metabolic Panel; Future  - TSH WITH REFLEX TO FT4; Future  - CBC WITH DIFFERENTIAL; Future  - VITAMIN D,25 HYDROXY; Future  - VITAMIN B12; Future    3. Lipid screening  - Lipid Profile; Future    4. Overweight  New problem.  Patient is still up around 20 pounds since her pregnancy.  Baby is now 14 months old.  We can discuss further once labs are back.  She exercises 3 times per week.  Reports a healthy diet.  Has not been logging food though.    5. Anxiety  - REFERRAL TO BEHAVIORAL HEALTH    Patient states she is up-to-date on Pap smear, was requested from her OB/GYN.    Return in about 4 weeks (around 10/11/2021).    Please note that this dictation was created using voice recognition software. I have made every reasonable attempt to correct obvious errors, but I expect that there are errors of grammar and possibly content that I did not discover before finalizing the note.

## 2021-10-04 ENCOUNTER — HOSPITAL ENCOUNTER (OUTPATIENT)
Dept: LAB | Facility: MEDICAL CENTER | Age: 36
End: 2021-10-04
Attending: FAMILY MEDICINE
Payer: COMMERCIAL

## 2021-10-04 DIAGNOSIS — R53.83 OTHER FATIGUE: ICD-10-CM

## 2021-10-04 DIAGNOSIS — F32.1 CURRENT MODERATE EPISODE OF MAJOR DEPRESSIVE DISORDER WITHOUT PRIOR EPISODE (HCC): ICD-10-CM

## 2021-10-04 DIAGNOSIS — Z13.220 LIPID SCREENING: ICD-10-CM

## 2021-10-04 LAB
25(OH)D3 SERPL-MCNC: 70 NG/ML (ref 30–100)
ALBUMIN SERPL BCP-MCNC: 4.4 G/DL (ref 3.2–4.9)
ALBUMIN/GLOB SERPL: 1.7 G/DL
ALP SERPL-CCNC: 48 U/L (ref 30–99)
ALT SERPL-CCNC: 11 U/L (ref 2–50)
ANION GAP SERPL CALC-SCNC: 12 MMOL/L (ref 7–16)
AST SERPL-CCNC: 18 U/L (ref 12–45)
BASOPHILS # BLD AUTO: 0.4 % (ref 0–1.8)
BASOPHILS # BLD: 0.03 K/UL (ref 0–0.12)
BILIRUB SERPL-MCNC: 0.6 MG/DL (ref 0.1–1.5)
BUN SERPL-MCNC: 9 MG/DL (ref 8–22)
CALCIUM SERPL-MCNC: 9.1 MG/DL (ref 8.5–10.5)
CHLORIDE SERPL-SCNC: 106 MMOL/L (ref 96–112)
CHOLEST SERPL-MCNC: 196 MG/DL (ref 100–199)
CO2 SERPL-SCNC: 22 MMOL/L (ref 20–33)
CREAT SERPL-MCNC: 0.62 MG/DL (ref 0.5–1.4)
EOSINOPHIL # BLD AUTO: 0.18 K/UL (ref 0–0.51)
EOSINOPHIL NFR BLD: 2.6 % (ref 0–6.9)
ERYTHROCYTE [DISTWIDTH] IN BLOOD BY AUTOMATED COUNT: 44.1 FL (ref 35.9–50)
FASTING STATUS PATIENT QL REPORTED: NORMAL
GLOBULIN SER CALC-MCNC: 2.6 G/DL (ref 1.9–3.5)
GLUCOSE SERPL-MCNC: 84 MG/DL (ref 65–99)
HCT VFR BLD AUTO: 42.9 % (ref 37–47)
HDLC SERPL-MCNC: 66 MG/DL
HGB BLD-MCNC: 14 G/DL (ref 12–16)
IMM GRANULOCYTES # BLD AUTO: 0.01 K/UL (ref 0–0.11)
IMM GRANULOCYTES NFR BLD AUTO: 0.1 % (ref 0–0.9)
LDLC SERPL CALC-MCNC: 118 MG/DL
LYMPHOCYTES # BLD AUTO: 1.53 K/UL (ref 1–4.8)
LYMPHOCYTES NFR BLD: 22.2 % (ref 22–41)
MCH RBC QN AUTO: 30.7 PG (ref 27–33)
MCHC RBC AUTO-ENTMCNC: 32.6 G/DL (ref 33.6–35)
MCV RBC AUTO: 94.1 FL (ref 81.4–97.8)
MONOCYTES # BLD AUTO: 0.45 K/UL (ref 0–0.85)
MONOCYTES NFR BLD AUTO: 6.5 % (ref 0–13.4)
NEUTROPHILS # BLD AUTO: 4.68 K/UL (ref 2–7.15)
NEUTROPHILS NFR BLD: 68.2 % (ref 44–72)
NRBC # BLD AUTO: 0 K/UL
NRBC BLD-RTO: 0 /100 WBC
PLATELET # BLD AUTO: 256 K/UL (ref 164–446)
PMV BLD AUTO: 9.6 FL (ref 9–12.9)
POTASSIUM SERPL-SCNC: 3.7 MMOL/L (ref 3.6–5.5)
PROT SERPL-MCNC: 7 G/DL (ref 6–8.2)
RBC # BLD AUTO: 4.56 M/UL (ref 4.2–5.4)
SODIUM SERPL-SCNC: 140 MMOL/L (ref 135–145)
TRIGL SERPL-MCNC: 61 MG/DL (ref 0–149)
TSH SERPL DL<=0.005 MIU/L-ACNC: 0.8 UIU/ML (ref 0.38–5.33)
VIT B12 SERPL-MCNC: 530 PG/ML (ref 211–911)
WBC # BLD AUTO: 6.9 K/UL (ref 4.8–10.8)

## 2021-10-04 PROCEDURE — 36415 COLL VENOUS BLD VENIPUNCTURE: CPT

## 2021-10-04 PROCEDURE — 82306 VITAMIN D 25 HYDROXY: CPT

## 2021-10-04 PROCEDURE — 85025 COMPLETE CBC W/AUTO DIFF WBC: CPT

## 2021-10-04 PROCEDURE — 80061 LIPID PANEL: CPT

## 2021-10-04 PROCEDURE — 80053 COMPREHEN METABOLIC PANEL: CPT

## 2021-10-04 PROCEDURE — 82607 VITAMIN B-12: CPT

## 2021-10-04 PROCEDURE — 84443 ASSAY THYROID STIM HORMONE: CPT

## 2021-10-11 ENCOUNTER — OFFICE VISIT (OUTPATIENT)
Dept: MEDICAL GROUP | Facility: MEDICAL CENTER | Age: 36
End: 2021-10-11
Payer: COMMERCIAL

## 2021-10-11 VITALS
WEIGHT: 151 LBS | HEIGHT: 63 IN | SYSTOLIC BLOOD PRESSURE: 100 MMHG | DIASTOLIC BLOOD PRESSURE: 64 MMHG | TEMPERATURE: 97.2 F | OXYGEN SATURATION: 97 % | BODY MASS INDEX: 26.75 KG/M2 | HEART RATE: 89 BPM

## 2021-10-11 DIAGNOSIS — E78.00 ELEVATED LDL CHOLESTEROL LEVEL: ICD-10-CM

## 2021-10-11 DIAGNOSIS — Z23 NEED FOR VACCINATION: ICD-10-CM

## 2021-10-11 DIAGNOSIS — F32.1 CURRENT MODERATE EPISODE OF MAJOR DEPRESSIVE DISORDER WITHOUT PRIOR EPISODE (HCC): ICD-10-CM

## 2021-10-11 PROCEDURE — 90471 IMMUNIZATION ADMIN: CPT | Performed by: FAMILY MEDICINE

## 2021-10-11 PROCEDURE — 90686 IIV4 VACC NO PRSV 0.5 ML IM: CPT | Performed by: FAMILY MEDICINE

## 2021-10-11 PROCEDURE — 99213 OFFICE O/P EST LOW 20 MIN: CPT | Mod: 25 | Performed by: FAMILY MEDICINE

## 2021-10-11 ASSESSMENT — PATIENT HEALTH QUESTIONNAIRE - PHQ9
3. TROUBLE FALLING OR STAYING ASLEEP OR SLEEPING TOO MUCH: NOT AT ALL
1. LITTLE INTEREST OR PLEASURE IN DOING THINGS: NOT AT ALL
9. THOUGHTS THAT YOU WOULD BE BETTER OFF DEAD, OR OF HURTING YOURSELF: NOT AT ALL
2. FEELING DOWN, DEPRESSED, IRRITABLE, OR HOPELESS: NOT AT ALL
6. FEELING BAD ABOUT YOURSELF - OR THAT YOU ARE A FAILURE OR HAVE LET YOURSELF OR YOUR FAMILY DOWN: NOT AL ALL
8. MOVING OR SPEAKING SO SLOWLY THAT OTHER PEOPLE COULD HAVE NOTICED. OR THE OPPOSITE, BEING SO FIGETY OR RESTLESS THAT YOU HAVE BEEN MOVING AROUND A LOT MORE THAN USUAL: NOT AT ALL
SUM OF ALL RESPONSES TO PHQ QUESTIONS 1-9: 0
5. POOR APPETITE OR OVEREATING: NOT AT ALL
SUM OF ALL RESPONSES TO PHQ9 QUESTIONS 1 AND 2: 0
4. FEELING TIRED OR HAVING LITTLE ENERGY: NOT AT ALL
7. TROUBLE CONCENTRATING ON THINGS, SUCH AS READING THE NEWSPAPER OR WATCHING TELEVISION: NOT AT ALL

## 2021-10-11 ASSESSMENT — FIBROSIS 4 INDEX: FIB4 SCORE: 0.74

## 2021-10-11 NOTE — PROGRESS NOTES
Subjective:     CC: Diagnoses of Need for vaccination, Current moderate episode of major depressive disorder without prior episode (HCC), and Elevated LDL cholesterol level were pertinent to this visit.    HPI: Patient is a 36 y.o. female established patient who presents today to follow up on labs.       Current moderate episode of major depressive disorder without prior episode (HCC)  Chronic problem. Patient did get established with psych but is switching to a different therapist. She would like to work on therapy for a bit and would like to hold off on starting a medicine. She is currently doing ok. No SI.   Working on getting more sleep and eating healthier.   She is exercising 3 times per week.     Elevated LDL cholesterol level  New problem. Mildly elevated at 118. She does have a family history of high cholesterol. She reports a somewhat healthy diet although does eat more carbs than she used to. She exercises regularly. The rest of her lipid panel looks great.       Past Medical History:   Diagnosis Date   • Bowel habit changes     constpation   • Heart burn    • Hypertension     gestational   • Indigestion        Social History     Tobacco Use   • Smoking status: Former Smoker     Packs/day: 0.50     Years: 10.00     Pack years: 5.00     Quit date: 2016     Years since quittin.8   • Smokeless tobacco: Never Used   Vaping Use   • Vaping Use: Never used   Substance Use Topics   • Alcohol use: No     Alcohol/week: 0.0 oz     Comment: not while pregnant   • Drug use: No       Current Outpatient Medications Ordered in Epic   Medication Sig Dispense Refill   • therapeutic multivitamin-minerals (THERAGRAN-M) Tab Take 1 Tablet by mouth every day.       No current [facility]-ordered facility-administered medications on file.       Allergies:  Percocet [oxycodone-acetaminophen]    Health Maintenance: Completed    ROS:  Pulm: no sob, no cough  CV: no chest pain, no palpitations      Objective:       Exam:  BP  "100/64 (BP Location: Right arm, Patient Position: Sitting, BP Cuff Size: Adult long)   Pulse 89   Temp 36.2 °C (97.2 °F) (Temporal)   Ht 1.6 m (5' 3\")   Wt 68.5 kg (151 lb)   SpO2 97%   BMI 26.75 kg/m²  Body mass index is 26.75 kg/m².    General: Normal appearing. No distress.  HEAD: NCAT  EYES: conjunctiva clear, lids without ptosis, pupils equal and reactive to light  EARS: ears normal shape and contour.  MOUTH: normal dentition   Neck:  Normal ROM  Pulmonary: Normal effort. Normal respiratory rate.  Cardiovascular: Well perfused. No LE edema  Neurologic: Grossly normal, no focal deficits  Skin: Warm and dry.  No obvious lesions.  Musculoskeletal: Normal gait and station.   Psych: Normal mood and affect. Alert and oriented x3. Judgment and insight is normal.      Labs: 10/4/21 Results reviewed and discussed with the patient, questions answered.    Assessment & Plan:     36 y.o. female with the following -     1. Need for vaccination  - INFLUENZA VACCINE QUAD INJ (PF)    2. Current moderate episode of major depressive disorder without prior episode (HCC)  Chronic problem. Currently doing ok. Would like to hold off on meds and focus on therapy.     3. Elevated LDL cholesterol level  New problem. Reviewed dietary recommendations at length today. She is going to work on reducing flour and sugar. Work on intuitive eating as her baseline weight prior to kids was quite normal.     Return in about 1 year (around 10/11/2022), or if symptoms worsen or fail to improve.    Please note that this dictation was created using voice recognition software. I have made every reasonable attempt to correct obvious errors, but I expect that there are errors of grammar and possibly content that I did not discover before finalizing the note.      "

## 2021-10-11 NOTE — ASSESSMENT & PLAN NOTE
New problem. Mildly elevated at 118. She does have a family history of high cholesterol. She reports a somewhat healthy diet although does eat more carbs than she used to. She exercises regularly. The rest of her lipid panel looks great.

## 2021-10-11 NOTE — ASSESSMENT & PLAN NOTE
Chronic problem. Patient did get established with psych but is switching to a different therapist. She would like to work on therapy for a bit and would like to hold off on starting a medicine. She is currently doing ok. No SI.   Working on getting more sleep and eating healthier.   She is exercising 3 times per week.

## 2021-11-10 ENCOUNTER — APPOINTMENT (RX ONLY)
Dept: URBAN - METROPOLITAN AREA CLINIC 4 | Facility: CLINIC | Age: 36
Setting detail: DERMATOLOGY
End: 2021-11-10

## 2021-11-10 DIAGNOSIS — Z71.89 OTHER SPECIFIED COUNSELING: ICD-10-CM

## 2021-11-10 DIAGNOSIS — D18.0 HEMANGIOMA: ICD-10-CM

## 2021-11-10 DIAGNOSIS — L82.1 OTHER SEBORRHEIC KERATOSIS: ICD-10-CM

## 2021-11-10 DIAGNOSIS — L81.4 OTHER MELANIN HYPERPIGMENTATION: ICD-10-CM

## 2021-11-10 DIAGNOSIS — D22 MELANOCYTIC NEVI: ICD-10-CM | Status: WORSENING

## 2021-11-10 PROBLEM — D48.5 NEOPLASM OF UNCERTAIN BEHAVIOR OF SKIN: Status: ACTIVE | Noted: 2021-11-10

## 2021-11-10 PROBLEM — D23.61 OTHER BENIGN NEOPLASM OF SKIN OF RIGHT UPPER LIMB, INCLUDING SHOULDER: Status: ACTIVE | Noted: 2021-11-10

## 2021-11-10 PROBLEM — D18.01 HEMANGIOMA OF SKIN AND SUBCUTANEOUS TISSUE: Status: ACTIVE | Noted: 2021-11-10

## 2021-11-10 PROBLEM — D22.5 MELANOCYTIC NEVI OF TRUNK: Status: ACTIVE | Noted: 2021-11-10

## 2021-11-10 PROCEDURE — ? BIOPSY BY SHAVE METHOD

## 2021-11-10 PROCEDURE — 99203 OFFICE O/P NEW LOW 30 MIN: CPT | Mod: 25

## 2021-11-10 PROCEDURE — ? COUNSELING

## 2021-11-10 PROCEDURE — 69100 BIOPSY OF EXTERNAL EAR: CPT

## 2021-11-10 PROCEDURE — 11103 TANGNTL BX SKIN EA SEP/ADDL: CPT | Mod: 59

## 2021-11-10 PROCEDURE — ? OBSERVATION AND MEASURE

## 2021-11-10 PROCEDURE — ? DIAGNOSIS COMMENT

## 2021-11-10 PROCEDURE — 11102 TANGNTL BX SKIN SINGLE LES: CPT | Mod: 59

## 2021-11-10 ASSESSMENT — LOCATION ZONE DERM
LOCATION ZONE: ARM
LOCATION ZONE: EAR
LOCATION ZONE: FACE
LOCATION ZONE: TRUNK

## 2021-11-10 ASSESSMENT — LOCATION DETAILED DESCRIPTION DERM
LOCATION DETAILED: LEFT INFERIOR UPPER BACK
LOCATION DETAILED: RIGHT SUPERIOR UPPER BACK
LOCATION DETAILED: RIGHT ANTERIOR PROXIMAL UPPER ARM
LOCATION DETAILED: RIGHT INFERIOR CENTRAL MALAR CHEEK
LOCATION DETAILED: SUPERIOR LUMBAR SPINE
LOCATION DETAILED: RIGHT CAVUM CONCHA
LOCATION DETAILED: RIGHT SUPERIOR MEDIAL UPPER BACK
LOCATION DETAILED: RIGHT LATERAL BUCCAL CHEEK
LOCATION DETAILED: LEFT ANTERIOR PROXIMAL UPPER ARM

## 2021-11-10 ASSESSMENT — LOCATION SIMPLE DESCRIPTION DERM
LOCATION SIMPLE: LEFT UPPER BACK
LOCATION SIMPLE: RIGHT UPPER ARM
LOCATION SIMPLE: RIGHT CHEEK
LOCATION SIMPLE: RIGHT UPPER BACK
LOCATION SIMPLE: LEFT UPPER ARM
LOCATION SIMPLE: LOWER BACK
LOCATION SIMPLE: RIGHT EAR

## 2021-11-10 NOTE — PROCEDURE: DIAGNOSIS COMMENT
Comment: Counseled in risk/benefits of excision. Patient states they would like to proceed with excision. Will start PA for excision w/ Dr. Merida or Patricia.
Render Risk Assessment In Note?: no
Detail Level: Simple

## 2022-11-21 ENCOUNTER — APPOINTMENT (RX ONLY)
Dept: URBAN - METROPOLITAN AREA CLINIC 4 | Facility: CLINIC | Age: 37
Setting detail: DERMATOLOGY
End: 2022-11-21

## 2022-11-21 DIAGNOSIS — D18.0 HEMANGIOMA: ICD-10-CM

## 2022-11-21 DIAGNOSIS — L72.0 EPIDERMAL CYST: ICD-10-CM

## 2022-11-21 DIAGNOSIS — D22 MELANOCYTIC NEVI: ICD-10-CM

## 2022-11-21 DIAGNOSIS — L81.4 OTHER MELANIN HYPERPIGMENTATION: ICD-10-CM

## 2022-11-21 DIAGNOSIS — L21.8 OTHER SEBORRHEIC DERMATITIS: ICD-10-CM

## 2022-11-21 DIAGNOSIS — L82.1 OTHER SEBORRHEIC KERATOSIS: ICD-10-CM

## 2022-11-21 PROBLEM — D23.61 OTHER BENIGN NEOPLASM OF SKIN OF RIGHT UPPER LIMB, INCLUDING SHOULDER: Status: ACTIVE | Noted: 2022-11-21

## 2022-11-21 PROBLEM — D22.5 MELANOCYTIC NEVI OF TRUNK: Status: ACTIVE | Noted: 2022-11-21

## 2022-11-21 PROBLEM — D18.01 HEMANGIOMA OF SKIN AND SUBCUTANEOUS TISSUE: Status: ACTIVE | Noted: 2022-11-21

## 2022-11-21 PROCEDURE — 99213 OFFICE O/P EST LOW 20 MIN: CPT

## 2022-11-21 PROCEDURE — ? COUNSELING

## 2022-11-21 PROCEDURE — ? RECOMMENDATIONS

## 2022-11-21 ASSESSMENT — LOCATION ZONE DERM
LOCATION ZONE: EAR
LOCATION ZONE: TRUNK

## 2022-11-21 ASSESSMENT — LOCATION DETAILED DESCRIPTION DERM
LOCATION DETAILED: RIGHT MEDIAL UPPER BACK
LOCATION DETAILED: RIGHT CYMBA CONCHA
LOCATION DETAILED: LEFT MEDIAL UPPER BACK
LOCATION DETAILED: LEFT SUPERIOR MEDIAL UPPER BACK

## 2022-11-21 ASSESSMENT — LOCATION SIMPLE DESCRIPTION DERM
LOCATION SIMPLE: LEFT UPPER BACK
LOCATION SIMPLE: RIGHT UPPER BACK
LOCATION SIMPLE: RIGHT EAR

## 2022-11-21 NOTE — PROCEDURE: RECOMMENDATIONS
Render Risk Assessment In Note?: no
Recommendation Preamble: The following recommendations were made during the visit: cortisone cream OTC
Detail Level: Zone

## 2023-02-16 ENCOUNTER — OFFICE VISIT (OUTPATIENT)
Dept: MEDICAL GROUP | Facility: IMAGING CENTER | Age: 38
End: 2023-02-16
Payer: COMMERCIAL

## 2023-02-16 VITALS
WEIGHT: 150.2 LBS | DIASTOLIC BLOOD PRESSURE: 60 MMHG | BODY MASS INDEX: 26.61 KG/M2 | RESPIRATION RATE: 16 BRPM | HEART RATE: 71 BPM | OXYGEN SATURATION: 97 % | SYSTOLIC BLOOD PRESSURE: 100 MMHG | HEIGHT: 63 IN | TEMPERATURE: 97.6 F

## 2023-02-16 DIAGNOSIS — F41.9 ANXIETY AND DEPRESSION: ICD-10-CM

## 2023-02-16 DIAGNOSIS — Z00.00 WELLNESS EXAMINATION: ICD-10-CM

## 2023-02-16 DIAGNOSIS — F32.A ANXIETY AND DEPRESSION: ICD-10-CM

## 2023-02-16 DIAGNOSIS — Z13.1 DIABETES MELLITUS SCREENING: ICD-10-CM

## 2023-02-16 DIAGNOSIS — Z13.0 SCREENING FOR DEFICIENCY ANEMIA: ICD-10-CM

## 2023-02-16 DIAGNOSIS — Z13.29 SCREENING FOR THYROID DISORDER: ICD-10-CM

## 2023-02-16 DIAGNOSIS — Z23 NEED FOR VACCINATION: ICD-10-CM

## 2023-02-16 DIAGNOSIS — Z86.19 H/O HERPETIC WHITLOW: ICD-10-CM

## 2023-02-16 DIAGNOSIS — Z13.220 SCREENING CHOLESTEROL LEVEL: ICD-10-CM

## 2023-02-16 PROBLEM — F32.1 CURRENT MODERATE EPISODE OF MAJOR DEPRESSIVE DISORDER WITHOUT PRIOR EPISODE (HCC): Status: RESOLVED | Noted: 2021-09-13 | Resolved: 2023-02-16

## 2023-02-16 PROCEDURE — 90686 IIV4 VACC NO PRSV 0.5 ML IM: CPT | Performed by: PHYSICIAN ASSISTANT

## 2023-02-16 PROCEDURE — 99395 PREV VISIT EST AGE 18-39: CPT | Mod: 25 | Performed by: PHYSICIAN ASSISTANT

## 2023-02-16 PROCEDURE — 90471 IMMUNIZATION ADMIN: CPT | Performed by: PHYSICIAN ASSISTANT

## 2023-02-16 RX ORDER — VALACYCLOVIR HYDROCHLORIDE 500 MG/1
500 TABLET, FILM COATED ORAL 2 TIMES DAILY
Qty: 14 TABLET | Refills: 0 | Status: SHIPPED | OUTPATIENT
Start: 2023-02-16 | End: 2023-02-23

## 2023-02-16 RX ORDER — GABAPENTIN 100 MG/1
100 CAPSULE ORAL 3 TIMES DAILY
Qty: 42 CAPSULE | Refills: 0 | Status: SHIPPED | OUTPATIENT
Start: 2023-02-16 | End: 2023-03-02

## 2023-02-16 ASSESSMENT — FIBROSIS 4 INDEX: FIB4 SCORE: 0.76

## 2023-02-16 ASSESSMENT — PATIENT HEALTH QUESTIONNAIRE - PHQ9: CLINICAL INTERPRETATION OF PHQ2 SCORE: 0

## 2023-02-16 NOTE — ASSESSMENT & PLAN NOTE
Pt admits to a history of anxiety and depression - has been seen at [facility] for therapy. Has not been seen in 6 months - but plans to go back soon.

## 2023-02-16 NOTE — PATIENT INSTRUCTIONS
It was a pleasure meeting with you today at [facility]!    Your medical history/records and medications were reviewed today.     UPDATE on MyChart Results: If you have blood work, and/or imaging studies, or any other test or procedure completed, you will have access to results as soon as they become available in MyChart. Recently, these results will be available for review at the same time that your provider is able to see results!    This will likely mean you will see a result before your provider has had a chance to review and discuss with you.  Some results or care notes may be hard to understand and may be serious in nature.    We look at every result and your provider will contact you to explain what they mean and discuss appropriate next steps. Please allow for at least 72 business hours for chart and result review.     We prefer that you wait for your care team to contact you with your results.  Often, your provider will discuss your results with you at your next appointment. We look forward to continuing to partner with you in your care.    Please review my practice information below:    If you have any prescription refill requests, please send them via [email or web address] or discuss with your provider at the start of your office visits. Please allow 3-5 business days for lab and testing review and you will be contacted via [email or web address] with those results, or if advised to make a follow up appointment regarding those results, then please do so.     Once resulted, your lab/test/imaging results will show up automatically in your MyChart. Please wait for my interpretation and recommendations prior to viewing your results to avoid any unnecessary confusion or misinterpretation. I will address all of the lab values that I interpret as abnormal and message you accordingly on your MyChart. I will always send you a message about your results even if they are normal. If you do not hear back from me within 5-7 business  days after completing your tests, then please send me a message on [employer] so I can obtain your results (especially if you went to an outside lab or imaging center - [facility], [facility], etc).     If you have any additional questions or concerns beyond my interpretation of your results, please make an appointment with me to discuss in further detail.    Please only use the [employer] messaging system for questions regarding your most recent appointment or if advised to use otherwise (glucose or blood pressure reporting).     If you have any new problems or concerns, you must make an appointment to discuss. This includes any referral requests, lab requests (unless advised to notify me for pre-appt labs), medication side effects, or request for medication adjustments.     Please arrive 15 minutes prior to your appointment time to complete your check-in and intake with the medical assistant.      Thank you,    Viktoria Castillo PA-C ([place])  Physician Assistant Certified  [facility]    -----------------------------------------------------------------    Attn: Patients of [facility]:    In an effort to continue to provide excellent and efficient care to our patients, it is vital that we continue to use our resources appropriately. With that, this is a reminder that [employer] is used for prescription refill requests, test results, virtual visits, and chart review only.     Any new questions, concerns/conditions, lab/imaging requests, medication adjustments, new prescriptions, or referral requests do require an appointment (virtually or in person), unless discussed otherwise at your most recent appointment.     Thank you for your understanding,    [facility]

## 2023-02-16 NOTE — ASSESSMENT & PLAN NOTE
Patient admits to history of blisters on her left fourth finger.  Feels like a burning sensation down her finger and into her hand.  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

## 2023-02-16 NOTE — PROGRESS NOTES
Subjective:     CC:   Chief Complaint   Patient presents with    Establish Care    Hand Injury     Pt reported  left ring finger- herptic umrray        HPI:   Tamiko presents today to discuss:    Anxiety and depression  Pt admits to a history of anxiety and depression - has been seen at [facility] for therapy. Has not been seen in 6 months - but plans to go back soon.     H/O herpetic wolf  Patient admits to history of blisters on her left fourth finger.  Feels like a burning sensation down her finger and into her hand.  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    Health Maintenance/Anticipatory Guidance:  Diet: balanced  Exercise: 3-5 days per week - weight training  Substance Abuse: no history  Safe in Relationship: yes  Sun Protection/Sunscreen: daily   Dermatologist: ALESSIA  Dentist: twice yearly appointments  Eye Doctor: overdue  Seatbelts, helmets, and gun safety discussed  Trying for a third child, not on birth control    Cancer Screening:  Cervical Cancer: 24 - [place]  Breast Cancer: starting at 41    Infectious Disease Screening:  STI/HIV screening: declined    Immunizations: flu shot    Past Medical History:   Diagnosis Date    Bowel habit changes     constpation    Heart burn     Hypertension     gestational    Indigestion      Family History   Problem Relation Age of Onset    Hyperlipidemia Mother     Diabetes Father     Cancer Father         esophageal    Psychiatric Illness Sister         suicide    Breast Cancer Maternal Aunt      Past Surgical History:   Procedure Laterality Date    REPEAT C SECTION N/A 2020    Procedure:  SECTION, REPEAT;  Surgeon: Elisabeth Constantino M.D.;  Location: LABOR AND DELIVERY;  Service: Obstetrics    PRIMARY C SECTION N/A 2018    Procedure: PRIMARY C SECTION;  Surgeon: Kannan Castillo M.D.;  Location: LABOR AND DELIVERY;  Service: Labor and Delivery    DILATION AND EVACUATION  2017     "Procedure: DILATION AND EVACUATION;  Surgeon: Kannan Castillo M.D.;  Location: SURGERY SAME DAY [facility];  Service:     OTHER  10/2014    Breast augmentation     Social History     Tobacco Use    Smoking status: Former     Packs/day: 0.50     Years: 10.00     Pack years: 5.00     Types: Cigarettes     Quit date: 2016     Years since quittin.2    Smokeless tobacco: Never   Vaping Use    Vaping Use: Never used   Substance Use Topics    Alcohol use: No     Alcohol/week: 0.0 oz     Comment: not while pregnant    Drug use: No     Social History     Social History Narrative    Not on file     Current Outpatient Medications Ordered in Epic   Medication Sig Dispense Refill    valACYclovir (VALTREX) 500 MG Tab Take 1 Tablet by mouth 2 times a day for 7 days. 14 Tablet 0    gabapentin (NEURONTIN) 100 MG Cap Take 1 Capsule by mouth 3 times a day for 14 days. 42 Capsule 0    therapeutic multivitamin-minerals (THERAGRAN-M) Tab Take 1 Tablet by mouth every day.       No current [facility]-ordered facility-administered medications on file.     Percocet [oxycodone-acetaminophen]    PMH/PSH/FH/Social history reviewed.  Vaccinations discussed.  Previous records and labs reviewed. Discussed age appropriate anticipatory guidance.    ROS: see hpi  Gen: no fevers/chills  Pulm: no sob, no cough  CV: no chest pain, no palpitations, no edema  GI: no nausea/vomiting, no diarrhea  Skin: no rash    Objective:   Exam:  /60 (BP Location: Left arm, Patient Position: Sitting, BP Cuff Size: Adult)   Pulse 71   Temp 36.4 °C (97.6 °F) (Temporal)   Resp 16   Ht 1.6 m (5' 3\")   Wt 68.1 kg (150 lb 3.2 oz)   LMP 2023   SpO2 97%   BMI 26.61 kg/m²    Body mass index is 26.61 kg/m².    Gen: Alert and oriented, No apparent distress.  HEENT: Head atraumatic, normocephalic. Pupils equal and round.  Neck: Neck is supple without lymphadenopathy.   Lungs: Normal effort, CTA bilaterally, no wheezes, rhonchi, or rales  CV: Regular rate " and rhythm. No murmurs, rubs, or gallops.  ABD: +BS. Non-tender, non-distended. No rebound, rigidity, or guarding.  Ext: No clubbing, cyanosis, edema. Left fourth finger with small blister with surrounding ecchymosis noted along distal tip.     Assessment & Plan:     37 y.o. female with the following -     1. Wellness examination  PMH/PSH/FH/Social history reviewed.  Vaccinations discussed.  Previous records and labs reviewed. Discussed age appropriate anticipatory guidance.    2. Anxiety and depression  Chronic, controlled and stable.  Recommend reestablishing with therapist for further management.    3. H/O herpetic wolf  Lesion noted on fourth finger with burning sensation.  Check HSV antibodies and start Valtrex for 1 week and gabapentin for 2 weeks.  Side effects of gabapentin include drowsiness.  Do not take with alcohol.  - HSV 1/2 IGG W/ TYPE SPECIFIC RFLX; Future  - valACYclovir (VALTREX) 500 MG Tab; Take 1 Tablet by mouth 2 times a day for 7 days.  Dispense: 14 Tablet; Refill: 0  - gabapentin (NEURONTIN) 100 MG Cap; Take 1 Capsule by mouth 3 times a day for 14 days.  Dispense: 42 Capsule; Refill: 0    4. Screening for thyroid disorder  - TSH WITH REFLEX TO FT4; Future    5. Screening cholesterol level  - Lipid Profile; Future    6. Diabetes mellitus screening  - Comp Metabolic Panel; Future    7. Screening for deficiency anemia  - CBC WITH DIFFERENTIAL; Future    8. Need for vaccination  - INFLUENZA VACCINE QUAD INJ (PF)    Return in about 1 year (around 2/16/2024) for Annual physical.    Viktoria Castillo PA-C ([place])  Physician Assistant Certified  [facility]    Please note that this dictation was created using voice recognition software. I have made every reasonable attempt to correct obvious errors, but I expect that there are errors of grammar and possibly content that I did not discover before finalizing the note.

## 2023-02-17 LAB
ALBUMIN SERPL-MCNC: 4.6 G/DL (ref 3.8–4.8)
ALBUMIN/GLOB SERPL: 1.8 {RATIO} (ref 1.2–2.2)
ALP SERPL-CCNC: 50 IU/L (ref 44–121)
ALT SERPL-CCNC: 15 IU/L (ref 0–32)
AST SERPL-CCNC: 19 IU/L (ref 0–40)
BASOPHILS # BLD AUTO: 0 X10E3/UL (ref 0–0.2)
BASOPHILS NFR BLD AUTO: 0 %
BILIRUB SERPL-MCNC: 0.4 MG/DL (ref 0–1.2)
BUN SERPL-MCNC: 10 MG/DL (ref 6–20)
BUN/CREAT SERPL: 16 (ref 9–23)
CALCIUM SERPL-MCNC: 9.4 MG/DL (ref 8.7–10.2)
CHLORIDE SERPL-SCNC: 105 MMOL/L (ref 96–106)
CHOLEST SERPL-MCNC: 204 MG/DL (ref 100–199)
CO2 SERPL-SCNC: 25 MMOL/L (ref 20–29)
CREAT SERPL-MCNC: 0.64 MG/DL (ref 0.57–1)
EGFRCR SERPLBLD CKD-EPI 2021: 117 ML/MIN/1.73
EOSINOPHIL # BLD AUTO: 0.2 X10E3/UL (ref 0–0.4)
EOSINOPHIL NFR BLD AUTO: 3 %
ERYTHROCYTE [DISTWIDTH] IN BLOOD BY AUTOMATED COUNT: 12.9 % (ref 11.7–15.4)
GLOBULIN SER CALC-MCNC: 2.5 G/DL (ref 1.5–4.5)
GLUCOSE SERPL-MCNC: 103 MG/DL (ref 70–99)
HCT VFR BLD AUTO: 42.9 % (ref 34–46.6)
HDLC SERPL-MCNC: 78 MG/DL
HGB BLD-MCNC: 14.2 G/DL (ref 11.1–15.9)
HIV 1+2 AB+HIV1 P24 AG SERPL QL IA: NON REACTIVE
IMM GRANULOCYTES # BLD AUTO: 0 X10E3/UL (ref 0–0.1)
IMM GRANULOCYTES NFR BLD AUTO: 0 %
IMMATURE CELLS  115398: NORMAL
LABORATORY COMMENT REPORT: ABNORMAL
LDLC SERPL CALC-MCNC: 115 MG/DL (ref 0–99)
LYMPHOCYTES # BLD AUTO: 1.4 X10E3/UL (ref 0.7–3.1)
LYMPHOCYTES NFR BLD AUTO: 24 %
MCH RBC QN AUTO: 30.3 PG (ref 26.6–33)
MCHC RBC AUTO-ENTMCNC: 33.1 G/DL (ref 31.5–35.7)
MCV RBC AUTO: 92 FL (ref 79–97)
MONOCYTES # BLD AUTO: 0.5 X10E3/UL (ref 0.1–0.9)
MONOCYTES NFR BLD AUTO: 8 %
MORPHOLOGY BLD-IMP: NORMAL
NEUTROPHILS # BLD AUTO: 3.8 X10E3/UL (ref 1.4–7)
NEUTROPHILS NFR BLD AUTO: 65 %
NRBC BLD AUTO-RTO: NORMAL %
PLATELET # BLD AUTO: 246 X10E3/UL (ref 150–450)
POTASSIUM SERPL-SCNC: 4.7 MMOL/L (ref 3.5–5.2)
PROT SERPL-MCNC: 7.1 G/DL (ref 6–8.5)
RBC # BLD AUTO: 4.69 X10E6/UL (ref 3.77–5.28)
SODIUM SERPL-SCNC: 142 MMOL/L (ref 134–144)
TRIGL SERPL-MCNC: 63 MG/DL (ref 0–149)
TSH SERPL DL<=0.005 MIU/L-ACNC: 0.79 UIU/ML (ref 0.45–4.5)
VLDLC SERPL CALC-MCNC: 11 MG/DL (ref 5–40)
WBC # BLD AUTO: 5.9 X10E3/UL (ref 3.4–10.8)

## 2023-05-24 ENCOUNTER — APPOINTMENT (OUTPATIENT)
Dept: ADMISSIONS | Facility: MEDICAL CENTER | Age: 38
End: 2023-05-24
Attending: OBSTETRICS & GYNECOLOGY
Payer: COMMERCIAL

## 2023-05-25 ENCOUNTER — ANESTHESIA EVENT (OUTPATIENT)
Dept: SURGERY | Facility: MEDICAL CENTER | Age: 38
End: 2023-05-25
Payer: COMMERCIAL

## 2023-05-25 ENCOUNTER — HOSPITAL ENCOUNTER (OUTPATIENT)
Facility: MEDICAL CENTER | Age: 38
End: 2023-05-25
Attending: OBSTETRICS & GYNECOLOGY | Admitting: OBSTETRICS & GYNECOLOGY
Payer: COMMERCIAL

## 2023-05-25 ENCOUNTER — ANESTHESIA (OUTPATIENT)
Dept: SURGERY | Facility: MEDICAL CENTER | Age: 38
End: 2023-05-25
Payer: COMMERCIAL

## 2023-05-25 VITALS
RESPIRATION RATE: 18 BRPM | BODY MASS INDEX: 25.34 KG/M2 | DIASTOLIC BLOOD PRESSURE: 71 MMHG | HEART RATE: 51 BPM | WEIGHT: 152.12 LBS | SYSTOLIC BLOOD PRESSURE: 110 MMHG | OXYGEN SATURATION: 97 % | TEMPERATURE: 97.7 F | HEIGHT: 65 IN

## 2023-05-25 LAB
ABO GROUP BLD: NORMAL
ALBUMIN SERPL BCP-MCNC: 4 G/DL (ref 3.2–4.9)
ALBUMIN/GLOB SERPL: 1.7 G/DL
ALP SERPL-CCNC: 35 U/L (ref 30–99)
ALT SERPL-CCNC: 13 U/L (ref 2–50)
ANION GAP SERPL CALC-SCNC: 14 MMOL/L (ref 7–16)
AST SERPL-CCNC: 10 U/L (ref 12–45)
BILIRUB SERPL-MCNC: 0.5 MG/DL (ref 0.1–1.5)
BLD GP AB SCN SERPL QL: NORMAL
BUN SERPL-MCNC: 6 MG/DL (ref 8–22)
CALCIUM ALBUM COR SERPL-MCNC: 9.1 MG/DL (ref 8.5–10.5)
CALCIUM SERPL-MCNC: 9.1 MG/DL (ref 8.5–10.5)
CHLORIDE SERPL-SCNC: 106 MMOL/L (ref 96–112)
CO2 SERPL-SCNC: 18 MMOL/L (ref 20–33)
CREAT SERPL-MCNC: 0.49 MG/DL (ref 0.5–1.4)
ERYTHROCYTE [DISTWIDTH] IN BLOOD BY AUTOMATED COUNT: 38.8 FL (ref 35.9–50)
GFR SERPLBLD CREATININE-BSD FMLA CKD-EPI: 124 ML/MIN/1.73 M 2
GLOBULIN SER CALC-MCNC: 2.4 G/DL (ref 1.9–3.5)
GLUCOSE SERPL-MCNC: 90 MG/DL (ref 65–99)
HCT VFR BLD AUTO: 39.2 % (ref 37–47)
HGB BLD-MCNC: 13.7 G/DL (ref 12–16)
MCH RBC QN AUTO: 31.1 PG (ref 27–33)
MCHC RBC AUTO-ENTMCNC: 34.9 G/DL (ref 32.2–35.5)
MCV RBC AUTO: 89.1 FL (ref 81.4–97.8)
PATHOLOGY CONSULT NOTE: NORMAL
PLATELET # BLD AUTO: 251 K/UL (ref 164–446)
PMV BLD AUTO: 9.1 FL (ref 9–12.9)
POTASSIUM SERPL-SCNC: 3.7 MMOL/L (ref 3.6–5.5)
PROT SERPL-MCNC: 6.4 G/DL (ref 6–8.2)
RBC # BLD AUTO: 4.4 M/UL (ref 4.2–5.4)
RH BLD: NORMAL
SODIUM SERPL-SCNC: 138 MMOL/L (ref 135–145)
WBC # BLD AUTO: 7.6 K/UL (ref 4.8–10.8)

## 2023-05-25 PROCEDURE — 88262 CHROMOSOME ANALYSIS 15-20: CPT

## 2023-05-25 PROCEDURE — 86900 BLOOD TYPING SEROLOGIC ABO: CPT

## 2023-05-25 PROCEDURE — 85027 COMPLETE CBC AUTOMATED: CPT

## 2023-05-25 PROCEDURE — 700102 HCHG RX REV CODE 250 W/ 637 OVERRIDE(OP): Performed by: ANESTHESIOLOGY

## 2023-05-25 PROCEDURE — 36415 COLL VENOUS BLD VENIPUNCTURE: CPT

## 2023-05-25 PROCEDURE — 160048 HCHG OR STATISTICAL LEVEL 1-5: Performed by: OBSTETRICS & GYNECOLOGY

## 2023-05-25 PROCEDURE — 86850 RBC ANTIBODY SCREEN: CPT

## 2023-05-25 PROCEDURE — 700105 HCHG RX REV CODE 258: Performed by: ANESTHESIOLOGY

## 2023-05-25 PROCEDURE — 160025 RECOVERY II MINUTES (STATS): Performed by: OBSTETRICS & GYNECOLOGY

## 2023-05-25 PROCEDURE — 88305 TISSUE EXAM BY PATHOLOGIST: CPT

## 2023-05-25 PROCEDURE — 80053 COMPREHEN METABOLIC PANEL: CPT

## 2023-05-25 PROCEDURE — 160002 HCHG RECOVERY MINUTES (STAT): Performed by: OBSTETRICS & GYNECOLOGY

## 2023-05-25 PROCEDURE — 160029 HCHG SURGERY MINUTES - 1ST 30 MINS LEVEL 4: Performed by: OBSTETRICS & GYNECOLOGY

## 2023-05-25 PROCEDURE — A9270 NON-COVERED ITEM OR SERVICE: HCPCS | Performed by: ANESTHESIOLOGY

## 2023-05-25 PROCEDURE — 160041 HCHG SURGERY MINUTES - EA ADDL 1 MIN LEVEL 4: Performed by: OBSTETRICS & GYNECOLOGY

## 2023-05-25 PROCEDURE — 88233 TISSUE CULTURE SKIN/BIOPSY: CPT

## 2023-05-25 PROCEDURE — 86901 BLOOD TYPING SEROLOGIC RH(D): CPT

## 2023-05-25 PROCEDURE — 160009 HCHG ANES TIME/MIN: Performed by: OBSTETRICS & GYNECOLOGY

## 2023-05-25 PROCEDURE — 160035 HCHG PACU - 1ST 60 MINS PHASE I: Performed by: OBSTETRICS & GYNECOLOGY

## 2023-05-25 PROCEDURE — 700111 HCHG RX REV CODE 636 W/ 250 OVERRIDE (IP): Performed by: ANESTHESIOLOGY

## 2023-05-25 PROCEDURE — 01965 ANES INCOMPL/MISSED AB PX: CPT | Performed by: ANESTHESIOLOGY

## 2023-05-25 PROCEDURE — 160046 HCHG PACU - 1ST 60 MINS PHASE II: Performed by: OBSTETRICS & GYNECOLOGY

## 2023-05-25 RX ORDER — DIPHENHYDRAMINE HYDROCHLORIDE 50 MG/ML
12.5 INJECTION INTRAMUSCULAR; INTRAVENOUS
Status: DISCONTINUED | OUTPATIENT
Start: 2023-05-25 | End: 2023-05-25 | Stop reason: HOSPADM

## 2023-05-25 RX ORDER — ONDANSETRON 2 MG/ML
INJECTION INTRAMUSCULAR; INTRAVENOUS PRN
Status: DISCONTINUED | OUTPATIENT
Start: 2023-05-25 | End: 2023-05-25 | Stop reason: SURG

## 2023-05-25 RX ORDER — SODIUM CHLORIDE, SODIUM LACTATE, POTASSIUM CHLORIDE, CALCIUM CHLORIDE 600; 310; 30; 20 MG/100ML; MG/100ML; MG/100ML; MG/100ML
INJECTION, SOLUTION INTRAVENOUS
Status: DISCONTINUED | OUTPATIENT
Start: 2023-05-25 | End: 2023-05-25 | Stop reason: SURG

## 2023-05-25 RX ORDER — ONDANSETRON 2 MG/ML
4 INJECTION INTRAMUSCULAR; INTRAVENOUS
Status: DISCONTINUED | OUTPATIENT
Start: 2023-05-25 | End: 2023-05-25 | Stop reason: HOSPADM

## 2023-05-25 RX ORDER — DEXAMETHASONE SODIUM PHOSPHATE 4 MG/ML
INJECTION, SOLUTION INTRA-ARTICULAR; INTRALESIONAL; INTRAMUSCULAR; INTRAVENOUS; SOFT TISSUE PRN
Status: DISCONTINUED | OUTPATIENT
Start: 2023-05-25 | End: 2023-05-25 | Stop reason: SURG

## 2023-05-25 RX ORDER — CEFAZOLIN SODIUM 1 G/3ML
INJECTION, POWDER, FOR SOLUTION INTRAMUSCULAR; INTRAVENOUS PRN
Status: DISCONTINUED | OUTPATIENT
Start: 2023-05-25 | End: 2023-05-25 | Stop reason: SURG

## 2023-05-25 RX ORDER — KETOROLAC TROMETHAMINE 30 MG/ML
INJECTION, SOLUTION INTRAMUSCULAR; INTRAVENOUS PRN
Status: DISCONTINUED | OUTPATIENT
Start: 2023-05-25 | End: 2023-05-25 | Stop reason: SURG

## 2023-05-25 RX ORDER — METHYLERGONOVINE MALEATE 0.2 MG/ML
INJECTION INTRAVENOUS
Status: DISCONTINUED
Start: 2023-05-25 | End: 2023-05-25 | Stop reason: HOSPADM

## 2023-05-25 RX ORDER — HYDROMORPHONE HYDROCHLORIDE 1 MG/ML
0.4 INJECTION, SOLUTION INTRAMUSCULAR; INTRAVENOUS; SUBCUTANEOUS
Status: DISCONTINUED | OUTPATIENT
Start: 2023-05-25 | End: 2023-05-25 | Stop reason: HOSPADM

## 2023-05-25 RX ORDER — MEPERIDINE HYDROCHLORIDE 25 MG/ML
6.25 INJECTION INTRAMUSCULAR; INTRAVENOUS; SUBCUTANEOUS
Status: DISCONTINUED | OUTPATIENT
Start: 2023-05-25 | End: 2023-05-25 | Stop reason: HOSPADM

## 2023-05-25 RX ORDER — HALOPERIDOL 5 MG/ML
1 INJECTION INTRAMUSCULAR
Status: DISCONTINUED | OUTPATIENT
Start: 2023-05-25 | End: 2023-05-25 | Stop reason: HOSPADM

## 2023-05-25 RX ORDER — HYDROMORPHONE HYDROCHLORIDE 1 MG/ML
0.1 INJECTION, SOLUTION INTRAMUSCULAR; INTRAVENOUS; SUBCUTANEOUS
Status: DISCONTINUED | OUTPATIENT
Start: 2023-05-25 | End: 2023-05-25 | Stop reason: HOSPADM

## 2023-05-25 RX ORDER — HYDROMORPHONE HYDROCHLORIDE 1 MG/ML
0.2 INJECTION, SOLUTION INTRAMUSCULAR; INTRAVENOUS; SUBCUTANEOUS
Status: DISCONTINUED | OUTPATIENT
Start: 2023-05-25 | End: 2023-05-25 | Stop reason: HOSPADM

## 2023-05-25 RX ORDER — MISOPROSTOL 200 UG/1
TABLET ORAL
Status: DISCONTINUED
Start: 2023-05-25 | End: 2023-05-25 | Stop reason: HOSPADM

## 2023-05-25 RX ORDER — CARBOPROST TROMETHAMINE 250 UG/ML
INJECTION, SOLUTION INTRAMUSCULAR
Status: DISCONTINUED
Start: 2023-05-25 | End: 2023-05-25 | Stop reason: HOSPADM

## 2023-05-25 RX ADMIN — SODIUM CHLORIDE, POTASSIUM CHLORIDE, SODIUM LACTATE AND CALCIUM CHLORIDE: 600; 310; 30; 20 INJECTION, SOLUTION INTRAVENOUS at 13:57

## 2023-05-25 RX ADMIN — HYDROCODONE BITARTRATE AND ACETAMINOPHEN 15 MG: 7.5; 325 SOLUTION ORAL at 15:17

## 2023-05-25 RX ADMIN — PROPOFOL 100 MCG/KG/MIN: 10 INJECTION, EMULSION INTRAVENOUS at 14:01

## 2023-05-25 RX ADMIN — DEXAMETHASONE SODIUM PHOSPHATE 4 MG: 4 INJECTION INTRA-ARTICULAR; INTRALESIONAL; INTRAMUSCULAR; INTRAVENOUS; SOFT TISSUE at 14:07

## 2023-05-25 RX ADMIN — KETOROLAC TROMETHAMINE 30 MG: 30 INJECTION, SOLUTION INTRAMUSCULAR; INTRAVENOUS at 14:18

## 2023-05-25 RX ADMIN — FENTANYL CITRATE 50 MCG: 50 INJECTION, SOLUTION INTRAMUSCULAR; INTRAVENOUS at 14:14

## 2023-05-25 RX ADMIN — ONDANSETRON 4 MG: 2 INJECTION INTRAMUSCULAR; INTRAVENOUS at 14:17

## 2023-05-25 RX ADMIN — CEFAZOLIN 2 G: 1 INJECTION, POWDER, FOR SOLUTION INTRAMUSCULAR; INTRAVENOUS at 14:08

## 2023-05-25 RX ADMIN — FENTANYL CITRATE 50 MCG: 50 INJECTION, SOLUTION INTRAMUSCULAR; INTRAVENOUS at 14:16

## 2023-05-25 RX ADMIN — FENTANYL CITRATE 50 MCG: 50 INJECTION, SOLUTION INTRAMUSCULAR; INTRAVENOUS at 14:01

## 2023-05-25 ASSESSMENT — PAIN DESCRIPTION - PAIN TYPE
TYPE: SURGICAL PAIN

## 2023-05-25 ASSESSMENT — PAIN SCALES - GENERAL: PAIN_LEVEL: 1

## 2023-05-25 ASSESSMENT — FIBROSIS 4 INDEX: FIB4 SCORE: 0.74

## 2023-05-25 NOTE — ANESTHESIA TIME REPORT
Anesthesia Start and Stop Event Times     Date Time Event    5/25/2023 01:16 PM Ready for Procedure    5/25/2023 01:57 PM Anesthesia Start    5/25/2023 02:34 PM Anesthesia Stop        Responsible Staff  05/25/23    Name Role Begin End    Tammie Reyna M.D. Anesthesiologist 05/25/23 01:57 PM 05/25/23 02:34 PM        Overtime Reason:  no overtime (within assigned shift)    Comments:

## 2023-05-25 NOTE — ANESTHESIA PREPROCEDURE EVALUATION
Case: [id number] Date/Time: 23 1345    Procedure: SUCTION DILATION AND CURETTAGE    Pre-op diagnosis: MISSED     Location: [facility] ROOM 28 / SURGERY SAME DAY [facility]    Surgeons: Elisabeth Constantino M.D.          Relevant Problems   No relevant active problems       Physical Exam    Airway   Mallampati: II  TM distance: >3 FB  Neck ROM: full       Cardiovascular - normal exam  Rhythm: regular  Rate: normal  (-) murmur     Dental - normal exam           Pulmonary - normal exam  Breath sounds clear to auscultation     Abdominal    Neurological - normal exam                 Anesthesia Plan    ASA 1       Plan - general       Airway plan will be LMA        Plan Factors:   Patient was previously instructed to abstain from smoking on day of procedure.  Patient did not smoke on day of procedure.      Induction: intravenous      Pertinent diagnostic labs and testing reviewed    Informed Consent:    Anesthetic plan and risks discussed with patient.    Use of blood products discussed with: patient whom consented to blood products.

## 2023-05-25 NOTE — ANESTHESIA POSTPROCEDURE EVALUATION
Patient: Tamiko Hilton    Procedure Summary     Date: 05/25/23 Room / Location: [facility] ROOM 28 / SURGERY SAME DAY [facility]    Anesthesia Start: 1357 Anesthesia Stop: 1434    Procedure: SUCTION DILATION AND CURETTAGE (Uterus) Diagnosis: (6-7 week mmissed aboration, recurrent miscarriages )    Surgeons: Elisabeth Constantino M.D. Responsible Provider: Tammie Reyna M.D.    Anesthesia Type: general ASA Status: 1          Final Anesthesia Type: general  Last vitals  BP   Blood Pressure: 98/50    Temp   36.5 °C (97.7 °F)    Pulse   (Abnormal) 57   Resp   12    SpO2   98 %      Anesthesia Post Evaluation    Patient location during evaluation: PACU  Patient participation: complete - patient participated  Level of consciousness: awake and alert  Pain score: 1    Airway patency: patent  Anesthetic complications: no  Cardiovascular status: hemodynamically stable  Respiratory status: acceptable  Hydration status: euvolemic    PONV: none          No notable events documented.     Nurse Pain Score: 2 (NPRS)

## 2023-05-25 NOTE — OR NURSING
1431 - Pt to PACU 6 from OR.  Bedside report from anesthesiologist and RN.  Attached to monitoring, VSS, breathing is calm and unlabored.     1435- Report to April RN

## 2023-05-25 NOTE — ANESTHESIA PROCEDURE NOTES
Airway    Date/Time: 5/25/2023 2:04 PM    Performed by: Tammie Reyna M.D.  Authorized by: Tammie Reyna M.D.    Location:  OR  Urgency:  Elective  Indications for Airway Management:  Anesthesia      Spontaneous Ventilation: absent    Sedation Level:  Deep  Preoxygenated: Yes    MILS Maintained Throughout: No    Mask Difficulty Assessment:  1 - vent by mask  Final Airway Type:  Supraglottic airway  Final Supraglottic Airway:  Standard LMA    SGA Size:  4  Number of Attempts at Approach:  1  Ventilation Between Attempts:  BVM  Number of Other Approaches Attempted:  0

## 2023-05-25 NOTE — OR NURSING
1435- Report from Jennifer JACKSON, care assumed    1440- Pt waking up. Reports small amount of cramping. Heat pack applied. Dr. Constantino at bedside    1452- Pt's  at bedside    1500- Dr. Burton called for order for oral pain medication. Pt reports reaction from oxycodone, but has done fine with hydrocodone in the past. Telephone order obtained for PO Hycet.     1510- Pt ambulated to bathroom. Pt able to void without difficulty. Dressed with assistance of .    1517- PO hycet given    1530- Discharge instructions discussed with pt and pt's . All questions answered. Verbalized understanding. Phase II criteria met

## 2023-05-25 NOTE — DISCHARGE INSTRUCTIONS
What to Expect Post Anesthesia    Rest and take it easy for the first 24 hours.  A responsible adult is recommended to remain with you during that time.  It is normal to feel sleepy.  We encourage you to not do anything that requires balance, judgment or coordination.    FOR 24 HOURS DO NOT:  Drive, operate machinery or run household appliances.  Drink beer or alcoholic beverages.  Make important decisions or sign legal documents.    To avoid nausea, slowly advance diet as tolerated, avoiding spicy or greasy foods for the first day.  Add more substantial food to your diet according to your provider's instructions.  INCREASE FLUIDS AND FIBER TO AVOID CONSTIPATION.    MILD FLU-LIKE SYMPTOMS ARE NORMAL.  YOU MAY EXPERIENCE GENERALIZED MUSCLE ACHES, THROAT IRRITATION, HEADACHE AND/OR SOME NAUSEA.    If any questions arise, call your provider.    If your provider is not available, please feel free to call the Surgical Center at [phone].    MEDICATIONS: Resume taking daily medication.  Take prescribed pain medication with food.  If no medication is prescribed, you may take non-aspirin pain medication if needed.  PAIN MEDICATION CAN BE VERY CONSTIPATING.  Take a stool softener or laxative such as senokot, pericolace, or milk of magnesia if needed.    Last pain medication given at ___________    Toradol (NSAID similar to ibuprofen/Motrin) given at 2:15pm. You may take ibuprofen if needed at 8:15pm.

## 2023-05-25 NOTE — OP REPORT
DATE OF SERVICE:  23    PREOPERATIVE DIAGNOSIS:  6 week missed , recurrent miscarriages    POSTOPERATIVE DIAGNOSIS:  same    PROCEDURE PERFORMED:  Suction dilation and curettage    SURGEON:  Elisabeth Constantino MD.    ASSISTANT:  none    ANESTHESIA:  General endotracheal anesthesia.    ANESTHESIOLOGIST:  Tammie Reyna MD    ESTIMATED BLOOD LOSS:  50 mL    FINDINGS: normal vagina and cervix, uterus sounded to 11cm, products of conception w/in endometrial cavity    COMPLICATIONS:  None.    PROCEDURE:  After appropriate consents were obtained, the patient was taken to the operating room where general endotracheal anesthesia was applied without difficulty.  She was prepped and draped in the usual sterile fashion. The patient was placed in the dorsal lithotomy position with Tommy stirrups. A metal bivalve speculum was placed into the vagina. The cervix was grasped with a single tooth tenaculum. The uterus was sounded to a depth of 11cm. The cervix was then progressively dilated using Hegar dilators to #7-8. A suction device #7 curved curette was obtained and placed under 60mmhg pressure. It was advanced to the fundus of the uterus. The contents of the uterus was evacuated. Sharp curettage was also performed until a cry was noted throughout. The uterus clamped down well without bleeding and was firm. Once all the products were visualized removed, the curette was removed from the uterus and the tenaculum was removed from the cervix. Excellent hemostasis was noted. The patient tolerated procedure well. Sponge, lap and needle counts were correct x 2. She was taken to recovery in stable condition.    Blood Type: AB+           ____________________________________     Elisabeth Constantino MD

## 2023-06-02 LAB — TEST NAME 95000: NORMAL

## 2023-09-29 ENCOUNTER — APPOINTMENT (RX ONLY)
Dept: URBAN - METROPOLITAN AREA CLINIC 4 | Facility: CLINIC | Age: 38
Setting detail: DERMATOLOGY
End: 2023-09-29

## 2023-09-29 DIAGNOSIS — Z85.828 PERSONAL HISTORY OF OTHER MALIGNANT NEOPLASM OF SKIN: ICD-10-CM

## 2023-09-29 DIAGNOSIS — Z71.89 OTHER SPECIFIED COUNSELING: ICD-10-CM

## 2023-09-29 DIAGNOSIS — L82.1 OTHER SEBORRHEIC KERATOSIS: ICD-10-CM

## 2023-09-29 DIAGNOSIS — L81.4 OTHER MELANIN HYPERPIGMENTATION: ICD-10-CM

## 2023-09-29 DIAGNOSIS — D22 MELANOCYTIC NEVI: ICD-10-CM

## 2023-09-29 DIAGNOSIS — D18.0 HEMANGIOMA: ICD-10-CM

## 2023-09-29 PROBLEM — D18.01 HEMANGIOMA OF SKIN AND SUBCUTANEOUS TISSUE: Status: ACTIVE | Noted: 2023-09-29

## 2023-09-29 PROBLEM — D48.5 NEOPLASM OF UNCERTAIN BEHAVIOR OF SKIN: Status: ACTIVE | Noted: 2023-09-29

## 2023-09-29 PROBLEM — D22.5 MELANOCYTIC NEVI OF TRUNK: Status: ACTIVE | Noted: 2023-09-29

## 2023-09-29 PROCEDURE — 11103 TANGNTL BX SKIN EA SEP/ADDL: CPT

## 2023-09-29 PROCEDURE — 11102 TANGNTL BX SKIN SINGLE LES: CPT

## 2023-09-29 PROCEDURE — ? BIOPSY BY SHAVE METHOD

## 2023-09-29 PROCEDURE — 99213 OFFICE O/P EST LOW 20 MIN: CPT | Mod: 25

## 2023-09-29 PROCEDURE — ? SUNSCREEN RECOMMENDATIONS

## 2023-09-29 PROCEDURE — ? COUNSELING

## 2023-09-29 ASSESSMENT — LOCATION SIMPLE DESCRIPTION DERM
LOCATION SIMPLE: RIGHT SHOULDER
LOCATION SIMPLE: LEFT SHOULDER
LOCATION SIMPLE: ABDOMEN
LOCATION SIMPLE: LOWER BACK
LOCATION SIMPLE: LEFT THIGH
LOCATION SIMPLE: UPPER BACK

## 2023-09-29 ASSESSMENT — LOCATION DETAILED DESCRIPTION DERM
LOCATION DETAILED: INFERIOR THORACIC SPINE
LOCATION DETAILED: LEFT POSTERIOR SHOULDER
LOCATION DETAILED: SUPERIOR LUMBAR SPINE
LOCATION DETAILED: EPIGASTRIC SKIN
LOCATION DETAILED: RIGHT POSTERIOR SHOULDER
LOCATION DETAILED: LEFT ANTERIOR PROXIMAL THIGH

## 2023-09-29 ASSESSMENT — LOCATION ZONE DERM
LOCATION ZONE: ARM
LOCATION ZONE: LEG
LOCATION ZONE: TRUNK

## 2023-09-29 NOTE — PROCEDURE: BIOPSY BY SHAVE METHOD
Detail Level: Detailed
Depth Of Biopsy: dermis
Was A Bandage Applied: Yes
Size Of Lesion In Cm: 0.5
X Size Of Lesion In Cm: 0
Biopsy Type: H and E
Biopsy Method: Personna blade
Anesthesia Type: 1% lidocaine with epinephrine
Anesthesia Volume In Cc: 2
Hemostasis: Drysol
Wound Care: Bacitracin
Dressing: bandage
Destruction After The Procedure: No
Type Of Destruction Used: Curettage
Curettage Text: The wound bed was treated with curettage after the biopsy was performed.
Cryotherapy Text: The wound bed was treated with cryotherapy after the biopsy was performed.
Electrodesiccation Text: The wound bed was treated with electrodesiccation after the biopsy was performed.
Electrodesiccation And Curettage Text: The wound bed was treated with electrodesiccation and curettage after the biopsy was performed.
Silver Nitrate Text: The wound bed was treated with silver nitrate after the biopsy was performed.
Lab: [id number]
Lab Facility: 
Consent: Written consent was obtained and risks were reviewed including but not limited to scarring, infection, bleeding, scabbing, incomplete removal, nerve damage and allergy to anesthesia.
Post-Care Instructions: I reviewed with the patient in detail post-care instructions. Patient is to keep the biopsy site dry overnight, and then apply bacitracin twice daily until healed. Patient may apply hydrogen peroxide soaks to remove any crusting.
Notification Instructions: Patient will be notified of biopsy results. However, patient instructed to call the office if not contacted within 2 weeks.
Billing Type: Third-Party Bill
Information: Selecting Yes will display possible errors in your note based on the variables you have selected. This validation is only offered as a suggestion for you. PLEASE NOTE THAT THE VALIDATION TEXT WILL BE REMOVED WHEN YOU FINALIZE YOUR NOTE. IF YOU WANT TO FAX A PRELIMINARY NOTE YOU WILL NEED TO TOGGLE THIS TO 'NO' IF YOU DO NOT WANT IT IN YOUR FAXED NOTE.
Size Of Lesion In Cm: 0.7

## 2024-02-09 ENCOUNTER — APPOINTMENT (RX ONLY)
Dept: URBAN - METROPOLITAN AREA CLINIC 4 | Facility: CLINIC | Age: 39
Setting detail: DERMATOLOGY
End: 2024-02-09

## 2024-02-09 DIAGNOSIS — D22 MELANOCYTIC NEVI: ICD-10-CM

## 2024-02-09 PROBLEM — D48.5 NEOPLASM OF UNCERTAIN BEHAVIOR OF SKIN: Status: ACTIVE | Noted: 2024-02-09

## 2024-02-09 PROBLEM — D22.72 MELANOCYTIC NEVI OF LEFT LOWER LIMB, INCLUDING HIP: Status: ACTIVE | Noted: 2024-02-09

## 2024-02-09 PROCEDURE — ? COUNSELING

## 2024-02-09 PROCEDURE — ? ADDITIONAL NOTES

## 2024-02-09 PROCEDURE — ? BIOPSY BY SHAVE METHOD

## 2024-02-09 PROCEDURE — 11102 TANGNTL BX SKIN SINGLE LES: CPT

## 2024-02-09 PROCEDURE — 99212 OFFICE O/P EST SF 10 MIN: CPT | Mod: 25

## 2024-02-09 ASSESSMENT — LOCATION DETAILED DESCRIPTION DERM
LOCATION DETAILED: INFERIOR THORACIC SPINE
LOCATION DETAILED: LEFT ANTERIOR PROXIMAL THIGH

## 2024-02-09 ASSESSMENT — LOCATION ZONE DERM
LOCATION ZONE: TRUNK
LOCATION ZONE: LEG

## 2024-02-09 ASSESSMENT — LOCATION SIMPLE DESCRIPTION DERM
LOCATION SIMPLE: LEFT THIGH
LOCATION SIMPLE: UPPER BACK

## 2024-02-09 NOTE — PROCEDURE: ADDITIONAL NOTES
Detail Level: Simple
Render Risk Assessment In Note?: no
Additional Notes: Will discuss with Dr. Rodriguez or Dr. Berry if pt should have nevus excised due to pt being 9 weeks pregnant  Dr. Rodriguez does not feel excising is necessary.  Informed patient if she feels like it is changing she is to call.

## 2024-02-09 NOTE — PROCEDURE: BIOPSY BY SHAVE METHOD
Detail Level: Detailed
Depth Of Biopsy: dermis
Was A Bandage Applied: Yes
Size Of Lesion In Cm: 0.5
X Size Of Lesion In Cm: 0
Biopsy Type: H and E
Biopsy Method: Personna blade
Anesthesia Type: 1% lidocaine without epinephrine
Anesthesia Volume In Cc: 1
Hemostasis: Aluminum Chloride and Electrocautery
Wound Care: Vaseline
Dressing: bandage
Destruction After The Procedure: No
Type Of Destruction Used: Curettage
Curettage Text: The wound bed was treated with curettage after the biopsy was performed.
Cryotherapy Text: The wound bed was treated with cryotherapy after the biopsy was performed.
Electrodesiccation Text: The wound bed was treated with electrodesiccation after the biopsy was performed.
Electrodesiccation And Curettage Text: The wound bed was treated with electrodesiccation and curettage after the biopsy was performed.
Silver Nitrate Text: The wound bed was treated with silver nitrate after the biopsy was performed.
Lab: [id number]
Lab Facility: 
Consent: Written consent was obtained and risks were reviewed including but not limited to scarring, infection, bleeding, scabbing, incomplete removal, nerve damage and allergy to anesthesia.
Post-Care Instructions: I reviewed with the patient in detail post-care instructions. Patient is to keep the biopsy site dry overnight, and then apply bacitracin twice daily until healed. Patient may apply hydrogen peroxide soaks to remove any crusting.
Notification Instructions: Patient will be notified of biopsy results. However, patient instructed to call the office if not contacted within 2 weeks.
Billing Type: Third-Party Bill
Information: Selecting Yes will display possible errors in your note based on the variables you have selected. This validation is only offered as a suggestion for you. PLEASE NOTE THAT THE VALIDATION TEXT WILL BE REMOVED WHEN YOU FINALIZE YOUR NOTE. IF YOU WANT TO FAX A PRELIMINARY NOTE YOU WILL NEED TO TOGGLE THIS TO 'NO' IF YOU DO NOT WANT IT IN YOUR FAXED NOTE.

## 2024-08-05 ENCOUNTER — APPOINTMENT (OUTPATIENT)
Dept: ADMISSIONS | Facility: MEDICAL CENTER | Age: 39
End: 2024-08-05
Payer: COMMERCIAL

## 2024-08-09 ENCOUNTER — PRE-ADMISSION TESTING (OUTPATIENT)
Dept: ADMISSIONS | Facility: MEDICAL CENTER | Age: 39
End: 2024-08-09
Attending: OBSTETRICS & GYNECOLOGY
Payer: COMMERCIAL

## 2024-08-09 RX ORDER — ASPIRIN 81 MG/1
81 TABLET ORAL DAILY
COMMUNITY

## 2024-08-15 ENCOUNTER — HOSPITAL ENCOUNTER (EMERGENCY)
Facility: MEDICAL CENTER | Age: 39
End: 2024-08-15
Attending: OBSTETRICS & GYNECOLOGY | Admitting: OBSTETRICS & GYNECOLOGY
Payer: COMMERCIAL

## 2024-08-15 VITALS
DIASTOLIC BLOOD PRESSURE: 84 MMHG | HEART RATE: 114 BPM | WEIGHT: 183 LBS | TEMPERATURE: 98 F | RESPIRATION RATE: 17 BRPM | SYSTOLIC BLOOD PRESSURE: 122 MMHG | BODY MASS INDEX: 32.43 KG/M2 | OXYGEN SATURATION: 96 % | HEIGHT: 63 IN

## 2024-08-15 DIAGNOSIS — G89.18 POSTOPERATIVE PAIN: ICD-10-CM

## 2024-08-15 PROCEDURE — 99282 EMERGENCY DEPT VISIT SF MDM: CPT

## 2024-08-15 PROCEDURE — 59025 FETAL NON-STRESS TEST: CPT

## 2024-08-15 ASSESSMENT — PAIN SCALES - GENERAL
PAINLEVEL: 0 - NO PAIN
PAINLEVEL: 0 - NO PAIN

## 2024-08-15 ASSESSMENT — FIBROSIS 4 INDEX
FIB4 SCORE: 0.42
FIB4 SCORE: 0.42

## 2024-08-16 NOTE — ED PROVIDER NOTES
OB ED triage note    Date: 8/15/2024    Patient ID: 39-year-old  7 para 2-0-4-2 at 36+0 weeks (EDC 2024)    Chief complaint: Possible vaginal bleeding.    History of present illness: The patient has prenatal care with Dr. Saint Pierre.  Her pregnancy has been complicated by a history of  section x 2 as well as advanced maternal age.  She also has chronic hypertension.  She has not had a subchorionic bleed since 9 weeks.  She is scheduled for repeat  section on 9/3.    She presented to labor and delivery with a chief complaint of possible vaginal bleeding.  She has a known history of bleeding with bowel movements due to lichen sclerosis of the area.  She reports that she went to the bathroom this evening and wiped and had spotting on the toilet paper.  She has not had any bright red vaginal bleeding.  She denies spontaneous rupture of membranes or contractions.  She endorses positive fetal movement.    Past medical history: Anemia, lichen sclerosis, hypertension.    Past surgical history: Breast augmentation,  section x 2, dilation curettage, LEEP procedure, wisdom teeth removal.    GYN history: Last menstrual period 2023.  The patient has a history of HPV as well as chlamydia.  She denies a history of genital herpes.    OB history: X 2 elective terminations, x 1 spontaneous miscarriage, G4 2018 primary  section, G5 2020, repeat  section, G6 spontaneous miscarriage, G7 current.    Social history: Denies tobacco use, alcohol use or drug use.  Patient's partner's name is Corey.    Family history: Noncontributory for this encounter.    Medications: Prenatal vitamins.    Allergies: Percocet.    Physical exam:  Vital signs:  Vitals:    08/15/24 1947   BP: 122/84   Pulse: (!) 102   Resp: 17   Temp: 36.7 °C (98 °F)   SpO2: 96%   General: Alert, conversational, pleasant, no acute stress  Cardiovascular: Regular rate  Pulmonary: Respiratory distress, symmetric  expansion  Abdomen: Soft, nontender, nondistended, gravid  Genitourinary: Sterile speculum exam performed per RN and no vaginal bleeding within the vaginal vault and cervix appears closed  Extremities: Moves all, no edema    NST: Baseline 150s, moderate variability, positive colorations, no decelerations, tocometer with irritability.      Assessment/plan:  39-year-old  7 para 2-0-4-2 at 36+0 weeks (EDC 2024)  1.   intrauterine pregnancy at 36+0 weeks.  2.  Advanced maternal age.  3.  Chronic hypertension.  4.  History of  section x 2.  5.  Vaginal spotting?    Discussion: The patient was seen and evaluated at bedside.  She came to the hospital with questionable vaginal spotting.  She reports that she does have some vaginal bleeding with bowel movements and it may be from her lichen sclerosis in the area.  A sterile speculum exam was performed per RN patient does not have any vaginal bleeding within the vaginal vault and her cervix appears closed.  She is not lara on the monitor.  Reassurance was given.    Plan:  1.  Okay to discharge home.  2.   labor precautions.  3.  Fetal kick counts.  4.  Follow-up outpatient with Dr. Saint Pierre.      Arleth Sanders MD

## 2024-09-03 ENCOUNTER — ANESTHESIA EVENT (OUTPATIENT)
Dept: OBGYN | Facility: MEDICAL CENTER | Age: 39
End: 2024-09-03
Payer: COMMERCIAL

## 2024-09-03 ENCOUNTER — ANESTHESIA (OUTPATIENT)
Dept: OBGYN | Facility: MEDICAL CENTER | Age: 39
End: 2024-09-03
Payer: COMMERCIAL

## 2024-09-03 LAB
BASOPHILS # BLD AUTO: 0.1 % (ref 0–1.8)
BASOPHILS # BLD AUTO: 0.2 % (ref 0–1.8)
BASOPHILS # BLD: 0.01 K/UL (ref 0–0.12)
BASOPHILS # BLD: 0.02 K/UL (ref 0–0.12)
EOSINOPHIL # BLD AUTO: 0 K/UL (ref 0–0.51)
EOSINOPHIL # BLD AUTO: 0.09 K/UL (ref 0–0.51)
EOSINOPHIL NFR BLD: 0 % (ref 0–6.9)
EOSINOPHIL NFR BLD: 0.9 % (ref 0–6.9)
ERYTHROCYTE [DISTWIDTH] IN BLOOD BY AUTOMATED COUNT: 44.8 FL (ref 35.9–50)
ERYTHROCYTE [DISTWIDTH] IN BLOOD BY AUTOMATED COUNT: 46.1 FL (ref 35.9–50)
HCT VFR BLD AUTO: 38.8 % (ref 37–47)
HCT VFR BLD AUTO: 41.8 % (ref 37–47)
HGB BLD-MCNC: 13.4 G/DL (ref 12–16)
HGB BLD-MCNC: 14.2 G/DL (ref 12–16)
HOLDING TUBE BB 8507: NORMAL
IMM GRANULOCYTES # BLD AUTO: 0.05 K/UL (ref 0–0.11)
IMM GRANULOCYTES # BLD AUTO: 0.06 K/UL (ref 0–0.11)
IMM GRANULOCYTES NFR BLD AUTO: 0.5 % (ref 0–0.9)
IMM GRANULOCYTES NFR BLD AUTO: 0.5 % (ref 0–0.9)
LYMPHOCYTES # BLD AUTO: 0.95 K/UL (ref 1–4.8)
LYMPHOCYTES # BLD AUTO: 1.42 K/UL (ref 1–4.8)
LYMPHOCYTES NFR BLD: 14 % (ref 22–41)
LYMPHOCYTES NFR BLD: 7.4 % (ref 22–41)
MCH RBC QN AUTO: 30.5 PG (ref 27–33)
MCH RBC QN AUTO: 31.4 PG (ref 27–33)
MCHC RBC AUTO-ENTMCNC: 34 G/DL (ref 32.2–35.5)
MCHC RBC AUTO-ENTMCNC: 34.5 G/DL (ref 32.2–35.5)
MCV RBC AUTO: 89.9 FL (ref 81.4–97.8)
MCV RBC AUTO: 90.9 FL (ref 81.4–97.8)
MONOCYTES # BLD AUTO: 0.92 K/UL (ref 0–0.85)
MONOCYTES # BLD AUTO: 0.97 K/UL (ref 0–0.85)
MONOCYTES NFR BLD AUTO: 7.5 % (ref 0–13.4)
MONOCYTES NFR BLD AUTO: 9.1 % (ref 0–13.4)
NEUTROPHILS # BLD AUTO: 10.87 K/UL (ref 1.82–7.42)
NEUTROPHILS # BLD AUTO: 7.63 K/UL (ref 1.82–7.42)
NEUTROPHILS NFR BLD: 75.3 % (ref 44–72)
NEUTROPHILS NFR BLD: 84.5 % (ref 44–72)
NRBC # BLD AUTO: 0 K/UL
NRBC # BLD AUTO: 0 K/UL
NRBC BLD-RTO: 0 /100 WBC (ref 0–0.2)
NRBC BLD-RTO: 0 /100 WBC (ref 0–0.2)
PLATELET # BLD AUTO: 194 K/UL (ref 164–446)
PLATELET # BLD AUTO: 204 K/UL (ref 164–446)
PMV BLD AUTO: 9.4 FL (ref 9–12.9)
PMV BLD AUTO: 9.5 FL (ref 9–12.9)
RBC # BLD AUTO: 4.27 M/UL (ref 4.2–5.4)
RBC # BLD AUTO: 4.65 M/UL (ref 4.2–5.4)
T PALLIDUM AB SER QL IA: NORMAL
WBC # BLD AUTO: 10.1 K/UL (ref 4.8–10.8)
WBC # BLD AUTO: 12.9 K/UL (ref 4.8–10.8)

## 2024-09-03 PROCEDURE — 700111 HCHG RX REV CODE 636 W/ 250 OVERRIDE (IP): Mod: JZ | Performed by: ANESTHESIOLOGY

## 2024-09-03 PROCEDURE — 770002 HCHG ROOM/CARE - OB PRIVATE (112)

## 2024-09-03 PROCEDURE — 160035 HCHG PACU - 1ST 60 MINS PHASE I: Performed by: OBSTETRICS & GYNECOLOGY

## 2024-09-03 PROCEDURE — C1755 CATHETER, INTRASPINAL: HCPCS | Performed by: OBSTETRICS & GYNECOLOGY

## 2024-09-03 PROCEDURE — A9270 NON-COVERED ITEM OR SERVICE: HCPCS | Performed by: ANESTHESIOLOGY

## 2024-09-03 PROCEDURE — 160002 HCHG RECOVERY MINUTES (STAT): Performed by: OBSTETRICS & GYNECOLOGY

## 2024-09-03 PROCEDURE — 160041 HCHG SURGERY MINUTES - EA ADDL 1 MIN LEVEL 4: Performed by: OBSTETRICS & GYNECOLOGY

## 2024-09-03 PROCEDURE — 700105 HCHG RX REV CODE 258: Performed by: ANESTHESIOLOGY

## 2024-09-03 PROCEDURE — 36415 COLL VENOUS BLD VENIPUNCTURE: CPT

## 2024-09-03 PROCEDURE — 700111 HCHG RX REV CODE 636 W/ 250 OVERRIDE (IP): Performed by: OBSTETRICS & GYNECOLOGY

## 2024-09-03 PROCEDURE — 700105 HCHG RX REV CODE 258: Performed by: OBSTETRICS & GYNECOLOGY

## 2024-09-03 PROCEDURE — 700102 HCHG RX REV CODE 250 W/ 637 OVERRIDE(OP): Performed by: OBSTETRICS & GYNECOLOGY

## 2024-09-03 PROCEDURE — A9270 NON-COVERED ITEM OR SERVICE: HCPCS | Performed by: OBSTETRICS & GYNECOLOGY

## 2024-09-03 PROCEDURE — 160048 HCHG OR STATISTICAL LEVEL 1-5: Performed by: OBSTETRICS & GYNECOLOGY

## 2024-09-03 PROCEDURE — 160009 HCHG ANES TIME/MIN: Performed by: OBSTETRICS & GYNECOLOGY

## 2024-09-03 PROCEDURE — 85025 COMPLETE CBC W/AUTO DIFF WBC: CPT

## 2024-09-03 PROCEDURE — 86780 TREPONEMA PALLIDUM: CPT

## 2024-09-03 PROCEDURE — 700102 HCHG RX REV CODE 250 W/ 637 OVERRIDE(OP): Performed by: ANESTHESIOLOGY

## 2024-09-03 PROCEDURE — 160029 HCHG SURGERY MINUTES - 1ST 30 MINS LEVEL 4: Performed by: OBSTETRICS & GYNECOLOGY

## 2024-09-03 RX ORDER — SIMETHICONE 125 MG
125 TABLET,CHEWABLE ORAL 4 TIMES DAILY PRN
Status: DISCONTINUED | OUTPATIENT
Start: 2024-09-03 | End: 2024-09-05 | Stop reason: HOSPADM

## 2024-09-03 RX ORDER — SODIUM CHLORIDE, SODIUM LACTATE, POTASSIUM CHLORIDE, CALCIUM CHLORIDE 600; 310; 30; 20 MG/100ML; MG/100ML; MG/100ML; MG/100ML
INJECTION, SOLUTION INTRAVENOUS PRN
Status: DISCONTINUED | OUTPATIENT
Start: 2024-09-03 | End: 2024-09-05 | Stop reason: HOSPADM

## 2024-09-03 RX ORDER — OXYCODONE HYDROCHLORIDE 5 MG/1
5 TABLET ORAL EVERY 4 HOURS PRN
Status: DISPENSED | OUTPATIENT
Start: 2024-09-03 | End: 2024-09-04

## 2024-09-03 RX ORDER — ACETAMINOPHEN 500 MG
1000 TABLET ORAL EVERY 8 HOURS
Status: COMPLETED | OUTPATIENT
Start: 2024-09-03 | End: 2024-09-04

## 2024-09-03 RX ORDER — SODIUM CHLORIDE, SODIUM LACTATE, POTASSIUM CHLORIDE, CALCIUM CHLORIDE 600; 310; 30; 20 MG/100ML; MG/100ML; MG/100ML; MG/100ML
INJECTION, SOLUTION INTRAVENOUS CONTINUOUS
Status: DISCONTINUED | OUTPATIENT
Start: 2024-09-03 | End: 2024-09-03

## 2024-09-03 RX ORDER — HYDROCODONE BITARTRATE AND ACETAMINOPHEN 7.5; 325 MG/15ML; MG/15ML
15 SOLUTION ORAL
Status: DISCONTINUED | OUTPATIENT
Start: 2024-09-03 | End: 2024-09-03 | Stop reason: HOSPADM

## 2024-09-03 RX ORDER — CITRIC ACID/SODIUM CITRATE 334-500MG
30 SOLUTION, ORAL ORAL ONCE
Status: COMPLETED | OUTPATIENT
Start: 2024-09-03 | End: 2024-09-03

## 2024-09-03 RX ORDER — DOCUSATE SODIUM 100 MG/1
100 CAPSULE, LIQUID FILLED ORAL 2 TIMES DAILY
Status: DISCONTINUED | OUTPATIENT
Start: 2024-09-03 | End: 2024-09-05 | Stop reason: HOSPADM

## 2024-09-03 RX ORDER — MORPHINE SULFATE 0.5 MG/ML
INJECTION, SOLUTION EPIDURAL; INTRATHECAL; INTRAVENOUS PRN
Status: DISCONTINUED | OUTPATIENT
Start: 2024-09-03 | End: 2024-09-03 | Stop reason: SURG

## 2024-09-03 RX ORDER — HYDROMORPHONE HYDROCHLORIDE 1 MG/ML
0.1 INJECTION, SOLUTION INTRAMUSCULAR; INTRAVENOUS; SUBCUTANEOUS
Status: DISCONTINUED | OUTPATIENT
Start: 2024-09-03 | End: 2024-09-03 | Stop reason: HOSPADM

## 2024-09-03 RX ORDER — IBUPROFEN 800 MG/1
800 TABLET, FILM COATED ORAL EVERY 8 HOURS
Status: DISCONTINUED | OUTPATIENT
Start: 2024-09-04 | End: 2024-09-05 | Stop reason: HOSPADM

## 2024-09-03 RX ORDER — HYDROCODONE BITARTRATE AND ACETAMINOPHEN 7.5; 325 MG/15ML; MG/15ML
30 SOLUTION ORAL
Status: DISCONTINUED | OUTPATIENT
Start: 2024-09-03 | End: 2024-09-03 | Stop reason: HOSPADM

## 2024-09-03 RX ORDER — IBUPROFEN 800 MG/1
800 TABLET, FILM COATED ORAL EVERY 8 HOURS PRN
Status: DISCONTINUED | OUTPATIENT
Start: 2024-09-07 | End: 2024-09-05 | Stop reason: HOSPADM

## 2024-09-03 RX ORDER — HALOPERIDOL 5 MG/ML
1 INJECTION INTRAMUSCULAR
Status: DISCONTINUED | OUTPATIENT
Start: 2024-09-03 | End: 2024-09-03 | Stop reason: HOSPADM

## 2024-09-03 RX ORDER — DIPHENHYDRAMINE HYDROCHLORIDE 50 MG/ML
12.5 INJECTION INTRAMUSCULAR; INTRAVENOUS EVERY 6 HOURS PRN
Status: ACTIVE | OUTPATIENT
Start: 2024-09-03 | End: 2024-09-04

## 2024-09-03 RX ORDER — KETOROLAC TROMETHAMINE 15 MG/ML
15 INJECTION, SOLUTION INTRAMUSCULAR; INTRAVENOUS EVERY 6 HOURS
Status: COMPLETED | OUTPATIENT
Start: 2024-09-03 | End: 2024-09-04

## 2024-09-03 RX ORDER — LABETALOL HYDROCHLORIDE 5 MG/ML
5 INJECTION, SOLUTION INTRAVENOUS
Status: DISCONTINUED | OUTPATIENT
Start: 2024-09-03 | End: 2024-09-03 | Stop reason: HOSPADM

## 2024-09-03 RX ORDER — MAGNESIUM GLUCONATE 27 MG(500)
500 TABLET ORAL 3 TIMES DAILY
COMMUNITY

## 2024-09-03 RX ORDER — METOCLOPRAMIDE HYDROCHLORIDE 5 MG/ML
10 INJECTION INTRAMUSCULAR; INTRAVENOUS ONCE
Status: COMPLETED | OUTPATIENT
Start: 2024-09-03 | End: 2024-09-03

## 2024-09-03 RX ORDER — OXYTOCIN 10 [USP'U]/ML
10 INJECTION, SOLUTION INTRAMUSCULAR; INTRAVENOUS
Status: DISCONTINUED | OUTPATIENT
Start: 2024-09-03 | End: 2024-09-05 | Stop reason: HOSPADM

## 2024-09-03 RX ORDER — DIPHENHYDRAMINE HCL 25 MG
25 TABLET ORAL EVERY 6 HOURS PRN
Status: DISCONTINUED | OUTPATIENT
Start: 2024-09-04 | End: 2024-09-05 | Stop reason: HOSPADM

## 2024-09-03 RX ORDER — ONDANSETRON 2 MG/ML
4 INJECTION INTRAMUSCULAR; INTRAVENOUS
Status: DISCONTINUED | OUTPATIENT
Start: 2024-09-03 | End: 2024-09-03 | Stop reason: HOSPADM

## 2024-09-03 RX ORDER — OXYTOCIN 10 [USP'U]/ML
INJECTION, SOLUTION INTRAMUSCULAR; INTRAVENOUS PRN
Status: DISCONTINUED | OUTPATIENT
Start: 2024-09-03 | End: 2024-09-03 | Stop reason: SURG

## 2024-09-03 RX ORDER — ONDANSETRON 2 MG/ML
4 INJECTION INTRAMUSCULAR; INTRAVENOUS EVERY 6 HOURS PRN
Status: ACTIVE | OUTPATIENT
Start: 2024-09-03 | End: 2024-09-04

## 2024-09-03 RX ORDER — ACETAMINOPHEN 500 MG
1000 TABLET ORAL EVERY 6 HOURS
Status: DISCONTINUED | OUTPATIENT
Start: 2024-09-04 | End: 2024-09-04

## 2024-09-03 RX ORDER — SODIUM CHLORIDE, SODIUM GLUCONATE, SODIUM ACETATE, POTASSIUM CHLORIDE AND MAGNESIUM CHLORIDE 526; 502; 368; 37; 30 MG/100ML; MG/100ML; MG/100ML; MG/100ML; MG/100ML
1500 INJECTION, SOLUTION INTRAVENOUS CONTINUOUS
Status: DISCONTINUED | OUTPATIENT
Start: 2024-09-03 | End: 2024-09-03

## 2024-09-03 RX ORDER — DIPHENHYDRAMINE HYDROCHLORIDE 50 MG/ML
12.5 INJECTION INTRAMUSCULAR; INTRAVENOUS
Status: DISCONTINUED | OUTPATIENT
Start: 2024-09-03 | End: 2024-09-03 | Stop reason: HOSPADM

## 2024-09-03 RX ORDER — DIPHENHYDRAMINE HYDROCHLORIDE 50 MG/ML
25 INJECTION INTRAMUSCULAR; INTRAVENOUS EVERY 6 HOURS PRN
Status: ACTIVE | OUTPATIENT
Start: 2024-09-03 | End: 2024-09-04

## 2024-09-03 RX ORDER — HYDROMORPHONE HYDROCHLORIDE 1 MG/ML
0.2 INJECTION, SOLUTION INTRAMUSCULAR; INTRAVENOUS; SUBCUTANEOUS
Status: ACTIVE | OUTPATIENT
Start: 2024-09-03 | End: 2024-09-04

## 2024-09-03 RX ORDER — EPHEDRINE SULFATE 50 MG/ML
5 INJECTION, SOLUTION INTRAVENOUS
Status: DISCONTINUED | OUTPATIENT
Start: 2024-09-03 | End: 2024-09-03 | Stop reason: HOSPADM

## 2024-09-03 RX ORDER — DIPHENHYDRAMINE HYDROCHLORIDE 50 MG/ML
25 INJECTION INTRAMUSCULAR; INTRAVENOUS EVERY 6 HOURS PRN
Status: DISCONTINUED | OUTPATIENT
Start: 2024-09-04 | End: 2024-09-05 | Stop reason: HOSPADM

## 2024-09-03 RX ORDER — CEFAZOLIN SODIUM 1 G/3ML
2 INJECTION, POWDER, FOR SOLUTION INTRAMUSCULAR; INTRAVENOUS ONCE
Status: COMPLETED | OUTPATIENT
Start: 2024-09-03 | End: 2024-09-03

## 2024-09-03 RX ORDER — KETOROLAC TROMETHAMINE 15 MG/ML
INJECTION, SOLUTION INTRAMUSCULAR; INTRAVENOUS PRN
Status: DISCONTINUED | OUTPATIENT
Start: 2024-09-03 | End: 2024-09-03 | Stop reason: SURG

## 2024-09-03 RX ORDER — ACETAMINOPHEN 500 MG
1000 TABLET ORAL EVERY 6 HOURS PRN
Status: DISCONTINUED | OUTPATIENT
Start: 2024-09-07 | End: 2024-09-04

## 2024-09-03 RX ORDER — HYDROMORPHONE HYDROCHLORIDE 1 MG/ML
0.2 INJECTION, SOLUTION INTRAMUSCULAR; INTRAVENOUS; SUBCUTANEOUS
Status: DISCONTINUED | OUTPATIENT
Start: 2024-09-03 | End: 2024-09-03 | Stop reason: HOSPADM

## 2024-09-03 RX ORDER — OXYCODONE HYDROCHLORIDE 10 MG/1
10 TABLET ORAL EVERY 4 HOURS PRN
Status: ACTIVE | OUTPATIENT
Start: 2024-09-03 | End: 2024-09-04

## 2024-09-03 RX ORDER — HYDRALAZINE HYDROCHLORIDE 20 MG/ML
5 INJECTION INTRAMUSCULAR; INTRAVENOUS
Status: DISCONTINUED | OUTPATIENT
Start: 2024-09-03 | End: 2024-09-03 | Stop reason: HOSPADM

## 2024-09-03 RX ORDER — ONDANSETRON 4 MG/1
4 TABLET, ORALLY DISINTEGRATING ORAL EVERY 6 HOURS PRN
Status: DISCONTINUED | OUTPATIENT
Start: 2024-09-04 | End: 2024-09-05 | Stop reason: HOSPADM

## 2024-09-03 RX ORDER — HYDROCODONE BITARTRATE AND ACETAMINOPHEN 5; 325 MG/1; MG/1
1 TABLET ORAL EVERY 4 HOURS PRN
Status: DISCONTINUED | OUTPATIENT
Start: 2024-09-03 | End: 2024-09-04

## 2024-09-03 RX ORDER — ONDANSETRON 2 MG/ML
4 INJECTION INTRAMUSCULAR; INTRAVENOUS EVERY 6 HOURS PRN
Status: DISCONTINUED | OUTPATIENT
Start: 2024-09-04 | End: 2024-09-05 | Stop reason: HOSPADM

## 2024-09-03 RX ORDER — VITAMIN A ACETATE, BETA CAROTENE, ASCORBIC ACID, CHOLECALCIFEROL, .ALPHA.-TOCOPHEROL ACETATE, DL-, THIAMINE MONONITRATE, RIBOFLAVIN, NIACINAMIDE, PYRIDOXINE HYDROCHLORIDE, FOLIC ACID, CYANOCOBALAMIN, CALCIUM CARBONATE, FERROUS FUMARATE, ZINC OXIDE, CUPRIC OXIDE 3080; 12; 120; 400; 1; 1.84; 3; 20; 22; 920; 25; 200; 27; 10; 2 [IU]/1; UG/1; MG/1; [IU]/1; MG/1; MG/1; MG/1; MG/1; MG/1; [IU]/1; MG/1; MG/1; MG/1; MG/1; MG/1
1 TABLET, FILM COATED ORAL
Status: DISCONTINUED | OUTPATIENT
Start: 2024-09-04 | End: 2024-09-05 | Stop reason: HOSPADM

## 2024-09-03 RX ORDER — DEXAMETHASONE SODIUM PHOSPHATE 4 MG/ML
INJECTION, SOLUTION INTRA-ARTICULAR; INTRALESIONAL; INTRAMUSCULAR; INTRAVENOUS; SOFT TISSUE PRN
Status: DISCONTINUED | OUTPATIENT
Start: 2024-09-03 | End: 2024-09-03 | Stop reason: SURG

## 2024-09-03 RX ORDER — ALBUTEROL SULFATE 5 MG/ML
2.5 SOLUTION RESPIRATORY (INHALATION)
Status: DISCONTINUED | OUTPATIENT
Start: 2024-09-03 | End: 2024-09-03 | Stop reason: HOSPADM

## 2024-09-03 RX ORDER — BUPIVACAINE HYDROCHLORIDE 7.5 MG/ML
INJECTION, SOLUTION INTRASPINAL PRN
Status: DISCONTINUED | OUTPATIENT
Start: 2024-09-03 | End: 2024-09-03 | Stop reason: SURG

## 2024-09-03 RX ORDER — ONDANSETRON 2 MG/ML
INJECTION INTRAMUSCULAR; INTRAVENOUS PRN
Status: DISCONTINUED | OUTPATIENT
Start: 2024-09-03 | End: 2024-09-03 | Stop reason: SURG

## 2024-09-03 RX ORDER — CALCIUM CARBONATE 500 MG/1
1000 TABLET, CHEWABLE ORAL EVERY 6 HOURS PRN
Status: DISCONTINUED | OUTPATIENT
Start: 2024-09-03 | End: 2024-09-05 | Stop reason: HOSPADM

## 2024-09-03 RX ORDER — HYDROMORPHONE HYDROCHLORIDE 1 MG/ML
0.4 INJECTION, SOLUTION INTRAMUSCULAR; INTRAVENOUS; SUBCUTANEOUS
Status: DISCONTINUED | OUTPATIENT
Start: 2024-09-03 | End: 2024-09-03 | Stop reason: HOSPADM

## 2024-09-03 RX ORDER — EPHEDRINE SULFATE 50 MG/ML
10 INJECTION, SOLUTION INTRAVENOUS
Status: ACTIVE | OUTPATIENT
Start: 2024-09-03 | End: 2024-09-03

## 2024-09-03 RX ADMIN — SODIUM CITRATE AND CITRIC ACID MONOHYDRATE 30 ML: 334; 500 SOLUTION ORAL at 06:54

## 2024-09-03 RX ADMIN — BUPIVACAINE HYDROCHLORIDE IN DEXTROSE 1.4 ML: 7.5 INJECTION, SOLUTION SUBARACHNOID at 07:41

## 2024-09-03 RX ADMIN — DEXAMETHASONE SODIUM PHOSPHATE 4 MG: 4 INJECTION INTRA-ARTICULAR; INTRALESIONAL; INTRAMUSCULAR; INTRAVENOUS; SOFT TISSUE at 07:42

## 2024-09-03 RX ADMIN — PHENYLEPHRINE HYDROCHLORIDE 0.25 MCG/KG/MIN: 10 INJECTION INTRAVENOUS at 07:42

## 2024-09-03 RX ADMIN — MORPHINE SULFATE 100 MCG: 0.5 INJECTION, SOLUTION EPIDURAL; INTRATHECAL; INTRAVENOUS at 07:41

## 2024-09-03 RX ADMIN — CEFAZOLIN 2 G: 1 INJECTION, POWDER, FOR SOLUTION INTRAMUSCULAR; INTRAVENOUS at 07:42

## 2024-09-03 RX ADMIN — FAMOTIDINE 20 MG: 10 INJECTION, SOLUTION INTRAVENOUS at 06:53

## 2024-09-03 RX ADMIN — ACETAMINOPHEN 1000 MG: 500 TABLET ORAL at 19:19

## 2024-09-03 RX ADMIN — OXYTOCIN 125 ML/HR: 10 INJECTION, SOLUTION INTRAMUSCULAR; INTRAVENOUS at 08:55

## 2024-09-03 RX ADMIN — SODIUM CHLORIDE, SODIUM GLUCONATE, SODIUM ACETATE, POTASSIUM CHLORIDE AND MAGNESIUM CHLORIDE 1500 ML: 526; 502; 368; 37; 30 INJECTION, SOLUTION INTRAVENOUS at 07:13

## 2024-09-03 RX ADMIN — ACETAMINOPHEN 1000 MG: 500 TABLET ORAL at 10:46

## 2024-09-03 RX ADMIN — KETOROLAC TROMETHAMINE 15 MG: 15 INJECTION, SOLUTION INTRAMUSCULAR; INTRAVENOUS at 15:22

## 2024-09-03 RX ADMIN — KETOROLAC TROMETHAMINE 15 MG: 15 INJECTION, SOLUTION INTRAMUSCULAR; INTRAVENOUS at 08:31

## 2024-09-03 RX ADMIN — ONDANSETRON 4 MG: 2 INJECTION INTRAMUSCULAR; INTRAVENOUS at 07:42

## 2024-09-03 RX ADMIN — METOCLOPRAMIDE 10 MG: 5 INJECTION, SOLUTION INTRAMUSCULAR; INTRAVENOUS at 06:54

## 2024-09-03 RX ADMIN — FENTANYL CITRATE 10 MCG: 50 INJECTION, SOLUTION INTRAMUSCULAR; INTRAVENOUS at 07:41

## 2024-09-03 RX ADMIN — DOCUSATE SODIUM 100 MG: 100 CAPSULE, LIQUID FILLED ORAL at 17:44

## 2024-09-03 RX ADMIN — KETOROLAC TROMETHAMINE 15 MG: 15 INJECTION, SOLUTION INTRAMUSCULAR; INTRAVENOUS at 21:31

## 2024-09-03 RX ADMIN — OXYTOCIN 20 UNITS: 10 INJECTION, SOLUTION INTRAMUSCULAR; INTRAVENOUS at 08:00

## 2024-09-03 ASSESSMENT — PAIN DESCRIPTION - PAIN TYPE
TYPE: SURGICAL PAIN;ACUTE PAIN
TYPE: SURGICAL PAIN
TYPE: ACUTE PAIN;SURGICAL PAIN
TYPE: ACUTE PAIN;SURGICAL PAIN

## 2024-09-03 ASSESSMENT — PATIENT HEALTH QUESTIONNAIRE - PHQ9
1. LITTLE INTEREST OR PLEASURE IN DOING THINGS: NOT AT ALL
2. FEELING DOWN, DEPRESSED, IRRITABLE, OR HOPELESS: NOT AT ALL
SUM OF ALL RESPONSES TO PHQ9 QUESTIONS 1 AND 2: 0

## 2024-09-03 ASSESSMENT — PAIN SCALES - GENERAL
PAINLEVEL: 0 - NO PAIN
PAIN_LEVEL: 0

## 2024-09-03 ASSESSMENT — LIFESTYLE VARIABLES: ALCOHOL_USE: NO

## 2024-09-03 ASSESSMENT — FIBROSIS 4 INDEX: FIB4 SCORE: 0.42

## 2024-09-03 NOTE — ANESTHESIA TIME REPORT
Anesthesia Start and Stop Event Times       Date Time Event    9/3/2024 0704 Ready for Procedure     0734 Anesthesia Start     0847 Anesthesia Stop          Responsible Staff  09/03/24      Name Role Begin End    Ammon Minor M.D. Anesth 0734 0826          Overtime Reason:  no overtime (within assigned shift)    Comments:

## 2024-09-03 NOTE — ANESTHESIA POSTPROCEDURE EVALUATION
Patient: Tamiko Hilton    Procedure Summary       Date: 24 Room / Location: [facility] OR 02 / SURGERY LABOR AND DELIVERY    Anesthesia Start: 734 Anesthesia Stop: 847    Procedure: REPEAT  SECTION Diagnosis: (HISTORY OF  SECTION, ADVANCED MATERNAL AGE, CHRONIC HYPERTENSION AFFECTING PREGNANCY - 38.5 WEEKS, delivered)    Surgeons: Elisabeth Constantino M.D. Responsible Provider: Ammon Minor M.D.    Anesthesia Type: spinal ASA Status: 2            Final Anesthesia Type: spinal  Last vitals  BP   Blood Pressure: 135/80    Temp   36.6 °C (97.9 °F)    Pulse   88   Resp   16    SpO2   97 %      Anesthesia Post Evaluation    Patient location during evaluation: PACU  Patient participation: complete - patient participated  Level of consciousness: awake and alert  Pain score: 0    Airway patency: patent  Anesthetic complications: no  Cardiovascular status: hemodynamically stable  Respiratory status: acceptable  Hydration status: euvolemic    PONV: none          No notable events documented.

## 2024-09-03 NOTE — CARE PLAN
Problem: Psychosocial - L&D  Goal: Patient's level of anxiety will decrease  Outcome: Progressing  Note: Encourage patient/family/care giver participation.     Problem: Pain  Goal: Patient's pain will be alleviated or reduced to the patient’s comfort goal  Outcome: Progressing  Flowsheets (Taken 9/3/2024 7714)  OB Pain Level: 0-No Pain  Note: Document pain using the appropriate pain scale per order or unit policy. Administer pain medications per provider order and/or assist with epidural/spinal placement as needed. Pain management medications as ordered. Educate and implement non-pharmacologic comfort measures (i.e. relaxation, distraction, massage, cold/heat therapy, etc.)    The patient is Stable - Low risk of patient condition declining or worsening    Shift Goals  Clinical Goals: healthy mom, healthy baby  Patient Goals: healthy mom, healthy baby  Family Goals: support    Progress made toward(s) clinical / shift goals: Progressing

## 2024-09-03 NOTE — H&P
"Labor and Delivery History and Physical    CC: Presents for scheduled  section    HPI: 38yo  at 38w5d, EDC 24, presents to L&D for scheduled repeat  due to chronic HTN and prior  x 2. She is doing well today w/out complaints. No VB, CTX, or LOF. Baby moving well. No symptoms of PIH.    All other systems were reviewed and were negative except as stated above.    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, , hairstylist     FH: non-contributory to present condition    Meds: PNV    Allergies: Percocet    /79   Pulse 77   Temp 36.6 °C (97.9 °F) (Temporal)   Resp 16   Ht 1.6 m (5' 3\")   Wt 86.2 kg (190 lb)   SpO2 97%   BMI 33.66 kg/m²     Physical Exam  Gen: NAD  Resp: normal effort, no distress  Abd: soft, gravid, non tender, no rebound or guarding  Ext: No S/S of DVT, nontender    FHT: category 1  [place]: rare ctx  SVE: deferred    Laboratory Evaluation  ABO: AB+  GBS: neg  GTT: WNL  Rubella: non-immune  HIV: Neg  RPR: NR  HepBsAg: neg  Hep C: neg    Recent Labs     24  0605   WBC 10.1   RBC 4.65   HEMOGLOBIN 14.2   HEMATOCRIT 41.8   MCV 89.9   MCH 30.5   RDW 44.8   PLATELETCT 204   MPV 9.5   NEUTSPOLYS 75.30*   LYMPHOCYTES 14.00*   MONOCYTES 9.10   EOSINOPHILS 0.90   BASOPHILS 0.20         Assessment:   38yo  at 38w5d  CHTN - not on medications, no signs of preeclampsia  Prior  x 2  Declines sterilization  AMA  Rubella non-immune    Plan:  Admit to L&D  NPO  Fetal monitoring and toco  IV fluids  Anesthesia to see  Consent and prep for  section      Discussed with the patient the risks of  delivery. The risks include pain, infection, bleeding, scarring, damage to other organs in the area of operation, anesthesia " complications, and death. Specifically organs that can be damaged are bowel, bladder, ureters. I also discussed with the patient the risk of wound infection and wound breakdown. We discussed that these risks are greater in people that have diabetes or obesity. I also discussed the risk of emergency blood transfusion during procedure as well as emergency hysterectomy during procedure. Patient had the opportunity to ask questions regarding procedures. All questions answered to the patient's satisfaction. Pt agrees to proceed with surgery.      Elisabeth Constantino M.D.

## 2024-09-03 NOTE — ANESTHESIA PREPROCEDURE EVALUATION
Case: [id number] Date/Time: 2415    Procedure: REPEAT  SECTION    Pre-op diagnosis: HISTORY OF  SECTION, ADVANCED MATERNAL AGE, CHRONIC HYPERTENSION AFFECTING PREGNANCY - 38.5 WEEKS    Location: [facility] OR  / SURGERY LABOR AND DELIVERY    Surgeons: Elisabeth Constantino M.D.            Relevant Problems   ANESTHESIA (within normal limits)      PULMONARY (within normal limits)      NEURO (within normal limits)      CARDIAC (within normal limits)      GI (within normal limits)       (within normal limits)      ENDO (within normal limits)      OB (within normal limits)   38wk, repeat C/S    Physical Exam    Airway   Mallampati: II  TM distance: >3 FB  Neck ROM: full       Cardiovascular - normal exam  Rhythm: regular  Rate: normal  (-) murmur     Dental - normal exam           Pulmonary - normal exam  Breath sounds clear to auscultation     Abdominal    Neurological - normal exam                   Anesthesia Plan    ASA 2       Plan - spinal   Neuraxial block will be primary anesthetic                Postoperative Plan: Postoperative administration of opioids is intended.    Pertinent diagnostic labs and testing reviewed    Informed Consent:    Anesthetic plan and risks discussed with patient.

## 2024-09-03 NOTE — CARE PLAN
The patient is Stable - Low risk of patient condition declining or worsening    Shift Goals  Clinical Goals: Pain control and movement  Patient Goals: healthy mom, healthy baby  Family Goals: support    Progress made toward(s) clinical / shift goals:      Patient will verbalize any concerns for either infant or self, asking appropriate question regarding infants care needs, and understanding self care needs as well.     Patient will continue to remain physiologically stable with normal lochia in color and amount, a palpable uterine involution and stable vital signs with no symptoms.     Patient is not progressing towards the following goals:

## 2024-09-03 NOTE — ANESTHESIA PROCEDURE NOTES
Spinal Block    Date/Time: 9/3/2024 7:37 AM    Performed by: Ammon Minor M.D.  Authorized by: Ammon Minor M.D.    Start Time:  9/3/2024 7:37 AM  Reason for Block: primary anesthetic    patient identified, IV checked, site marked, risks and benefits discussed, surgical consent, monitors and equipment checked, pre-op evaluation and timeout performed    Patient Position:  Sitting  Prep: ChloraPrep, patient draped and sterile technique    Monitoring:  Blood pressure, continuous pulse oximetry and heart rate  Approach:  Midline  Location:  L4-5  Injection Technique:  Single-shot  Skin infiltration:  Lidocaine  Strength:  1%  Dose:  3ml  Needle Type:  Pencan  Needle Gauge:  25 G  CSF flowing pre/post injection:  Yes  Sensory Level:  T6

## 2024-09-03 NOTE — OP REPORT
DATE OF SERVICE:  9/3/2024     PREOPERATIVE DIAGNOSES:  38yo  at 38w5d  CHTN  Prior  x 2  Declines sterilization  AMA  Rubella non-immune     POSTOPERATIVE DIAGNOSES:  Same    PROCEDURE PERFORMED:  Repeat low transverse  section.     SURGEON:  Elisabeth Constantino MD     ASSISTANT: Carly Vaughan MD     ANESTHESIA:  Spinal.     ANESTHESIOLOGIST:  Ammon Minor MD     SPECIMEN:  none     ESTIMATED BLOOD LOSS:  500 mL     FINDINGS:  A viable female infant, weight 7 pounds 13 ounces, 3560 grams, Apgars of 8 and 9 in vertex presentation with clear amniotic fluid.  Very thin lower uterine segment.  There was a normal uterus, tubes, and ovaries bilaterally.     COMPLICATIONS:  None.     PROCEDURE:  After appropriate consents were obtained, the patient was taken to the operating room where spinal  anesthesia was applied without complications.  The patient was then prepped and draped in the usual sterile manner.  Clamp test on the skin verified adequate anesthesia.  A Pfannenstiel incision was made with a scalpel 3cm superior to the pubic symphysis and extended down to the rectus fascia.  The rectus fascia was incised with the scalpel and tented up. The underlying rectus muscle was  from the fascia first inferiorly and then superiorly using the richardson scissors.  The rectus muscle was  in the midline with a scalpel.  The peritoneum was entered sharply in the midline. The peritoneum incision was extended superiorly and inferiorly with the Richardson scissors with great care to avoid injury to underlying bowel or bladder.  Mariano retractor was placed. The vesicouterine peritoneum was tented up and entered with Metzenbaum scissors, and a bladder flap was created.  There was a very thin lower uterine segment.  An incision was made into the lower uterine segment transversely and the incision was extended bluntly.  Amniotomy was performed and there was noted to be clear amniotic fluid. The Infant's  head was grasped and delivered atraumatically followed by the remainder of the body without any complications.  The mouth and nares were suctioned. The cord was doubly clamped and cut and the infant was handed off to awaiting neonatology team.  The placenta was then allowed to spontaneously deliver. The uterus was cleared of clots and debris.  The hysterotomy incision was reapproximated with 1-0 Vicryl suture in a running locked fashion.  Hemostasis was noted.  The tubes and ovaries were examined and noted to be normal.  The pelvis was irrigated with normal saline. The pericolic gutters were examined and any blood clots were removed.  The Mariano retractor was removed. The peritoneum was reapproximated with 3-0 Vicryl suture running.  The rectus muscles were examined and hemostatic.  The inferior aspect of the rectus muscles were re-approximated with 1-0 Vicryl suture in a figure of eight.  The fascia was reapproximated with 0 Vicryl suture running.  The subcutaneous fat was irrigated and any small bleeders were bovied for hemostasis.  The subcutaneous fat was then reapproximated with 3-0 Vicryl suture running.  The skin was reapproximated with 4-0 Monocryl suture running. Steri strips and a Mepilex dressing were placed.  Sponge, needle, instrument, and lap counts were correct x2.  Patient tolerated the procedure well and went to recovery room in stable condition.          ____________________________________     Elisabeth Constantino MD

## 2024-09-03 NOTE — PROGRESS NOTES
0566- Pt arrived for scheduled repeat . FOB at bedside. IV starte, labs drawn. Discussed POC with pt, verbalizes understanding and states no questions at this time. Pt prepped for surgery, pre op meds given.     0734- Pt taken back to the OR.     0840- Pt arrived to PACU.     0906- Pt transferred to PP unit. Report given to Katy JACKSON. Care relinquished at this time.

## 2024-09-03 NOTE — PROGRESS NOTES
1000: Received report from Molly Richardson. Patient transferred to postpartum bed and alert.    Patient assessment completed, plan of care reviewed and Verbalized understanding. Oriented patient to call light, paperwork, unit, room and answered all other questions.     Patient is up and walking after delivery with no concerns.     1545: Per MD, order CBC and call with results for potential martinez removal.     1600: Report given to Roseann RICHARDSON, [facility].

## 2024-09-04 PROCEDURE — A9270 NON-COVERED ITEM OR SERVICE: HCPCS | Performed by: OBSTETRICS & GYNECOLOGY

## 2024-09-04 PROCEDURE — 770002 HCHG ROOM/CARE - OB PRIVATE (112)

## 2024-09-04 PROCEDURE — 700102 HCHG RX REV CODE 250 W/ 637 OVERRIDE(OP): Performed by: OBSTETRICS & GYNECOLOGY

## 2024-09-04 PROCEDURE — 700102 HCHG RX REV CODE 250 W/ 637 OVERRIDE(OP): Performed by: ANESTHESIOLOGY

## 2024-09-04 PROCEDURE — 700111 HCHG RX REV CODE 636 W/ 250 OVERRIDE (IP): Mod: JZ | Performed by: ANESTHESIOLOGY

## 2024-09-04 PROCEDURE — A9270 NON-COVERED ITEM OR SERVICE: HCPCS | Performed by: ANESTHESIOLOGY

## 2024-09-04 RX ORDER — OXYCODONE HYDROCHLORIDE 5 MG/1
5 TABLET ORAL EVERY 4 HOURS PRN
Status: DISCONTINUED | OUTPATIENT
Start: 2024-09-04 | End: 2024-09-05 | Stop reason: HOSPADM

## 2024-09-04 RX ORDER — ACETAMINOPHEN 500 MG
1000 TABLET ORAL EVERY 6 HOURS PRN
Status: DISCONTINUED | OUTPATIENT
Start: 2024-09-07 | End: 2024-09-05 | Stop reason: HOSPADM

## 2024-09-04 RX ORDER — ACETAMINOPHEN 500 MG
1000 TABLET ORAL EVERY 6 HOURS
Status: DISCONTINUED | OUTPATIENT
Start: 2024-09-04 | End: 2024-09-05 | Stop reason: HOSPADM

## 2024-09-04 RX ORDER — OXYCODONE HYDROCHLORIDE 10 MG/1
10 TABLET ORAL EVERY 4 HOURS PRN
Status: DISCONTINUED | OUTPATIENT
Start: 2024-09-04 | End: 2024-09-05 | Stop reason: HOSPADM

## 2024-09-04 RX ADMIN — DOCUSATE SODIUM 100 MG: 100 CAPSULE, LIQUID FILLED ORAL at 21:20

## 2024-09-04 RX ADMIN — IBUPROFEN 800 MG: 800 TABLET, FILM COATED ORAL at 21:57

## 2024-09-04 RX ADMIN — DOCUSATE SODIUM 100 MG: 100 CAPSULE, LIQUID FILLED ORAL at 08:28

## 2024-09-04 RX ADMIN — ACETAMINOPHEN 1000 MG: 500 TABLET ORAL at 16:15

## 2024-09-04 RX ADMIN — KETOROLAC TROMETHAMINE 15 MG: 15 INJECTION, SOLUTION INTRAMUSCULAR; INTRAVENOUS at 09:46

## 2024-09-04 RX ADMIN — IBUPROFEN 800 MG: 800 TABLET, FILM COATED ORAL at 14:21

## 2024-09-04 RX ADMIN — ACETAMINOPHEN 1000 MG: 500 TABLET ORAL at 21:57

## 2024-09-04 RX ADMIN — ACETAMINOPHEN 1000 MG: 500 TABLET ORAL at 09:45

## 2024-09-04 RX ADMIN — OXYCODONE HYDROCHLORIDE 5 MG: 5 TABLET ORAL at 08:28

## 2024-09-04 RX ADMIN — OXYCODONE HYDROCHLORIDE 5 MG: 5 TABLET ORAL at 21:21

## 2024-09-04 RX ADMIN — KETOROLAC TROMETHAMINE 15 MG: 15 INJECTION, SOLUTION INTRAMUSCULAR; INTRAVENOUS at 03:50

## 2024-09-04 RX ADMIN — ACETAMINOPHEN 1000 MG: 500 TABLET ORAL at 02:24

## 2024-09-04 RX ADMIN — PRENATAL WITH FERROUS FUM AND FOLIC ACID 1 TABLET: 3080; 920; 120; 400; 22; 1.84; 3; 20; 10; 1; 12; 200; 27; 25; 2 TABLET ORAL at 08:28

## 2024-09-04 ASSESSMENT — PAIN DESCRIPTION - PAIN TYPE
TYPE: SURGICAL PAIN
TYPE: ACUTE PAIN;SURGICAL PAIN
TYPE: SURGICAL PAIN
TYPE: ACUTE PAIN;SURGICAL PAIN
TYPE: ACUTE PAIN;SURGICAL PAIN

## 2024-09-04 ASSESSMENT — EDINBURGH POSTNATAL DEPRESSION SCALE (EPDS)
I HAVE BEEN ANXIOUS OR WORRIED FOR NO GOOD REASON: HARDLY EVER
THE THOUGHT OF HARMING MYSELF HAS OCCURRED TO ME: NEVER
THINGS HAVE BEEN GETTING ON TOP OF ME: NO, MOST OF THE TIME I HAVE COPED QUITE WELL
I HAVE BEEN SO UNHAPPY THAT I HAVE BEEN CRYING: ONLY OCCASIONALLY
I HAVE FELT SCARED OR PANICKY FOR NO GOOD REASON: YES, SOMETIMES
I HAVE BEEN ABLE TO LAUGH AND SEE THE FUNNY SIDE OF THINGS: AS MUCH AS I ALWAYS COULD
I HAVE BLAMED MYSELF UNNECESSARILY WHEN THINGS WENT WRONG: YES, SOME OF THE TIME
I HAVE BEEN SO UNHAPPY THAT I HAVE HAD DIFFICULTY SLEEPING: NOT AT ALL
I HAVE LOOKED FORWARD WITH ENJOYMENT TO THINGS: AS MUCH AS I EVER DID
I HAVE FELT SAD OR MISERABLE: NOT VERY OFTEN

## 2024-09-04 NOTE — PROGRESS NOTES
Progress Note    Tamiko Hilton   Post-op day 1  Chief Admitting Dx: Indication for care in labor or delivery [O75.9]  Delivery Type:  for repeat      Subjective:  Ambulating, voiding, tolerating diet, normal lochia, pain controlled. Was trying to not take oxy but knows she may need some.     Vitals:    24 2200 24 0000 24 0200 24 0552   BP: 116/57  123/74 128/73   Pulse: 67 66 77 74   Resp: 16 16 18 18   Temp:   36.4 °C (97.6 °F) 36.7 °C (98 °F)   TempSrc:   Temporal Temporal   SpO2: 94% 96% 97% 95%   Weight:       Height:           Exam:  Gen: no acute distress  Abdomen: soft nt/nd firm fundus  Inc: c/d/i with postop dressing  Ext: no calf pain vicente LE    Labs:   Recent Results (from the past 24 hour(s))   CBC WITH DIFFERENTIAL    Collection Time: 24  4:23 PM   Result Value Ref Range    WBC 12.9 (H) 4.8 - 10.8 K/uL    RBC 4.27 4.20 - 5.40 M/uL    Hemoglobin 13.4 12.0 - 16.0 g/dL    Hematocrit 38.8 37.0 - 47.0 %    MCV 90.9 81.4 - 97.8 fL    MCH 31.4 27.0 - 33.0 pg    MCHC 34.5 32.2 - 35.5 g/dL    RDW 46.1 35.9 - 50.0 fL    Platelet Count 194 164 - 446 K/uL    MPV 9.4 9.0 - 12.9 fL    Neutrophils-Polys 84.50 (H) 44.00 - 72.00 %    Lymphocytes 7.40 (L) 22.00 - 41.00 %    Monocytes 7.50 0.00 - 13.40 %    Eosinophils 0.00 0.00 - 6.90 %    Basophils 0.10 0.00 - 1.80 %    Immature Granulocytes 0.50 0.00 - 0.90 %    Nucleated RBC 0.00 0.00 - 0.20 /100 WBC    Neutrophils (Absolute) 10.87 (H) 1.82 - 7.42 K/uL    Lymphs (Absolute) 0.95 (L) 1.00 - 4.80 K/uL    Monos (Absolute) 0.97 (H) 0.00 - 0.85 K/uL    Eos (Absolute) 0.00 0.00 - 0.51 K/uL    Baso (Absolute) 0.01 0.00 - 0.12 K/uL    Immature Granulocytes (abs) 0.06 0.00 - 0.11 K/uL    NRBC (Absolute) 0.00 K/uL       Assessment:  Pod 1    Plan:  Routine postop care  Ambulate  Pain control  Anticipate dc home pod 2-3.     Carly Vaughan M.D.

## 2024-09-04 NOTE — CARE PLAN
The patient is Stable - Low risk of patient condition declining or worsening    Shift Goals  Clinical Goals: VSS, fundus firm with light lochia, pain control  Patient Goals: healthy mom, healthy baby  Family Goals: support    Progress made toward(s) clinical / shift goals:    Problem: Psychosocial - Postpartum  Goal: Patient will verbalize and demonstrate effective bonding and parenting behavior  Outcome: Progressing  Note: MOB bonding well with baby, providing all cares at this time, calls for assistance as needed.      Problem: Altered Physiologic Condition  Goal: Patient physiologically stable as evidenced by normal lochia, palpable uterine involution and vitals within normal limits  Outcome: Progressing  Note: Fundus firm with light lochia, patient voiding well.        Patient is not progressing towards the following goals:

## 2024-09-04 NOTE — DISCHARGE PLANNING
Discharge Planning Assessment Post Partum    Reason for Referral: History of depression and anxiety  Address: [address] [place] [address] [place] [address]  Phone: [phone]  Type of Living Situation: stable housing   Mom Diagnosis: Pregnancy,    Baby Diagnosis: -38.5 weeks   Primary Language: English     Name of Baby: Michael Hilton (: 9/3/24)  Father of the Baby: Paul Hilton   Involved in baby’s care? Yes  Contact Information: [phone]    Prenatal Care: Yes-Dr. Constantino   Mom's PCP: Vkitoria Castillo, SUDHEER  PCP for new baby: Dr. Laureano    Support System: FOB and family   Coping/Bonding between mother & baby: Yes  Source of Feeding: breast feeding   Supplies for Infant: prepared for infant; denies any needs    Mom's Insurance: [place]   Baby Covered on Insurance:Yes  Mother Employed/School: Yes  Other children in the home/names & ages: two girls-ages 6 and 4 years     Financial Hardship/Income: No   Mom's Mental status: alert and oriented  Services used prior to admit: None     CPS History: No  Psychiatric History: history of depression and anxiety.  MOB was taking medication-Zoloft but stopped due to not liking how it made her feel.  MOB is followed by [facility].  Domestic Violence History: No  Drug/ETOH History: No    Resources Provided: None.  MOB is well-prepared for infant.  Referrals Made: None      Clearance for Discharge: Infant is cleared to discharge home with parents once medically cleared

## 2024-09-04 NOTE — PROGRESS NOTES
1600 Received reports from MANUEL Burns, assumed care of patient. Per AMNUEL Burns and patient, patient would like to have martinez removed before 12hr timeframe, Dr. Puente wanted CBC to be done prior to removal.  1733 CBC results reported to Dr. Constantino, who ok'ed martinez removal.

## 2024-09-04 NOTE — PROGRESS NOTES
Bedside report received from MANUEL Whitfield. Pt in bed resting comfortably at this time. Pt able to get up to restroom and void independently, denies need for pain medication at this time. Pt states that she will call if additional pain medication is needed. Whiteboard updated. POC discussed. Call light within reach. All questions and concerns answered at this time, advised to call for assistance as needed.

## 2024-09-04 NOTE — CARE PLAN
The patient is Stable - Low risk of patient condition declining or worsening    Shift Goals  Clinical Goals: stable vital signs and movement  Patient Goals: pain control  Family Goals: bonding and rest    Patient will have no distention, discomfort belly pain, patient will be able to pass flatus and have regular bowel sounds and habits with little to no pain.       Patients pain will continue to be managed with tylenol and motrin scheduled.    Patient is not progressing towards the following goals:

## 2024-09-04 NOTE — LACTATION NOTE
Initial visit  MOB is repeat c/s on 9/3  Baby girl Marcell birth wt 7#13.6oz  Todays wt: 7#11.1oz;-1.96%  MOB reports she breast fed her now 5 yr old for 27 months with plentiful supply. She has history of breast aug in .  MOB reports hand expression yields very thick colostrum. She reports baby has had multiple stools, and 2 voids since delivery.  Discussed normal  feeding frequency , stool changes expected by day 5, how to know baby is getting enough, hand expression, engorgement, when to pump in fist weeks.  Baby at breast during visit., intermittent swallows observed. Reviewed good positioning to decrease nipple tenderness. Nipples rounded, pink and intact when baby releases them.

## 2024-09-04 NOTE — PROGRESS NOTES
0700: Received report from Hedy Richardson.  Patient in postpartum bed, patient comfortable and alert.  Patient assessment completed, fundus firm and palpable, lochia light rubra. Pain management and interventions discussed with pt.  plan of care reviewed,  and verbalized understanding and will call if needs anything.     Per Night RN, who checked with Pharmacy, patient is okay to receive Oxycodone as her allergy with Percocet was more of a side effect. MD okay with patient receiving Arlin with Benadryl on standby if needed,    1000: No reaction noted after Arlin was given.

## 2024-09-05 ENCOUNTER — PHARMACY VISIT (OUTPATIENT)
Dept: PHARMACY | Facility: MEDICAL CENTER | Age: 39
End: 2024-09-05
Payer: COMMERCIAL

## 2024-09-05 VITALS
WEIGHT: 190 LBS | SYSTOLIC BLOOD PRESSURE: 119 MMHG | BODY MASS INDEX: 33.66 KG/M2 | HEART RATE: 65 BPM | HEIGHT: 63 IN | TEMPERATURE: 97.5 F | OXYGEN SATURATION: 98 % | RESPIRATION RATE: 18 BRPM | DIASTOLIC BLOOD PRESSURE: 71 MMHG

## 2024-09-05 PROCEDURE — RXMED WILLOW AMBULATORY MEDICATION CHARGE: Performed by: OBSTETRICS & GYNECOLOGY

## 2024-09-05 PROCEDURE — 700102 HCHG RX REV CODE 250 W/ 637 OVERRIDE(OP): Performed by: OBSTETRICS & GYNECOLOGY

## 2024-09-05 PROCEDURE — A9270 NON-COVERED ITEM OR SERVICE: HCPCS | Performed by: OBSTETRICS & GYNECOLOGY

## 2024-09-05 RX ORDER — PSEUDOEPHEDRINE HCL 30 MG
100 TABLET ORAL 2 TIMES DAILY
Qty: 60 CAPSULE | Refills: 0 | Status: SHIPPED | OUTPATIENT
Start: 2024-09-05

## 2024-09-05 RX ORDER — SIMETHICONE 125 MG
125 TABLET,CHEWABLE ORAL 4 TIMES DAILY PRN
COMMUNITY
Start: 2024-09-05

## 2024-09-05 RX ORDER — ACETAMINOPHEN 500 MG
1000 TABLET ORAL EVERY 6 HOURS
COMMUNITY
Start: 2024-09-05

## 2024-09-05 RX ORDER — IBUPROFEN 800 MG/1
800 TABLET, FILM COATED ORAL EVERY 8 HOURS
Qty: 30 TABLET | Refills: 0 | Status: SHIPPED | OUTPATIENT
Start: 2024-09-05 | End: 2024-09-15

## 2024-09-05 RX ORDER — OXYCODONE HYDROCHLORIDE 5 MG/1
5 TABLET ORAL EVERY 4 HOURS PRN
Qty: 5 TABLET | Refills: 0 | Status: SHIPPED | OUTPATIENT
Start: 2024-09-05 | End: 2024-09-10

## 2024-09-05 RX ADMIN — ACETAMINOPHEN 1000 MG: 500 TABLET ORAL at 04:00

## 2024-09-05 RX ADMIN — IBUPROFEN 800 MG: 800 TABLET, FILM COATED ORAL at 05:54

## 2024-09-05 RX ADMIN — DOCUSATE SODIUM 100 MG: 100 CAPSULE, LIQUID FILLED ORAL at 05:54

## 2024-09-05 RX ADMIN — PRENATAL WITH FERROUS FUM AND FOLIC ACID 1 TABLET: 3080; 920; 120; 400; 22; 1.84; 3; 20; 10; 1; 12; 200; 27; 25; 2 TABLET ORAL at 10:47

## 2024-09-05 ASSESSMENT — PAIN DESCRIPTION - PAIN TYPE
TYPE: ACUTE PAIN;SURGICAL PAIN
TYPE: ACUTE PAIN
TYPE: ACUTE PAIN;SURGICAL PAIN

## 2024-09-05 NOTE — DISCHARGE SUMMARY
Obstetrics Discharge Summary    Admission Date: 9/3/2024         Discharge Date:     ADMISSION DIAGNOSIS:  1. SIUP @ 38+5  2. CHTN  3. History of CS x2  4. AMA    DISCHARGE DIAGNOSIS:  1. Same, delivered via repeat CS    DETAILS OF HOSPITAL STAY  Presenting Problem/History of Present Illness: scheduled repeat CS    Hospital Course:  The patient is a 39 y.o.  who presented to [facility] at 38w5d weeks with a chief complaint of scheduled repeat CS, prior to 39 wks due to hx of chronic HTN, for which she has not needed antihypertensives. She had prenatal care with [facility] with Dr. Constantino.  Her pregnancy was complicated by: see admission diagnosis list above.    The patient had a history of 2 prior  section and desired a repeat  section. For full details of the operation, please refer to the operative report dictation. Briefly, the patient was taken to the operating room where a repeat  section was performed. Bilateral salpingectomy was not performed. There were no complications. Estimated blood loss was 500 ml. Findings included a viable female infant, weight 7 pounds 13 ounces, 3560 grams, Apgars of 8 and 9 in vertex presentation with clear amniotic fluid.  Very thin lower uterine segment.  There was a normal uterus, tubes, and ovaries bilaterally . The patient delivered a viable female infant weighing 3560g with Apgars as below in delivery summary. The patient's recovery and post-operative courses were unremarkable. By post-operative day 2, the patient met all appropriate milestones and was stable to be discharged to home.    APGARs:   8   9      COMPLICATIONS: None.     PHYSICAL EXAM:  Vitals:   Vitals:    24 0624   BP: 119/71   Pulse: 65   Resp: 18   Temp: 36.4 °C (97.5 °F)   SpO2: 98%       General: Alert, conversational, pleasant, no acute distress  Abdomen: Soft, non-tender, non-distended, fundus firm, non-tender, below the umbilicus; incision dressing clean, dry  and intact  Genitourinary: Deferred  Extremities: Moves all, trace edema     LABS/STUDIES:    Latest Reference Range & Units 09/03/24 06:05 09/03/24 16:23   WBC 4.8 - 10.8 K/uL 10.1 12.9 (H)   Hemoglobin 12.0 - 16.0 g/dL 14.2 13.4   Hematocrit 37.0 - 47.0 % 41.8 38.8   Platelet Count 164 - 446 K/uL 204 194   (H): Data is abnormally high    DISPOSITION: Home.    DISCHARGE MEDICATIONS:  - Tylenol 1000mg PO every 6 hours x 10 day  - Ibuprofen 800 mg PO every 8 hours x 10 day  - Colace 100mg PO BID x 30 days  - Oxycodone 5mg q 4 hrs x 5 days    DISCHARGE INSTRUCTIONS:  1. Pelvic rest and no heavy lifting greater than 10 pounds for 6 weeks post-operatively.   2. No driving for at least 2 weeks or longer while requiring pain medication.   3. Incision check in 1 week at [facility] [phone]  4. Post-partum vist in 6 weeks at [facility] [phone]  5. Return to the emergency department if experiencing increased vaginal bleeding, severe pain, temperature greater than 100.4, or any other concerns.    DISCHARGE CONDITION: Stable.    Sandy Patricio M.D.

## 2024-09-05 NOTE — LACTATION NOTE
This note was copied from a baby's chart.  Follow up:    MOB reports she started 3 step plan overnight d/t history of delay milk onset.  MOB states latch is comfortable and able to hear swallowing.  Supplementing with either formula or EBM. Pump settings are comfortable and 25mm flanges appear correct.  MOB declines assistance with latch and pumping.  Baby's current weight loss at 7.44%    Dyad to be d/c'd today.  MOB is planning on continuing 3 step plan at home with formula supplementation.  MOB has personal LC to do home visit today or tomorrow.      Encouraged skin to skin and feeding on cue, limit pacifier use and wake baby if asleep longer than 3hrs to feed.      Breastfeeding Plan:      Continue 3 step plan and follow up with private LC. Continue to breastfeed on demand at least 8x in 24hrs. Wake baby and breastfeed if last feed was 3hrs prior.  Anticipate clusterfeeding and baby may feed more frequent and for longer periods of time.   Continue frequent skin to skin while MOB awake and attentive.     Create a comfortable and relaxing environment for breastfeeding, minimizing distractions and ensuring proper positioning for mom and baby.

## 2024-09-05 NOTE — PROGRESS NOTES
Bedside report received from MANUEL Burns. Pt in bed resting comfortably at this time. Pt able to get up to restroom and void independently, denies need for pain medication at this time. Pt states that she will call if additional pain medication is needed. Whiteboard updated. POC discussed. Call light within reach. All questions and concerns answered at this time, advised to call for assistance as needed.

## 2024-09-05 NOTE — DISCHARGE INSTRUCTIONS

## 2024-09-05 NOTE — CARE PLAN
The patient is Stable - Low risk of patient condition declining or worsening    Shift Goals  Clinical Goals: VSS, fundus firm with light lochia, pain control  Patient Goals: pain control  Family Goals: bonding and rest    Progress made toward(s) clinical / shift goals:    Problem: Psychosocial - Postpartum  Goal: Patient will verbalize and demonstrate effective bonding and parenting behavior  Outcome: Progressing  Note: MOB bonding well with baby, providing all cares at this time, calls for assistance as needed.      Problem: Altered Physiologic Condition  Goal: Patient physiologically stable as evidenced by normal lochia, palpable uterine involution and vitals within normal limits  Outcome: Progressing  Note: Fundus firm with light lochia, patient voiding well.        Patient is not progressing towards the following goals:

## (undated) DEVICE — SUTURE 2-0 CHROMIC GUT CT-1 27 (36PK/BX)"

## (undated) DEVICE — RETRACTOR O C SECTION LRY - (5/BX)

## (undated) DEVICE — SET EXTENSION WITH 2 PORTS (48EA/CA) ***PART #2C8610 IS A SUBSTITUTE*****

## (undated) DEVICE — GLOVE BIOGEL SZ 8 SURGICAL PF LTX - (50PR/BX 4BX/CA)

## (undated) DEVICE — WATER IRRIGATION STERILE 1000ML (12EA/CA)

## (undated) DEVICE — DETERGENT RENUZYME PLUS 10 OZ PACKET (50/BX)

## (undated) DEVICE — GLOVE BIOGEL SZ 7 SURGICAL PF LTX - (50PR/BX 4BX/CA)

## (undated) DEVICE — CATHETER IV NON-SAFETY 18 GA X 1 1/4 (50/BX 4BX/CA)

## (undated) DEVICE — PAD SANITARY 11IN MAXI IND WRAPPED  (12EA/PK 24PK/CA)

## (undated) DEVICE — TUBE CONNECTING SUCTION - CLEAR PLASTIC STERILE 72 IN (50EA/CA)

## (undated) DEVICE — TAPE CLOTH MEDIPORE 6 INCH - (12RL/CA)

## (undated) DEVICE — CANISTER SUCTION 3000ML MECHANICAL FILTER AUTO SHUTOFF MEDI-VAC NONSTERILE LF DISP  (40EA/CA)

## (undated) DEVICE — SUTURE3-0 36IN VCRLY PLS ANTI (36PK/BX)

## (undated) DEVICE — ELECTRODE DUAL RETURN W/ CORD - (50/PK)

## (undated) DEVICE — COVER LIGHT HANDLE FLEXIBLE - SOFT (2EA/PK 80PK/CA)

## (undated) DEVICE — SODIUM CHL IRRIGATION 0.9% 1000ML (12EA/CA)

## (undated) DEVICE — SWABSTICK BENZOIN SINGLE (50EA/BX)

## (undated) DEVICE — LEAD SET 6 DISP. EKG NIHON KOHDEN

## (undated) DEVICE — NEEDLE SAFETY 25 GA X 5/8 IN LL HYPO (100/BX 12BX/CA) WAS #6351

## (undated) DEVICE — SLEEVE, SEQUENTIAL CALF REG

## (undated) DEVICE — PACK ROOM TURNOVER L&D (12/CA)

## (undated) DEVICE — NEPTUNE 4 PORT MANIFOLD - (20/PK)

## (undated) DEVICE — SUTURE 3-0 VICRYL PLUS CT-1 - 36 INCH (36/BX)

## (undated) DEVICE — TRAY SPINAL ANESTHESIA NON-SAFETY (10/CA)

## (undated) DEVICE — SUCTION INSTRUMENT YANKAUER BULBOUS TIP W/O VENT (50EA/CA)

## (undated) DEVICE — PACK C-SECTION (2EA/CA)

## (undated) DEVICE — TUBING CLEARLINK DUO-VENT - C-FLO (48EA/CA)

## (undated) DEVICE — BLANKET UNDERBODY FULL ACCES - (5/CA)

## (undated) DEVICE — LACTATED RINGERS INJ 1000 ML - (14EA/CA 60CA/PF)

## (undated) DEVICE — TRAY BLADDER CARE W/ 16 FR FOLEY CATHETER STATLOCK  (10/CA)

## (undated) DEVICE — DRESSING ABDOMINAL PAD STERILE 8 X 10" (360EA/CA)"

## (undated) DEVICE — KIT  I.V. START (100EA/CA)

## (undated) DEVICE — CLOSURE SKIN STRIP 1/2 X 4 IN - (STERI STRIP) (50/BX 4BX/CA)

## (undated) DEVICE — SENSOR SPO2 NEO LNCS ADHESIVE (20/BX) SEE USER NOTES

## (undated) DEVICE — SET LEADWIRE 5 LEAD BEDSIDE DISPOSABLE ECG (1SET OF 5/EA)

## (undated) DEVICE — SUTURE 1 VICRYL PLUS CT-1 - 36 INCH (36/BX)

## (undated) DEVICE — DRESSING POST OP BORDER 4 X 10 (5EA/BX)

## (undated) DEVICE — CANISTER SUCTION 3000ML MECHANICAL FILTER AUTO SHUTOFF MEDI-VAC NONSTERILE LF DISP (40EA/CA)

## (undated) DEVICE — STAPLER SKIN DISP - (6/BX 10BX/CA) VISISTAT

## (undated) DEVICE — CHLORAPREP 26 ML APPLICATOR - ORANGE TINT(25/CA)

## (undated) DEVICE — HEAD HOLDER JUNIOR/ADULT

## (undated) DEVICE — TUBING D & E COLLECTION SET (50EA/PK)

## (undated) DEVICE — MASK, LARYNGEAL AIRWAY #4

## (undated) DEVICE — PROTECTOR ULNA NERVE - (36PR/CA)

## (undated) DEVICE — ELECTRODE 850 FOAM ADHESIVE - HYDROGEL RADIOTRNSPRNT (50/PK)

## (undated) DEVICE — SENSOR OXIMETER ADULT SPO2 RD SET (20EA/BX)

## (undated) DEVICE — SUTURE 4-0 MONOCRYL PLUS PS-1 - 27 INCH (36/BX)

## (undated) DEVICE — SLEEVE VASO CALF MED - (10PR/CA)

## (undated) DEVICE — 0 CHROMIC CT-1

## (undated) DEVICE — DRESSING NON ADHERENT 3 X 4 - STERILE (100/BX 12BX/CA)

## (undated) DEVICE — SUTURE 1 VICRYL PLUS CTX - 36 INCH (36/BX)

## (undated) DEVICE — GLOVE BIOGEL SZ 6.5 SURGICAL PF LTX (50PR/BX 4BX/CA)

## (undated) DEVICE — VACURETTE 7MM CURVED 10/PKG

## (undated) DEVICE — MASK OXYGEN VNYL ADLT MED CONC WITH 7 FOOT TUBING  - (50EA/CA)

## (undated) DEVICE — SUTURE 2-0 VICRYL PLUS CT-1 36 (36PK/BX)"

## (undated) DEVICE — GOWN WARMING STANDARD FLEX - (30/CA)

## (undated) DEVICE — PAD LAP STERILE 18 X 18 - (5/PK 40PK/CA)

## (undated) DEVICE — Device

## (undated) DEVICE — SUTURE 0 PDS-2 CTX 36 INCH - (24/BX)

## (undated) DEVICE — TRAY SRGPRP PVP IOD WT PRP - (20/CA)

## (undated) DEVICE — SUTURE GENERAL

## (undated) DEVICE — TOWEL STOP TIMEOUT SAFETY FLAG (40EA/CA)

## (undated) DEVICE — CANNULA O2 COMFORT SOFT EAR ADULT 7 FT TUBING (50/CA)

## (undated) DEVICE — MASK ANESTHESIA ADULT  - (100/CA)

## (undated) DEVICE — PENCIL ELECTSURG 10FT HLSTR - WITH BLADE (50EA/CA)

## (undated) DEVICE — CATHETER IV 20 GA X 1-1/4 ---SURG.& SDS ONLY--- (50EA/BX)

## (undated) DEVICE — SUTURE 0 36IN PDS + VIO CT-1 (36PK/BX)

## (undated) DEVICE — WATER IRRIG. STER. 1500 ML - (9/CA)

## (undated) DEVICE — CANISTER SUCTION RIGID RED 1500CC (40EA/CA)

## (undated) DEVICE — SHEATH RO 4F 10CM (10EA/BX)

## (undated) DEVICE — KIT D & C COLLECTION (10EA/PK)

## (undated) DEVICE — KIT ANESTHESIA W/CIRCUIT & 3/LT BAG W/FILTER (20EA/CA)